# Patient Record
Sex: MALE | Race: BLACK OR AFRICAN AMERICAN | Employment: OTHER | URBAN - METROPOLITAN AREA
[De-identification: names, ages, dates, MRNs, and addresses within clinical notes are randomized per-mention and may not be internally consistent; named-entity substitution may affect disease eponyms.]

---

## 2017-06-13 PROBLEM — R42 VERTIGO: Status: ACTIVE | Noted: 2017-06-13

## 2017-06-13 PROBLEM — H61.21 IMPACTED CERUMEN OF RIGHT EAR: Status: ACTIVE | Noted: 2017-06-13

## 2017-06-13 PROBLEM — H91.90 IMPAIRED HEARING: Status: ACTIVE | Noted: 2017-06-13

## 2017-06-27 PROBLEM — R42 DIZZINESS: Status: ACTIVE | Noted: 2017-06-27

## 2017-09-13 PROBLEM — Z71.6 ENCOUNTER FOR TOBACCO USE CESSATION COUNSELING: Status: ACTIVE | Noted: 2017-09-13

## 2017-12-14 PROBLEM — F17.210 CIGARETTE NICOTINE DEPENDENCE WITHOUT COMPLICATION: Status: ACTIVE | Noted: 2017-12-14

## 2018-11-02 PROBLEM — E11.21 TYPE 2 DIABETES WITH NEPHROPATHY (HCC): Status: ACTIVE | Noted: 2018-11-02

## 2019-02-05 ENCOUNTER — HOSPITAL ENCOUNTER (OUTPATIENT)
Dept: ULTRASOUND IMAGING | Age: 74
Discharge: HOME | End: 2019-02-05
Attending: INTERNAL MEDICINE
Payer: MEDICARE

## 2019-02-05 DIAGNOSIS — I73.9 PERIPHERAL VASCULAR DISEASE, UNSPECIFIED (HCC): ICD-10-CM

## 2019-02-05 DIAGNOSIS — F17.200 SMOKER: ICD-10-CM

## 2019-02-05 PROCEDURE — 93978 VASCULAR STUDY: CPT

## 2019-02-21 PROBLEM — Z72.0 TOBACCO ABUSE: Status: ACTIVE | Noted: 2019-02-21

## 2019-02-26 ENCOUNTER — HOSPITAL ENCOUNTER (OUTPATIENT)
Dept: MRI IMAGING | Age: 74
Discharge: HOME | End: 2019-02-26
Attending: INTERNAL MEDICINE
Payer: MEDICARE

## 2019-02-26 DIAGNOSIS — M79.89 ARM SWELLING: ICD-10-CM

## 2019-02-26 PROCEDURE — 74011250636 HC RX REV CODE- 250/636: Performed by: INTERNAL MEDICINE

## 2019-02-26 PROCEDURE — A9575 INJ GADOTERATE MEGLUMI 0.1ML: HCPCS | Performed by: INTERNAL MEDICINE

## 2019-02-26 PROCEDURE — 73220 MRI UPPR EXTREMITY W/O&W/DYE: CPT

## 2019-02-26 RX ORDER — SODIUM CHLORIDE 0.9 % (FLUSH) 0.9 %
10 SYRINGE (ML) INJECTION
Status: COMPLETED | OUTPATIENT
Start: 2019-02-26 | End: 2019-02-26

## 2019-02-26 RX ORDER — GADOTERATE MEGLUMINE 376.9 MG/ML
12 INJECTION INTRAVENOUS
Status: COMPLETED | OUTPATIENT
Start: 2019-02-26 | End: 2019-02-26

## 2019-02-26 RX ADMIN — GADOTERATE MEGLUMINE 12 ML: 376.9 INJECTION INTRAVENOUS at 11:41

## 2019-02-26 RX ADMIN — Medication 10 ML: at 11:41

## 2019-05-16 ENCOUNTER — HOSPITAL ENCOUNTER (OUTPATIENT)
Dept: GENERAL RADIOLOGY | Age: 74
Discharge: HOME | End: 2019-05-16
Payer: MEDICARE

## 2019-05-16 PROBLEM — I50.9 CHRONIC CONGESTIVE HEART FAILURE (HCC): Status: ACTIVE | Noted: 2019-05-16

## 2019-05-16 PROBLEM — F17.210 CIGARETTE NICOTINE DEPENDENCE WITHOUT COMPLICATION: Status: ACTIVE | Noted: 2019-05-16

## 2019-05-16 PROCEDURE — 71046 X-RAY EXAM CHEST 2 VIEWS: CPT

## 2019-06-05 ENCOUNTER — HOSPITAL ENCOUNTER (OUTPATIENT)
Dept: GENERAL RADIOLOGY | Age: 74
Discharge: HOME | End: 2019-06-05
Attending: NURSE PRACTITIONER
Payer: MEDICARE

## 2019-06-05 PROCEDURE — 72110 X-RAY EXAM L-2 SPINE 4/>VWS: CPT

## 2019-08-19 PROBLEM — D50.8 IRON DEFICIENCY ANEMIA DUE TO DIETARY CAUSES: Status: ACTIVE | Noted: 2019-08-19

## 2019-08-26 ENCOUNTER — PATIENT OUTREACH (OUTPATIENT)
Dept: CASE MANAGEMENT | Age: 74
End: 2019-08-26

## 2020-08-04 ENCOUNTER — HOSPITAL ENCOUNTER (OUTPATIENT)
Dept: GENERAL RADIOLOGY | Age: 75
Discharge: HOME | End: 2020-08-04
Attending: NURSE PRACTITIONER
Payer: MEDICARE

## 2020-08-04 DIAGNOSIS — R06.09 DYSPNEA ON EXERTION: ICD-10-CM

## 2020-08-04 PROCEDURE — 71046 X-RAY EXAM CHEST 2 VIEWS: CPT

## 2020-08-04 NOTE — PROGRESS NOTES
Please let [facility] know his x-ray showed pneumonia. I will send an antibiotic for him to start today. He needs to follow up in the office in 1 to 2 weeks.

## 2020-09-25 ENCOUNTER — HOSPITAL ENCOUNTER (EMERGENCY)
Age: 75
Discharge: HOME | End: 2020-09-25
Attending: EMERGENCY MEDICINE
Payer: MEDICARE

## 2020-09-25 ENCOUNTER — APPOINTMENT (OUTPATIENT)
Dept: GENERAL RADIOLOGY | Age: 75
End: 2020-09-25
Attending: EMERGENCY MEDICINE
Payer: MEDICARE

## 2020-09-25 VITALS
SYSTOLIC BLOOD PRESSURE: 147 MMHG | HEIGHT: 69 IN | WEIGHT: 138 LBS | HEART RATE: 75 BPM | RESPIRATION RATE: 17 BRPM | DIASTOLIC BLOOD PRESSURE: 75 MMHG | TEMPERATURE: 98 F | BODY MASS INDEX: 20.44 KG/M2 | OXYGEN SATURATION: 100 %

## 2020-09-25 DIAGNOSIS — R06.02 SOB (SHORTNESS OF BREATH): Primary | ICD-10-CM

## 2020-09-25 LAB
ALBUMIN SERPL-MCNC: 3.1 G/DL (ref 3.2–4.6)
ALBUMIN/GLOB SERPL: 1 {RATIO} (ref 1.2–3.5)
ALP SERPL-CCNC: 131 U/L (ref 50–136)
ALT SERPL-CCNC: 36 U/L (ref 12–65)
ANION GAP SERPL CALC-SCNC: 2 MMOL/L (ref 7–16)
AST SERPL-CCNC: 32 U/L (ref 15–37)
BASOPHILS # BLD: 0 K/UL (ref 0–0.2)
BASOPHILS NFR BLD: 1 % (ref 0–2)
BILIRUB SERPL-MCNC: 0.6 MG/DL (ref 0.2–1.1)
BNP SERPL-MCNC: ABNORMAL PG/ML
BUN SERPL-MCNC: 19 MG/DL (ref 8–23)
CALCIUM SERPL-MCNC: 8.3 MG/DL (ref 8.3–10.4)
CHLORIDE SERPL-SCNC: 110 MMOL/L (ref 98–107)
CO2 SERPL-SCNC: 30 MMOL/L (ref 21–32)
CREAT SERPL-MCNC: 1.13 MG/DL (ref 0.8–1.5)
DIFFERENTIAL METHOD BLD: ABNORMAL
EOSINOPHIL # BLD: 0.1 K/UL (ref 0–0.8)
EOSINOPHIL NFR BLD: 2 % (ref 0.5–7.8)
ERYTHROCYTE [DISTWIDTH] IN BLOOD BY AUTOMATED COUNT: 18.2 % (ref 11.9–14.6)
GLOBULIN SER CALC-MCNC: 3 G/DL (ref 2.3–3.5)
GLUCOSE SERPL-MCNC: 290 MG/DL (ref 65–100)
HCT VFR BLD AUTO: 31.4 % (ref 41.1–50.3)
HGB BLD-MCNC: 10.2 G/DL (ref 13.6–17.2)
IMM GRANULOCYTES # BLD AUTO: 0 K/UL (ref 0–0.5)
IMM GRANULOCYTES NFR BLD AUTO: 0 % (ref 0–5)
LYMPHOCYTES # BLD: 0.7 K/UL (ref 0.5–4.6)
LYMPHOCYTES NFR BLD: 18 % (ref 13–44)
MCH RBC QN AUTO: 29.6 PG (ref 26.1–32.9)
MCHC RBC AUTO-ENTMCNC: 32.5 G/DL (ref 31.4–35)
MCV RBC AUTO: 91 FL (ref 79.6–97.8)
MONOCYTES # BLD: 0.3 K/UL (ref 0.1–1.3)
MONOCYTES NFR BLD: 8 % (ref 4–12)
NEUTS SEG # BLD: 2.8 K/UL (ref 1.7–8.2)
NEUTS SEG NFR BLD: 71 % (ref 43–78)
NRBC # BLD: 0 K/UL (ref 0–0.2)
PLATELET # BLD AUTO: 115 K/UL (ref 150–450)
PMV BLD AUTO: 10.2 FL (ref 9.4–12.3)
POTASSIUM SERPL-SCNC: 4.1 MMOL/L (ref 3.5–5.1)
PROT SERPL-MCNC: 6.1 G/DL (ref 6.3–8.2)
RBC # BLD AUTO: 3.45 M/UL (ref 4.23–5.6)
SODIUM SERPL-SCNC: 142 MMOL/L (ref 136–145)
TROPONIN-HIGH SENSITIVITY: 4.8 PG/ML (ref 0–14)
WBC # BLD AUTO: 3.9 K/UL (ref 4.3–11.1)

## 2020-09-25 PROCEDURE — 83880 ASSAY OF NATRIURETIC PEPTIDE: CPT

## 2020-09-25 PROCEDURE — 71046 X-RAY EXAM CHEST 2 VIEWS: CPT

## 2020-09-25 PROCEDURE — 85025 COMPLETE CBC W/AUTO DIFF WBC: CPT

## 2020-09-25 PROCEDURE — 93005 ELECTROCARDIOGRAM TRACING: CPT | Performed by: EMERGENCY MEDICINE

## 2020-09-25 PROCEDURE — 80053 COMPREHEN METABOLIC PANEL: CPT

## 2020-09-25 PROCEDURE — 99284 EMERGENCY DEPT VISIT MOD MDM: CPT

## 2020-09-25 PROCEDURE — 84484 ASSAY OF TROPONIN QUANT: CPT

## 2020-09-25 NOTE — ED TRIAGE NOTES
Patient advises that he has been having some off and on shortness of breath for a few days, states he was told to come here to rule out pneumonia. Patient denies any fever. Patient talking with complete sentences. Patient advises that he was started with some fluid pills after he started with some lower extremity swelling. Patient advises swelling has almost returned to normal. Patient with mask on during triage. Patient denies any pain.

## 2020-09-25 NOTE — ED NOTES
Patient advises worse when he was laying down in bed last night. States he had to get up to feel better.

## 2020-09-25 NOTE — ED NOTES
I have reviewed discharge instructions with the patient. The patient verbalized understanding. Patient left ED via Discharge Method: ambulatory to Home with self. Opportunity for questions and clarification provided. Patient given 0 scripts. To continue your aftercare when you leave the hospital, you may receive an automated call from our care team to check in on how you are doing. This is a free service and part of our promise to provide the best care and service to meet your aftercare needs.  If you have questions, or wish to unsubscribe from this service please call [phone]. Thank you for Choosing our [facility].

## 2020-09-25 NOTE — ED PROVIDER NOTES
Patient is a 56-year-old male with history of hypertension, heart disease who is a very poor historian. He states he was referred here by his primary care provider for further evaluation. Patient reportedly has a history of chronic CHF and some chronic kidney disease. He states that yesterday afternoon while unloading his Jacqueline Dinesh and walking he had some shortness of breath it eased off when he rested. Last night he laid down on his side to sleep and states he felt short of breath. He called his primary care physician today they did a virtual visit with the nurse practitioner and she referred him here for evaluation of heart failure or pneumonia. The history is provided by the patient. Shortness of Breath   This is a new problem. The problem occurs intermittently. The current episode started yesterday. The problem has been resolved. Pertinent negatives include no fever, no rhinorrhea, no sore throat, no neck pain, no cough, no sputum production, no hemoptysis, no wheezing, no chest pain, no abdominal pain, no rash and no leg swelling. He has tried nothing for the symptoms. Associated medical issues include COPD, CAD and heart failure.         Past Medical History:   Diagnosis Date    BPH (benign prostatic hyperplasia)     CHF (NYHA class III, ACC/AHA stage C) ([facility])     CKD (chronic kidney disease)     COPD (chronic obstructive pulmonary disease) ([facility])     Coronary atherosclerosis of native coronary artery     Diabetes mellitus with circulatory complication ([facility])     DM neuropathies ([facility])     Dyslipidemia     GERD (gastroesophageal reflux disease)     Hyperlipidemia     Ischemic cardiomyopathy     Mitral regurgitation     PVD (peripheral vascular disease) ([facility])        Past Surgical History:   Procedure Laterality Date    HX CAROTID ENDARTERECTOMY      HX CORONARY ARTERY BYPASS GRAFT  2005         Family History:   Problem Relation Age of Onset    Diabetes Mother     Heart Attack Father    January.Minus Diabetes Brother     Hypertension Brother        Social History     Socioeconomic History    Marital status: SINGLE     Spouse name: Not on file    Number of children: Not on file    Years of education: Not on file    Highest education level: Not on file   Occupational History    Not on file   Social Needs    Financial resource strain: Not on file    Food insecurity     Worry: Not on file     Inability: Not on file    Transportation needs     Medical: Not on file     Non-medical: Not on file   Tobacco Use    Smoking status: Current Every Day Smoker     Packs/day: 1.00     Years: 30.00     Pack years: 30.00    Smokeless tobacco: Never Used    Tobacco comment: almost a pack a day now, been smoking since 24years old   Substance and Sexual Activity    Alcohol use: Yes     Alcohol/week: 3.0 standard drinks     Types: 3 Cans of beer per week     Comment: a week    Drug use: No    Sexual activity: Not on file   Lifestyle    Physical activity     Days per week: Not on file     Minutes per session: Not on file    Stress: Not on file   Relationships    Social connections     Talks on phone: Not on file     Gets together: Not on file     Attends [employer] service: Not on file     Active member of club or organization: Not on file     Attends meetings of clubs or organizations: Not on file     Relationship status: Not on file    Intimate partner violence     Fear of current or ex partner: Not on file     Emotionally abused: Not on file     Physically abused: Not on file     Forced sexual activity: Not on file   Other Topics Concern    Not on file   Social History Narrative    Not on file         ALLERGIES: Omnipaque  [iohexol]; Iodine; and Unable to obtain    Review of Systems   Constitutional: Negative for chills, fatigue and fever. HENT: Negative for congestion, rhinorrhea and sore throat. Eyes: Negative for pain, discharge and visual disturbance. Respiratory: Positive for shortness of breath. Negative for cough, hemoptysis, sputum production and wheezing. Cardiovascular: Negative for chest pain, palpitations and leg swelling. Gastrointestinal: Negative for abdominal pain, diarrhea and nausea. Endocrine: Negative for polydipsia and polyuria. Genitourinary: Negative for dysuria, frequency and urgency. Musculoskeletal: Negative for back pain and neck pain. Skin: Negative for rash. Neurological: Negative for seizures, syncope and weakness. Hematological: Negative. Vitals:    09/25/20 1350 09/25/20 1440   BP: 119/70    Pulse: 80    Resp: 16    Temp: 98 °F (36.7 °C)    SpO2: 98% 100%   Weight: 62.6 kg (138 lb)    Height: 5' 9\" (1.753 m)             Physical Exam  Vitals signs and nursing note reviewed. Constitutional:       Appearance: Normal appearance. He is well-developed. HENT:      Head: Normocephalic and atraumatic. Nose: Nose normal.   Eyes:      Extraocular Movements: Extraocular movements intact. Conjunctiva/sclera: Conjunctivae normal.      Pupils: Pupils are equal, round, and reactive to light. Neck:      Musculoskeletal: Normal range of motion and neck supple. Cardiovascular:      Rate and Rhythm: Normal rate and regular rhythm. Heart sounds: Normal heart sounds. Pulmonary:      Effort: Pulmonary effort is normal.      Breath sounds: Normal breath sounds. No decreased breath sounds, wheezing or rales. Chest:      Chest wall: No tenderness or crepitus. Abdominal:      Palpations: Abdomen is soft. Tenderness: There is no abdominal tenderness. There is no guarding or rebound. Musculoskeletal: Normal range of motion. General: No tenderness. Right lower leg: No edema. Left lower leg: No edema. Lymphadenopathy:      Cervical: No cervical adenopathy. Skin:     General: Skin is warm and dry. Capillary Refill: Capillary refill takes less than 2 seconds. Findings: No rash.    Neurological:      General: No focal deficit present. Mental Status: He is alert and oriented to person, place, and time. GCS: GCS eye subscore is 4. GCS verbal subscore is 5. GCS motor subscore is 6. Cranial Nerves: No cranial nerve deficit. Sensory: No sensory deficit. Motor: Motor function is intact. MDM  Number of Diagnoses or Management Options  Diagnosis management comments: I wore appropriate PPE throughout this patient's ED visit. Valeria Pires MD, 3:08 PM    EKG reviewed by me shows normal sinus rhythm with a rate of 76 there is some nonspecific T wave changes but no acute ischemia    CBC unremarkable  Chemistries show some chronic kidney disease but no change from his baseline  proBNP elevated at 22,000    Patient is not a very good historian but reviewing his med list he is on Lasix. I could not locate any prior cardiac records but his chest x-ray does show prior sternotomy and valve replacement. But no pulmonary edema. On exam his lungs are clear and he is satting 100% on room air he also has no peripheral edema. I note the BNP is elevated but clinically I doubt any acute congestive heart failure. We will check a troponin if that is negative I think he can be discharged to home and follow-up with his doctor. 3:38 PM  CV troponin negative. Patient remained stable 100% sat on room air. Voice dictation software was used during the making of this note. This software is not perfect and grammatical and other typographical errors may be present. This note has been proofread, but may still contain errors.   Valeria Pires MD; 9/25/2020 @3:38 PM   ===================================================================         Amount and/or Complexity of Data Reviewed  Clinical lab tests: ordered and reviewed  Tests in the radiology section of CPT®: ordered and reviewed  Tests in the medicine section of CPT®: ordered and reviewed  Review and summarize past medical records: yes  Independent visualization of images, tracings, or specimens: yes    Risk of Complications, Morbidity, and/or Mortality  Presenting problems: moderate  Diagnostic procedures: low  Management options: low    Patient Progress  Patient progress: stable         Procedures

## 2020-09-25 NOTE — DISCHARGE INSTRUCTIONS
Your work-up today was negative. Continue with all of your current medications and care. Follow-up with your doctor or return to the ER for any other acute concerns.

## 2020-09-26 LAB
ATRIAL RATE: 76 BPM
CALCULATED P AXIS, ECG09: 25 DEGREES
CALCULATED R AXIS, ECG10: -42 DEGREES
CALCULATED T AXIS, ECG11: 29 DEGREES
DIAGNOSIS, 93000: NORMAL
P-R INTERVAL, ECG05: 162 MS
Q-T INTERVAL, ECG07: 406 MS
QRS DURATION, ECG06: 104 MS
QTC CALCULATION (BEZET), ECG08: 456 MS
VENTRICULAR RATE, ECG03: 76 BPM

## 2020-10-01 ENCOUNTER — PATIENT OUTREACH (OUTPATIENT)
Dept: CASE MANAGEMENT | Age: 75
End: 2020-10-01

## 2020-10-01 NOTE — PROGRESS NOTES
DARIUS CM left VM with pt regarding referral to assess for needs. Awaiting response. Will attempt again in 24 hours.

## 2020-10-01 NOTE — PROGRESS NOTES
RNCM call to pt for ED SARAH , pocc CCM. Explained role and agreeable to outreach. Note Cardiology consult next week, 10/6, he is aware. Feeling some better today, though tired. Reports meds are mostly affordable except for his pain med for back pain. Pt states \"I have to work\",  my rent keeps going up and up, currently at $900/ month. Also agreeable to [facility] outreach. Email sent to [address] [place] [address] to f/u next week after Cardiology appmt to review meds further, discuss CHF management.

## 2020-10-02 ENCOUNTER — PATIENT OUTREACH (OUTPATIENT)
Dept: CASE MANAGEMENT | Age: 75
End: 2020-10-02

## 2020-10-02 NOTE — ACP (ADVANCE CARE PLANNING)
DARIUS CM left 2nd VM with pt regarding referral.  Awaiting response. Will attempt one last time in 5 business days.

## 2020-10-05 ENCOUNTER — PATIENT OUTREACH (OUTPATIENT)
Dept: CASE MANAGEMENT | Age: 75
End: 2020-10-05

## 2020-10-05 NOTE — PROGRESS NOTES
RNCM received call from pt. He reports he cannot go to Cardiology appmt tomorrow because he cannot afford the copay. Reviewed how important the consult is, agrees to go. Has not spoken w/ SWCM yet, reports he got a new phone because his wasn't working the end of last week. Email to [facility] to try pt again. Plan to f/u after Cardiology appmt. RNCM call to pt, message left for return call. Pt called RN back. Discussed recent Cardiology appmt, upcoming tests. Concerned about costs, expecting mailing from [facility] to apply for help. Discussed smoking cessation, encouraged to cut down, smoke less. Agreeable. Support, encouragement offered. Reviewed s/s to report to MD, call EMS, go to ED. Verbalizes understanding. Expresses appreciation for RN. Plan to f/u.

## 2020-10-06 ENCOUNTER — PATIENT OUTREACH (OUTPATIENT)
Dept: CASE MANAGEMENT | Age: 75
End: 2020-10-06

## 2020-10-07 ENCOUNTER — PATIENT OUTREACH (OUTPATIENT)
Dept: CASE MANAGEMENT | Age: 75
End: 2020-10-07

## 2020-10-07 NOTE — PROGRESS NOTES
Ambulatory Care Coordination  Social Work Assessment   Referral from which PENG CM: Abbey Mcbride   Previously referred? If so, reason and brief outcome No   Reason for current referral: Financial assistance   Income information (if needed): Low income reported   Sources of Support:    ACP set up? Needs information? No   Medication Cost assistance needed? No   Referral to CLTC/Medicaid needed? No   Referral to Medicare Extra Help/LIS program needed? No   Small home repair needed? No   MOW referral? No   Any other concerns/questions? Pt stated that he can't afford MD appts and needs assistance    [facility] Financial assistance application mailed to pt-stamped envelope provided for pt to complete form and send. DARIUS REILLY will follow up in 15 days. Goals      Patient/Family verbalizes understanding of self-management of chronic disease. Pt will attend all MD appmts as scheduled by 11/5.  Supportive resources in place to maintain patient in the community (ie. Home Health, DME equipment, refer to, medication assistant plan, etc.)      Assist pt with community resources over the next month.

## 2020-10-19 ENCOUNTER — PATIENT OUTREACH (OUTPATIENT)
Dept: CASE MANAGEMENT | Age: 75
End: 2020-10-19

## 2020-10-19 NOTE — PROGRESS NOTES
Ambulatory Care Management  Follow up Outreach Note   Outreach type: Phone call: Yes     Date/Time of Outreach: 10/19/20, 1239     Reason for follow-up:   Continued outreach   Disease specific complaints/issues: Some SOB after climbing stairs     Patient progress towards goals set from last contact:   Stable   Has patient attended any PCP or specialist follow-up appointments since last contact? What was outcome of appointment? When is next follow-up scheduled? 10/6 Cardiology, Dr Torin Machado, tests scheduled  10/21 Cardiology, Echo  10/26 Cardiology, Stress test  11/5 Cardiology, Dr Torin Machado   Review medications. Any medication changes since last outreach? Does patient have any questions or issues related to their medications? Reports med compliance, discussed lasix BID timing, use of albuterol inhaler w/ SOB. Also discussed s/s to report to MD, go to ED, call EMS. Reviewed low salt diet, elevating LEs for swelling   Home health active? If yes  any issue? Progress? NA   Referrals needed?  (SW, Diabetes education, HH, etc. ) NA   Other issues/Miscellaneous? (Transportation, access to meals, ability to perform ADLs, adequate caregiver support, etc.)   Reports will fill out hospital assistance form and put in mail today or tomorrow. Support, encouragement offered.          Next Outreach Scheduled: 1-2 weeks     Next Steps/Goals:   F/U   Ambulatory Care Manager/ LPN Care Coordinator: Jeannette Walsh RN

## 2020-10-21 ENCOUNTER — PATIENT OUTREACH (OUTPATIENT)
Dept: CASE MANAGEMENT | Age: 75
End: 2020-10-21

## 2020-10-21 NOTE — PROGRESS NOTES
RNCM received call from pt c/o SOB, states he has used albuterol inhaler but it hasn't helped. Able to speak in complete sentences, reports LE swelling. Questioning if he should go to the ED. Strongly encouraged to call EMS, not to drive himself. Pt states he thinks he can make it, again strongly encouraged to call EMS. Pt noncommittal.  Note visit to [facility] ED, currently there. Plan to f/u.

## 2020-10-26 ENCOUNTER — PATIENT OUTREACH (OUTPATIENT)
Dept: CASE MANAGEMENT | Age: 75
End: 2020-10-26

## 2020-10-26 NOTE — PROGRESS NOTES
DARIUS REILLY spoke with pt to confirm that he received financial assistance application that was mailed to him. Pt did receive it and now that he is feeling better, he plans to complete paperwork and submit it. No further needs voiced at this time. DARIUS REILLY will remain available over the next two weeks if other needs arise. Goals      Patient/Family verbalizes understanding of self-management of chronic disease. Pt will attend all MD appmts as scheduled by 11/5.  Supportive resources in place to maintain patient in the community (ie. Home Health, DME equipment, refer to, medication assistant plan, etc.)      Assist pt with community resources over the next month.

## 2020-10-26 NOTE — PROGRESS NOTES
RNCM call to pt. He was discharged from [facility] on 10/24 after 3 day stay for CHF. Reviewed meds, some changes noted. Pt has printout from discharge to refer to. Note next Cardiology f/u isn't until 11/23. Call to [facility] Cardiology and appmt made for 11/6 w/ Dr Leandro Kong. Pt appreciative. Discussed smoking cessation and requests patches, etc to help. Message to Antonella Vincent NP re ordering same. Pt concerned discharging MD from [facility] discontinued his Norco for pain. Has Pain management appmt 10/28 and plans to discuss w/ MD at that time. Plan to f/u next week.

## 2020-10-30 ENCOUNTER — PATIENT OUTREACH (OUTPATIENT)
Dept: CASE MANAGEMENT | Age: 75
End: 2020-10-30

## 2020-10-30 NOTE — PROGRESS NOTES
RNCM call to pt, message left, pt returned call. Has not picked up RX for Nicotine patches yet, will call to inquire about price. Cautioned to not smoke while wearing the patch. Understands. Reports SOB markedly better than before hospital stay. Endorses slight ankle swelling, encouraged to call MD, go to ED w/ any worsening. Encouraged daily weights, elevating LEs as able. Plan to f/u next week.

## 2020-11-09 ENCOUNTER — PATIENT OUTREACH (OUTPATIENT)
Dept: CASE MANAGEMENT | Age: 75
End: 2020-11-09

## 2020-11-09 NOTE — PROGRESS NOTES
Outreach opened to close case. No further needs at this time. DARIUS CM removed from care team.  ACM RN still engaged.

## 2020-11-18 ENCOUNTER — PATIENT OUTREACH (OUTPATIENT)
Dept: CASE MANAGEMENT | Age: 75
End: 2020-11-18

## 2020-11-18 NOTE — PROGRESS NOTES
RNCM call to pt. Pt reports \"swelling mostly gone, breathing 90% better\". Discussed missed hospital f/u appmt, has another appmt scheduled for [facility] 11/23 w/ Dr Terrence Laboy. Strongly encouraged to attend, needs med review w/ MD and prob labs drawn. Pt states he received a letter stating his visit may not be covered by Medicare, but not at home to read to this RN. Pt will call back in am to discuss further with RN.  11/19 - call from pt to report he cannot find the letter he referenced yesterday. Again, strongly encouraged to attend Cardiology f/u on Mon 11/23. Take all meds and d/c paperwork from [facility] last month. Pt confirms he will go. Plan to f/u.

## 2020-11-30 ENCOUNTER — PATIENT OUTREACH (OUTPATIENT)
Dept: CASE MANAGEMENT | Age: 75
End: 2020-11-30

## 2020-11-30 NOTE — PROGRESS NOTES
Ambulatory Care Management  Follow up Outreach Note   Outreach type: Phone call: Yes     Date/Time of Outreach: 11/30/20, 1515     Reason for follow-up:   Continued outreach   Disease specific complaints/issues: \"Good days and bad days\"     Patient progress towards goals set from last contact:   Stable   Has patient attended any PCP or specialist follow-up appointments since last contact? What was outcome of appointment? When is next follow-up scheduled? 12/ 10 Labs, PCP  12/17 PCP, Jair Anderson NP   Review medications. Any medication changes since last outreach? Does patient have any questions or issues related to their medications? Reviewed all meds w/ pt, he will call MD today for refills since he has no showed last couple of Cardiology appmts. Strongly encouraged to call and reschedule today, number provided   Home health active? If yes  any issue? Progress? NA   Referrals needed?  (SW, Diabetes education, HH, etc. ) SW updated on need for another Financial assistance lilliam   Other issues/Miscellaneous?  (Transportation, access to meals, ability to perform ADLs, adequate caregiver support, etc.)   Reviewed need for follow up         Next Outreach Scheduled: 1-2 weeks     Next Steps/Goals:   F/U   Ambulatory Care Manager/ LPN Care Coordinator: Hilton Posey RN

## 2020-11-30 NOTE — PROGRESS NOTES
[facility] Financial assistance application sent again via standard mail, per request of KARLO KHOURY. Pt stated that he misplaced the first application sent.

## 2021-07-06 PROBLEM — T18.108A ESOPHAGEAL FOREIGN BODY, INITIAL ENCOUNTER: Status: ACTIVE | Noted: 2018-01-19

## 2021-08-11 PROBLEM — N40.1 BENIGN PROSTATIC HYPERPLASIA WITH NOCTURIA: Status: ACTIVE | Noted: 2021-08-01

## 2021-08-11 PROBLEM — D64.9 ANEMIA: Status: ACTIVE | Noted: 2021-08-01

## 2021-08-11 PROBLEM — Z16.12 UTI DUE TO EXTENDED-SPECTRUM BETA LACTAMASE (ESBL) PRODUCING ESCHERICHIA COLI: Status: ACTIVE | Noted: 2021-08-04

## 2021-08-11 PROBLEM — R35.1 BENIGN PROSTATIC HYPERPLASIA WITH NOCTURIA: Status: ACTIVE | Noted: 2021-08-01

## 2021-08-11 PROBLEM — N17.9 AKI (ACUTE KIDNEY INJURY) (HCC): Status: ACTIVE | Noted: 2021-08-04

## 2021-08-11 PROBLEM — T50.B95A ADVERSE EFFECT OF COVID-19 VACCINE: Status: ACTIVE | Noted: 2021-08-01

## 2021-08-11 PROBLEM — N39.0 UTI DUE TO EXTENDED-SPECTRUM BETA LACTAMASE (ESBL) PRODUCING ESCHERICHIA COLI: Status: ACTIVE | Noted: 2021-08-04

## 2021-08-11 PROBLEM — B96.29 UTI DUE TO EXTENDED-SPECTRUM BETA LACTAMASE (ESBL) PRODUCING ESCHERICHIA COLI: Status: ACTIVE | Noted: 2021-08-04

## 2021-08-11 PROBLEM — I50.20 HFREF (HEART FAILURE WITH REDUCED EJECTION FRACTION) (HCC): Status: ACTIVE | Noted: 2021-08-04

## 2022-03-10 ENCOUNTER — HOSPITAL ENCOUNTER (OUTPATIENT)
Age: 77
Setting detail: OBSERVATION
Discharge: HOME HEALTH | End: 2022-03-12
Attending: EMERGENCY MEDICINE | Admitting: STUDENT IN AN ORGANIZED HEALTH CARE EDUCATION/TRAINING PROGRAM
Payer: MEDICARE

## 2022-03-10 ENCOUNTER — APPOINTMENT (OUTPATIENT)
Dept: CT IMAGING | Age: 77
End: 2022-03-10
Attending: STUDENT IN AN ORGANIZED HEALTH CARE EDUCATION/TRAINING PROGRAM
Payer: MEDICARE

## 2022-03-10 DIAGNOSIS — I10 HYPERTENSION, UNSPECIFIED TYPE: ICD-10-CM

## 2022-03-10 DIAGNOSIS — Z98.890 HISTORY OF MITRAL VALVE REPAIR: ICD-10-CM

## 2022-03-10 DIAGNOSIS — I50.22 CHRONIC HFREF (HEART FAILURE WITH REDUCED EJECTION FRACTION) (HCC): ICD-10-CM

## 2022-03-10 DIAGNOSIS — K92.2 UPPER GI BLEED: ICD-10-CM

## 2022-03-10 DIAGNOSIS — D64.9 ACUTE ON CHRONIC ANEMIA: ICD-10-CM

## 2022-03-10 DIAGNOSIS — I73.9 PAD (PERIPHERAL ARTERY DISEASE) (HCC): ICD-10-CM

## 2022-03-10 DIAGNOSIS — D64.9 SYMPTOMATIC ANEMIA: Primary | ICD-10-CM

## 2022-03-10 DIAGNOSIS — E78.5 HYPERLIPIDEMIA, UNSPECIFIED HYPERLIPIDEMIA TYPE: ICD-10-CM

## 2022-03-10 DIAGNOSIS — R63.4 UNINTENTIONAL WEIGHT LOSS: ICD-10-CM

## 2022-03-10 DIAGNOSIS — I25.5 ISCHEMIC CARDIOMYOPATHY: ICD-10-CM

## 2022-03-10 DIAGNOSIS — Z95.1 HX OF CABG: ICD-10-CM

## 2022-03-10 DIAGNOSIS — Z98.890 HISTORY OF CAROTID ENDARTERECTOMY: ICD-10-CM

## 2022-03-10 LAB
ALBUMIN SERPL-MCNC: 3.1 G/DL (ref 3.2–4.6)
ALBUMIN/GLOB SERPL: 0.8 {RATIO} (ref 1.2–3.5)
ALP SERPL-CCNC: 148 U/L (ref 50–136)
ALT SERPL-CCNC: 35 U/L (ref 12–65)
ANION GAP SERPL CALC-SCNC: 0 MMOL/L (ref 7–16)
AST SERPL-CCNC: 39 U/L (ref 15–37)
BASOPHILS # BLD: 0 K/UL (ref 0–0.2)
BASOPHILS # BLD: 0 K/UL (ref 0–0.2)
BASOPHILS NFR BLD: 0 % (ref 0–2)
BASOPHILS NFR BLD: 1 % (ref 0–2)
BILIRUB SERPL-MCNC: 0.4 MG/DL (ref 0.2–1.1)
BUN SERPL-MCNC: 31 MG/DL (ref 8–23)
CALCIUM SERPL-MCNC: 8.6 MG/DL (ref 8.3–10.4)
CHLORIDE SERPL-SCNC: 109 MMOL/L (ref 98–107)
CO2 SERPL-SCNC: 30 MMOL/L (ref 21–32)
CREAT SERPL-MCNC: 1.24 MG/DL (ref 0.8–1.5)
DIFFERENTIAL METHOD BLD: ABNORMAL
DIFFERENTIAL METHOD BLD: ABNORMAL
EOSINOPHIL # BLD: 0.1 K/UL (ref 0–0.8)
EOSINOPHIL # BLD: 0.2 K/UL (ref 0–0.8)
EOSINOPHIL NFR BLD: 3 % (ref 0.5–7.8)
EOSINOPHIL NFR BLD: 5 % (ref 0.5–7.8)
ERYTHROCYTE [DISTWIDTH] IN BLOOD BY AUTOMATED COUNT: 19.9 % (ref 11.9–14.6)
ERYTHROCYTE [DISTWIDTH] IN BLOOD BY AUTOMATED COUNT: 19.9 % (ref 11.9–14.6)
GLOBULIN SER CALC-MCNC: 4.1 G/DL (ref 2.3–3.5)
GLUCOSE BLD STRIP.AUTO-MCNC: 118 MG/DL (ref 65–100)
GLUCOSE BLD STRIP.AUTO-MCNC: 193 MG/DL (ref 65–100)
GLUCOSE SERPL-MCNC: 146 MG/DL (ref 65–100)
HCT VFR BLD AUTO: 25.2 % (ref 41.1–50.3)
HCT VFR BLD AUTO: 27.8 % (ref 41.1–50.3)
HEMOCCULT STL QL: POSITIVE
HGB BLD-MCNC: 7 G/DL (ref 13.6–17.2)
HGB BLD-MCNC: 7.9 G/DL (ref 13.6–17.2)
IMM GRANULOCYTES # BLD AUTO: 0 K/UL (ref 0–0.5)
IMM GRANULOCYTES # BLD AUTO: 0 K/UL (ref 0–0.5)
IMM GRANULOCYTES NFR BLD AUTO: 0 % (ref 0–5)
IMM GRANULOCYTES NFR BLD AUTO: 1 % (ref 0–5)
IRON SATN MFR SERPL: 3 %
IRON SERPL-MCNC: 10 UG/DL (ref 35–150)
LYMPHOCYTES # BLD: 0.8 K/UL (ref 0.5–4.6)
LYMPHOCYTES # BLD: 0.9 K/UL (ref 0.5–4.6)
LYMPHOCYTES NFR BLD: 20 % (ref 13–44)
LYMPHOCYTES NFR BLD: 22 % (ref 13–44)
MCH RBC QN AUTO: 22.9 PG (ref 26.1–32.9)
MCH RBC QN AUTO: 23.2 PG (ref 26.1–32.9)
MCHC RBC AUTO-ENTMCNC: 27.8 G/DL (ref 31.4–35)
MCHC RBC AUTO-ENTMCNC: 28.4 G/DL (ref 31.4–35)
MCV RBC AUTO: 81.5 FL (ref 79.6–97.8)
MCV RBC AUTO: 82.4 FL (ref 79.6–97.8)
MONOCYTES # BLD: 0.4 K/UL (ref 0.1–1.3)
MONOCYTES # BLD: 0.5 K/UL (ref 0.1–1.3)
MONOCYTES NFR BLD: 10 % (ref 4–12)
MONOCYTES NFR BLD: 12 % (ref 4–12)
NEUTS SEG # BLD: 2.6 K/UL (ref 1.7–8.2)
NEUTS SEG # BLD: 2.7 K/UL (ref 1.7–8.2)
NEUTS SEG NFR BLD: 63 % (ref 43–78)
NEUTS SEG NFR BLD: 65 % (ref 43–78)
NRBC # BLD: 0 K/UL (ref 0–0.2)
NRBC # BLD: 0 K/UL (ref 0–0.2)
PLATELET # BLD AUTO: 104 K/UL (ref 150–450)
PLATELET # BLD AUTO: 130 K/UL (ref 150–450)
PMV BLD AUTO: 10.2 FL (ref 9.4–12.3)
PMV BLD AUTO: 9.2 FL (ref 9.4–12.3)
POTASSIUM SERPL-SCNC: 3.6 MMOL/L (ref 3.5–5.1)
PROT SERPL-MCNC: 7.2 G/DL (ref 6.3–8.2)
RBC # BLD AUTO: 3.06 M/UL (ref 4.23–5.6)
RBC # BLD AUTO: 3.41 M/UL (ref 4.23–5.6)
SERVICE CMNT-IMP: ABNORMAL
SERVICE CMNT-IMP: ABNORMAL
SODIUM SERPL-SCNC: 139 MMOL/L (ref 136–145)
TIBC SERPL-MCNC: 326 UG/DL (ref 250–450)
WBC # BLD AUTO: 4 K/UL (ref 4.3–11.1)
WBC # BLD AUTO: 4.3 K/UL (ref 4.3–11.1)

## 2022-03-10 PROCEDURE — 96376 TX/PRO/DX INJ SAME DRUG ADON: CPT

## 2022-03-10 PROCEDURE — 85025 COMPLETE CBC W/AUTO DIFF WBC: CPT

## 2022-03-10 PROCEDURE — 74011250636 HC RX REV CODE- 250/636: Performed by: STUDENT IN AN ORGANIZED HEALTH CARE EDUCATION/TRAINING PROGRAM

## 2022-03-10 PROCEDURE — G0378 HOSPITAL OBSERVATION PER HR: HCPCS

## 2022-03-10 PROCEDURE — 82962 GLUCOSE BLOOD TEST: CPT

## 2022-03-10 PROCEDURE — 74011250637 HC RX REV CODE- 250/637: Performed by: STUDENT IN AN ORGANIZED HEALTH CARE EDUCATION/TRAINING PROGRAM

## 2022-03-10 PROCEDURE — 74011000250 HC RX REV CODE- 250: Performed by: EMERGENCY MEDICINE

## 2022-03-10 PROCEDURE — 99285 EMERGENCY DEPT VISIT HI MDM: CPT

## 2022-03-10 PROCEDURE — C9113 INJ PANTOPRAZOLE SODIUM, VIA: HCPCS | Performed by: EMERGENCY MEDICINE

## 2022-03-10 PROCEDURE — 86923 COMPATIBILITY TEST ELECTRIC: CPT

## 2022-03-10 PROCEDURE — C9113 INJ PANTOPRAZOLE SODIUM, VIA: HCPCS | Performed by: STUDENT IN AN ORGANIZED HEALTH CARE EDUCATION/TRAINING PROGRAM

## 2022-03-10 PROCEDURE — 74011000250 HC RX REV CODE- 250: Performed by: STUDENT IN AN ORGANIZED HEALTH CARE EDUCATION/TRAINING PROGRAM

## 2022-03-10 PROCEDURE — 94760 N-INVAS EAR/PLS OXIMETRY 1: CPT

## 2022-03-10 PROCEDURE — 86900 BLOOD TYPING SEROLOGIC ABO: CPT

## 2022-03-10 PROCEDURE — 82270 OCCULT BLOOD FECES: CPT

## 2022-03-10 PROCEDURE — 96374 THER/PROPH/DIAG INJ IV PUSH: CPT

## 2022-03-10 PROCEDURE — 74011250636 HC RX REV CODE- 250/636: Performed by: EMERGENCY MEDICINE

## 2022-03-10 PROCEDURE — 80053 COMPREHEN METABOLIC PANEL: CPT

## 2022-03-10 PROCEDURE — 83540 ASSAY OF IRON: CPT

## 2022-03-10 PROCEDURE — 36415 COLL VENOUS BLD VENIPUNCTURE: CPT

## 2022-03-10 PROCEDURE — 70490 CT SOFT TISSUE NECK W/O DYE: CPT

## 2022-03-10 RX ORDER — IBUPROFEN 200 MG
1 TABLET ORAL EVERY 24 HOURS
Status: DISCONTINUED | OUTPATIENT
Start: 2022-03-10 | End: 2022-03-12 | Stop reason: HOSPADM

## 2022-03-10 RX ORDER — INSULIN LISPRO 100 [IU]/ML
INJECTION, SOLUTION INTRAVENOUS; SUBCUTANEOUS
Status: DISCONTINUED | OUTPATIENT
Start: 2022-03-10 | End: 2022-03-12 | Stop reason: HOSPADM

## 2022-03-10 RX ORDER — ONDANSETRON 4 MG/1
4 TABLET, ORALLY DISINTEGRATING ORAL
Status: DISCONTINUED | OUTPATIENT
Start: 2022-03-10 | End: 2022-03-12 | Stop reason: HOSPADM

## 2022-03-10 RX ORDER — IPRATROPIUM BROMIDE AND ALBUTEROL SULFATE 2.5; .5 MG/3ML; MG/3ML
3 SOLUTION RESPIRATORY (INHALATION)
Status: ACTIVE | OUTPATIENT
Start: 2022-03-10 | End: 2022-03-11

## 2022-03-10 RX ORDER — CLOPIDOGREL BISULFATE 75 MG/1
75 TABLET ORAL DAILY
Status: DISCONTINUED | OUTPATIENT
Start: 2022-03-11 | End: 2022-03-11

## 2022-03-10 RX ORDER — LANOLIN ALCOHOL/MO/W.PET/CERES
1 CREAM (GRAM) TOPICAL
Status: DISCONTINUED | OUTPATIENT
Start: 2022-03-11 | End: 2022-03-12 | Stop reason: HOSPADM

## 2022-03-10 RX ORDER — ASPIRIN 325 MG
325 TABLET ORAL DAILY
Status: DISCONTINUED | OUTPATIENT
Start: 2022-03-11 | End: 2022-03-11

## 2022-03-10 RX ORDER — CHOLECALCIFEROL (VITAMIN D3) 125 MCG
10 CAPSULE ORAL
Status: DISCONTINUED | OUTPATIENT
Start: 2022-03-10 | End: 2022-03-12 | Stop reason: HOSPADM

## 2022-03-10 RX ORDER — ACETAMINOPHEN 325 MG/1
650 TABLET ORAL
Status: DISCONTINUED | OUTPATIENT
Start: 2022-03-10 | End: 2022-03-10

## 2022-03-10 RX ORDER — TORSEMIDE 20 MG/1
20 TABLET ORAL DAILY
Status: DISCONTINUED | OUTPATIENT
Start: 2022-03-11 | End: 2022-03-12 | Stop reason: HOSPADM

## 2022-03-10 RX ORDER — FLUTICASONE PROPIONATE 50 MCG
2 SPRAY, SUSPENSION (ML) NASAL DAILY
Status: DISCONTINUED | OUTPATIENT
Start: 2022-03-11 | End: 2022-03-10

## 2022-03-10 RX ORDER — BUDESONIDE AND FORMOTEROL FUMARATE DIHYDRATE 80; 4.5 UG/1; UG/1
2 AEROSOL RESPIRATORY (INHALATION) 2 TIMES DAILY
Status: DISCONTINUED | OUTPATIENT
Start: 2022-03-10 | End: 2022-03-10

## 2022-03-10 RX ORDER — POLYETHYLENE GLYCOL 3350 17 G/17G
17 POWDER, FOR SOLUTION ORAL DAILY PRN
Status: DISCONTINUED | OUTPATIENT
Start: 2022-03-10 | End: 2022-03-12 | Stop reason: HOSPADM

## 2022-03-10 RX ORDER — ACETAMINOPHEN 650 MG/1
650 SUPPOSITORY RECTAL
Status: DISCONTINUED | OUTPATIENT
Start: 2022-03-10 | End: 2022-03-10

## 2022-03-10 RX ORDER — TAMSULOSIN HYDROCHLORIDE 0.4 MG/1
0.4 CAPSULE ORAL DAILY
Status: DISCONTINUED | OUTPATIENT
Start: 2022-03-11 | End: 2022-03-12 | Stop reason: HOSPADM

## 2022-03-10 RX ORDER — DICLOFENAC SODIUM 10 MG/G
2 GEL TOPICAL 4 TIMES DAILY
Status: DISCONTINUED | OUTPATIENT
Start: 2022-03-10 | End: 2022-03-10

## 2022-03-10 RX ORDER — CARVEDILOL 6.25 MG/1
12.5 TABLET ORAL 2 TIMES DAILY
Status: DISCONTINUED | OUTPATIENT
Start: 2022-03-10 | End: 2022-03-12 | Stop reason: HOSPADM

## 2022-03-10 RX ORDER — SODIUM CHLORIDE 0.9 % (FLUSH) 0.9 %
5-40 SYRINGE (ML) INJECTION AS NEEDED
Status: DISCONTINUED | OUTPATIENT
Start: 2022-03-10 | End: 2022-03-12 | Stop reason: HOSPADM

## 2022-03-10 RX ORDER — LISINOPRIL 20 MG/1
40 TABLET ORAL DAILY
Status: DISCONTINUED | OUTPATIENT
Start: 2022-03-11 | End: 2022-03-12 | Stop reason: HOSPADM

## 2022-03-10 RX ORDER — ATORVASTATIN CALCIUM 40 MG/1
40 TABLET, FILM COATED ORAL DAILY
Status: DISCONTINUED | OUTPATIENT
Start: 2022-03-11 | End: 2022-03-12 | Stop reason: HOSPADM

## 2022-03-10 RX ORDER — PANTOPRAZOLE SODIUM 40 MG/1
40 TABLET, DELAYED RELEASE ORAL DAILY
Status: DISCONTINUED | OUTPATIENT
Start: 2022-03-11 | End: 2022-03-11

## 2022-03-10 RX ORDER — SODIUM CHLORIDE 0.9 % (FLUSH) 0.9 %
5-40 SYRINGE (ML) INJECTION EVERY 8 HOURS
Status: DISCONTINUED | OUTPATIENT
Start: 2022-03-10 | End: 2022-03-12 | Stop reason: HOSPADM

## 2022-03-10 RX ORDER — ENOXAPARIN SODIUM 100 MG/ML
40 INJECTION SUBCUTANEOUS DAILY
Status: DISCONTINUED | OUTPATIENT
Start: 2022-03-11 | End: 2022-03-10

## 2022-03-10 RX ORDER — ONDANSETRON 2 MG/ML
4 INJECTION INTRAMUSCULAR; INTRAVENOUS
Status: DISCONTINUED | OUTPATIENT
Start: 2022-03-10 | End: 2022-03-12 | Stop reason: HOSPADM

## 2022-03-10 RX ORDER — GLIPIZIDE 5 MG/1
5 TABLET ORAL DAILY
Status: DISCONTINUED | OUTPATIENT
Start: 2022-03-11 | End: 2022-03-12 | Stop reason: HOSPADM

## 2022-03-10 RX ORDER — GABAPENTIN 100 MG/1
100 CAPSULE ORAL 2 TIMES DAILY
Status: DISCONTINUED | OUTPATIENT
Start: 2022-03-10 | End: 2022-03-12 | Stop reason: HOSPADM

## 2022-03-10 RX ORDER — ALLOPURINOL 300 MG/1
300 TABLET ORAL DAILY
Status: DISCONTINUED | OUTPATIENT
Start: 2022-03-11 | End: 2022-03-12 | Stop reason: HOSPADM

## 2022-03-10 RX ORDER — BUDESONIDE AND FORMOTEROL FUMARATE DIHYDRATE 80; 4.5 UG/1; UG/1
2 AEROSOL RESPIRATORY (INHALATION)
Status: DISCONTINUED | OUTPATIENT
Start: 2022-03-10 | End: 2022-03-12 | Stop reason: HOSPADM

## 2022-03-10 RX ADMIN — SODIUM CHLORIDE, PRESERVATIVE FREE 10 ML: 5 INJECTION INTRAVENOUS at 21:46

## 2022-03-10 RX ADMIN — GABAPENTIN 100 MG: 100 CAPSULE ORAL at 17:19

## 2022-03-10 RX ADMIN — Medication 10 MG: at 21:44

## 2022-03-10 RX ADMIN — CARVEDILOL 12.5 MG: 6.25 TABLET, FILM COATED ORAL at 17:19

## 2022-03-10 RX ADMIN — SODIUM CHLORIDE, PRESERVATIVE FREE 40 MG: 5 INJECTION INTRAVENOUS at 21:46

## 2022-03-10 RX ADMIN — SODIUM CHLORIDE 40 MG: 9 INJECTION, SOLUTION INTRAMUSCULAR; INTRAVENOUS; SUBCUTANEOUS at 15:11

## 2022-03-10 RX ADMIN — SODIUM CHLORIDE 1000 ML: 900 INJECTION, SOLUTION INTRAVENOUS at 15:11

## 2022-03-10 NOTE — ED NOTES
68 yo male here today for low iron and hemoglobin. Had blood work done on 3/4 and hemoglobin 7.6. Patient was called yesterday and this morning and told to come to ER. No pain. Reports green stool, denies blood in stool. Reports some generalized fatigue, but denies dizziness. VSS. NAD. Patient evaluated initially in triage. Rapid Medical Evaluation was conducted and necessary orders have been placed. I have performed a medical screening exam.  Care will now be transferred to the provider in the back of the emergency department.   CONCEPCIÓN Khan 1:14 PM

## 2022-03-10 NOTE — PROGRESS NOTES
TRANSFER - IN REPORT:    Verbal report received from Sharla Miller, [facility] on Cami Gutierrez  being received from ED for routine progression of care      Report consisted of patients Situation, Background, Assessment and   Recommendations(SBAR). Information from the following report(s) SBAR, Kardex, ED Summary, Intake/Output, MAR, Recent Results and Med Rec Status was reviewed with the receiving nurse. Opportunity for questions and clarification was provided. Assessment completed upon patients arrival to unit and care assumed.

## 2022-03-10 NOTE — H&P (VIEW-ONLY)
[facility] Consult Note       Referring Physician:  Karina Armstrong    Consult Date:  3/10/2022    Admit Date:  3/10/2022    Chief Complaint:  anemia    Subjective:     History of Present Illness:  Patient is a 48-year-old male patient of Dr. Odalis Clarke with past medical history of CAD status post CABG on aspirin and Plavix, diabetes, diabetic neuropathy, peripheral vascular disease, carotid endarterectomy, active smoking who was seen at the request of Dr. Alexandra Valentin for anemia. He was referred to the emergency room by his primary care physician for low hemoglobin. He has had weight loss, appetite loss recently. He takes . He had 2 large bouts epistaxis 2 months ago, none since. He denies melena, BRBPR, abd pain, fevers, NSAIDs, extra ASA. He has never had colonoscopy and reports EGD remotely with possible H pylori infection. His hemoglobin seems to run in the range of 10, but since the end of , it has decreased to the high 70s now. Platelets 112. MCV normal. RDW elevated at 19.9, was normal in the past. Tren and elevated at 1.24, BUN 31. ALT normal. AST 39, alkaline phosphatase 148 with normal bilirubin. Albumin 3.1. FOBT + in ER. Due to weight loss and concern for malignancy, CT of the neck, chest, abdomen and pelvis was performed. Small right pleural effusion and patchy infiltrate in the right upper lobe seen. Cardiomegaly present. Liver normal in size and appearance with no gallstones or bile duct dilatation. Pancreas with calcification suggesting of chronic pancreatitis.          PMH:  Past Medical History:   Diagnosis Date    BPH (benign prostatic hyperplasia)     CHF (NYHA class III, ACC/AHA stage C) (HCC)     Chronic kidney disease     CKD (chronic kidney disease)     COPD (chronic obstructive pulmonary disease) (HCC)     Coronary atherosclerosis of native coronary artery     Diabetes mellitus with circulatory complication (HCC)     DM neuropathies ([address]     Dyslipidemia     GERD (gastroesophageal reflux disease)     Hyperlipidemia     Hypertension     Ischemic cardiomyopathy     Mitral regurgitation     [facility]VD [facility]per[facility] vascular [facility]) ([facility])        PSH:  Past Surgical History:   Procedure Laterality Date    HX CAROTID ENDARTERECTOMY      HX CORONARY ARTERY BYPASS GRAFT  2005       Allergies: Allergies   Allergen Reactions    Omnipaque  [Iohexol] Hives    Iodine Rash    Unable To Obtain Unknown (comments)       Home Medications:  Prior to Admission medications    Medication Sig Start Date End Date Taking? Authorizing Provider   allopurinoL (ZYLOPRIM) 300 mg tablet Take 1 Tablet by mouth daily. 3/2/22  Yes Afsaneh Domingo NP   albuterol (Ventolin HFA) 90 mcg/actuation inhaler Take 2 Puffs by inhalation every four (4) hours as needed for Wheezing. 3/2/22  Yes Talia Monsivais NP   food supplemt, lactose-reduced (Ensure High Protein) liqd Take 237 mL by mouth four (4) times daily. 3/2/22  Yes Afsnaeh Domingo NP   clopidogreL (PLAVIX) 75 mg tab Take 1 Tablet by mouth daily. 3/2/22  Yes Afsaneh Domingo NP   glipiZIDE (GLUCOTROL) 5 mg tablet Take 1 Tablet by mouth daily. 3/2/22  Yes Afsaneh Domingo NP   budesonide-formoteroL (SYMBICORT) 80-4.5 mcg/actuation HFAA Take 2 Puffs by inhalation two (2) times a day. 3/2/22  Yes Afsaneh Domingo NP   lisinopriL (PRINIVIL, ZESTRIL) 40 mg tablet Take 1 Tablet by mouth daily. 12/28/21  Yes Ana Montanez,    gabapentin (NEURONTIN) 400 mg capsule Take 400 mg by mouth. 6/27/21  Yes Provider, Historical   tamsulosin (FLOMAX) 0.4 mg capsule Take 0.4 mg by mouth daily. 8/21/21  Yes Provider, Historical   pantoprazole (PROTONIX) 40 mg tablet Take 40 mg by mouth. 5/17/21  Yes Provider, Historical   diclofenac (VOLTAREN) 1 % gel Apply  to affected area four (4) times daily. 3/31/21  Yes Afsaneh Domingo NP   carvediloL (COREG) 25 mg tablet Take 1 Tab by mouth two (2) times a day.  1/5/21  Yes Ana Montanez DO   atorvastatin (LIPITOR) 40 mg tablet Take 1 Tab by mouth daily. 1/5/21  Yes Nelia Hernandez, DO   torsemide (DEMADEX) 20 mg tablet Take 1 Tab by mouth daily. 1/5/21  Yes Nelia Spikes, DO   fluticasone propionate (FLONASE) 50 mcg/actuation nasal spray 2 Sprays by Both Nostrils route daily. 12/30/20  Yes Afsaneh Domingo NP   melatonin 10 mg tab Take 1 tab PO at HS for insomnia. 8/10/20  Yes Eyad Abreu NP   OneTouch Ultra Blue Test Strip strip USE TO TEST BLOOD GLUCOSE AS DIRECTED ONCE EVERY DAY 6/23/20  Yes Eyad Abreu NP   OTHER Dispense single pair of tedhose. Dx: dependent edema lower extremities 4/21/20  Yes Eyad Abreu NP   Dorena Mullet ULTRA2 METER misc USE AS DIRECTED DAILY FOR TESTING BLOOD SUGAR 10/10/19  Yes Eyad Abreu NP   Back Brace misc Use back brace as directed for 4-6 hours daily. DX: DDD 7/2/19  Yes Eyad Abreu NP   ONE [facility] OF [facility] 33 gauge misc  0/1/83  Yes Provider, Historical   lancets (ACCU-CHEK FASTCLIX LANCET DRUM) misc Once daily 2/4/19  Yes Jennifer Alaniz MD   aspirin (ASPIRIN) 325 mg tablet Take 325 mg by mouth daily. Yes Provider, Historical   pantoprazole (PROTONIX) 40 mg tablet Take 1 Tablet by mouth daily.   Patient not taking: Reported on 3/10/2022 3/2/22   Eyad Abreu NP   HYDROcodone-acetaminophen (NORCO) 7.5-325 mg per tablet TK 1 T PO TID PRN FOR 30 DAYS  Patient not taking: Reported on 3/10/2022 7/14/20   Provider, Historical       Hospital Medications:  Current Facility-Administered Medications   Medication Dose Route Frequency    sodium chloride (NS) flush 5-40 mL  5-40 mL IntraVENous Q8H    sodium chloride (NS) flush 5-40 mL  5-40 mL IntraVENous PRN    polyethylene glycol (MIRALAX) packet 17 g  17 g Oral DAILY PRN    ondansetron (ZOFRAN ODT) tablet 4 mg  4 mg Oral Q8H PRN    Or    ondansetron (ZOFRAN) injection 4 mg  4 mg IntraVENous Q6H PRN    albuterol-ipratropium (DUO-NEB) 2.5 MG-0.5 MG/3 ML  3 mL Nebulization NOW    [START ON 3/11/2022] atorvastatin (LIPITOR) tablet 40 mg  40 mg Oral DAILY    budesonide-formoterol (SYMBICORT) 80-4.5 mcg inhaler  2 Puff Inhalation BID    carvediloL (COREG) tablet 12.5 mg  12.5 mg Oral BID    [Held by provider] clopidogreL (PLAVIX) tablet 75 mg  75 mg Oral DAILY    [Held by provider] aspirin tablet 325 mg  325 mg Oral DAILY    [Held by provider] allopurinoL (ZYLOPRIM) tablet 300 mg  300 mg Oral DAILY    gabapentin (NEURONTIN) capsule 100 mg  100 mg Oral BID    [Held by provider] glipiZIDE (GLUCOTROL) tablet 5 mg  5 mg Oral DAILY    [Held by provider] lisinopriL (PRINIVIL, ZESTRIL) tablet 40 mg  40 mg Oral DAILY    [START ON 3/11/2022] pantoprazole (PROTONIX) tablet 40 mg  40 mg Oral DAILY    [START ON 3/11/2022] tamsulosin (FLOMAX) capsule 0.4 mg  0.4 mg Oral DAILY    [Held by provider] torsemide (DEMADEX) tablet 20 mg  20 mg Oral DAILY    nicotine (NICODERM CQ) 14 mg/24 hr patch 1 Patch  1 Patch TransDERmal Q24H    [START ON 3/11/2022] ferrous sulfate tablet 325 mg  1 Tablet Oral DAILY WITH BREAKFAST    insulin lispro (HUMALOG) injection   SubCUTAneous AC&HS    pantoprazole (PROTONIX) 40 mg in 0.9% sodium chloride 10 mL injection  40 mg IntraVENous Q12H    melatonin tablet 10 mg  10 mg Oral QHS       Social History:  Social History     Tobacco Use    Smoking status: Current Every Day Smoker     Packs/day: 0.60     Years: 30.00     Pack years: 18.00    Smokeless tobacco: Never Used    Tobacco comment: almost a pack a day now, been smoking since 24years old   Substance Use Topics    Alcohol use: Yes     Alcohol/week: 1.0 standard drink     Types: 1 Cans of beer per week     Comment: a week     Pt denies any history of IV drug use, blood transfusions, or tattoos.     Family History:  Family History   Problem Relation Age of Onset    Diabetes Mother     Heart Attack Father     Diabetes Brother     Hypertension Brother        Review of Systems:  A detailed 10 system ROS is obtained, with pertinent positives as listed above. All others are negative. Diet:      Objective:     Physical Exam:  Vitals:  Visit Vitals  BP (!) 124/59 (BP 1 Location: Left upper arm, BP Patient Position: At rest)   Pulse 83   Temp 98.3 °F (36.8 °C)   Resp 18   Ht 5' 9\" (1.753 m)   Wt 79.4 kg (175 lb)   SpO2 95%   BMI 25.84 kg/m²     Gen:  Pt is alert, cooperative, no acute distress  Skin:  Extremities and face reveal no rashes. No palmar erythema. No telangiectasias on the chest wall. HEENT: Sclerae anicteric. Extra-occular muscles are intact. No oral ulcers. No abnormal pigmentation of the lips. The neck is supple. Cardiovascular: Regular rate and rhythm. No murmurs, gallops, or rubs. Respiratory:  Comfortable breathing with no accessory muscle use. Clear breath sounds anteriorly with no wheezes, rales, or rhonchi. GI:  Abdomen nondistended, soft, and nontender. Normal active bowel sounds. No enlargement of the liver or spleen. No masses palpable. Rectal:  Deferred  Musculoskeletal:  No pitting edema of the lower legs. Extremities have good range of motion. SCDs  Neurological:  Gross memory appears intact. Patient is alert and oriented. Psychiatric:  Mood appears appropriate with judgement intact. Lymphatic:  No cervical or supraclavicular adenopathy. Laboratory:    Recent Labs     03/10/22  1322   WBC 4.0*   HGB 7.9*   HCT 27.8*   *   MCV 81.5      K 3.6   *   CO2 30   BUN 31*   CREA 1.24   CA 8.6   *   *   ALB 3.1*   TP 7.2          Assessment:       40-year-old male patient of Dr. Fina Pineda with past medical history of CAD status post CABG on aspirin and Plavix, diabetes, diabetic neuropathy, peripheral vascular disease, carotid endarterectomy, active smoking who was seen at the request of Dr. Mayank Stewart for anemia. He was referred to the emergency room by his primary care physician for low hemoglobin. He has weight loss and loss of appetite.      He has acute on chronic normocytic anemia with a new increase in RDW. There has been no overt bleeding. He is currently eating solid food. Plan:       1. Iron panel  2. EGD tomorrow for weight loss and loss of appetite  3. Colonoscopy outpatient if no overt bleeding  4. NPO at [place]  5.  Transfuse as needed    Donte Duran MD  [facility], [place]

## 2022-03-10 NOTE — ED TRIAGE NOTES
Patient reports PCP called and told him to come in for low \"iron\". Patient reports green BM x 3-4 times.  Masked

## 2022-03-10 NOTE — PROGRESS NOTES
Care Management Interventions  PCP Verified by CM: Yes Rony Hooper N.P.)  Transition of Care Consult (CM Consult): Discharge Planning  Support Systems: Friend/Neighbor Javed Romero #[phone])  Confirm Follow Up Transport: Family  The Patient and/or Patient Representative was Provided with a Choice of Provider and Agrees with the Discharge Plan?: Yes  Freedom of Choice List was Provided with Basic Dialogue that Supports the Patient's Individualized Plan of Care/Goals, Treatment Preferences and Shares the Quality Data Associated with the Providers?: Yes  Discharge Location  Patient Expects to be Discharged to[de-identified] Unable to determine at this time  Visited with pt to establish prior to admission baseline and discuss their anticipated goals at time of d/c.   Pt came in b/c he received a call form PCP about low Hgb levels from recent blood test and to please cone to the ED for evaluation. Pt lives at home at the [facility] apartments, inde with ADL's, drives and current with PCP, his Rx are filled at [facility] off [facility]. PT to eval and Cm to follow.     [address] letter done

## 2022-03-10 NOTE — ED NOTES
TRANSFER - OUT REPORT:    [facility] [address]  being transferred to [facility] for routine progression of care       Report consisted of patients Situation, Background, Assessment and   Recommendations(SBAR). Information from the following report(s) SBAR, ED Summary, MAR, Recent Results and Quality Measures was reviewed with the receiving nurse. Lines:   Peripheral IV 03/10/22 Left Forearm (Active)   Site Assessment Clean, dry, & intact 03/10/22 1315   Phlebitis Assessment 0 03/10/22 1315   Infiltration Assessment 0 03/10/22 1315   Dressing Status Clean, dry, & intact 03/10/22 1315   Dressing Type Gauze;Tape;Transparent 03/10/22 1315   Hub Color/Line Status Pink;Flushed 03/10/22 1315   Action Taken Blood drawn 03/10/22 1315        Opportunity for questions and clarification was provided.       Patient transported with:   [employer]

## 2022-03-10 NOTE — PROGRESS NOTES
03/10/22 1738   Skin Integumentary   Skin Integumentary (WDL) X    Pressure  Injury Documentation No Pressure Injury Noted-Pressure Ulcer Prevention Initiated   Skin Color Appropriate for ethnicity   Skin Condition/Temp Cool;Dry;Flaky   Skin Integrity Other (comment)  (white mole on bottom of right heel )   Nails X   Exceptions to WDL Thick   Pt admitted to room 336. Alert and oriented x4. Reports last bowel movement 3/9/2022, describes as hard and green. Abdomen soft, denies pain. 2+ pitting edema in the bilateral lower extremities. Nicotine patch applied to left arm. Ambulated to restroom, voided without difficulty. .  No needs expressed at this time

## 2022-03-10 NOTE — CONSULTS
[facility] Consult Note       Referring Physician:  Jasmyn Carter    Consult Date:  3/10/2022    Admit Date:  3/10/2022    Chief Complaint:  anemia    Subjective:     History of Present Illness:  Patient is a 69-year-old male patient of Dr. Fina Pineda with past medical history of CAD status post CABG on aspirin and Plavix, diabetes, diabetic neuropathy, peripheral vascular disease, carotid endarterectomy, active smoking who was seen at the request of Dr. Mayank Stewart for anemia. He was referred to the emergency room by his primary care physician for low hemoglobin. He has had weight loss, appetite loss recently. He takes . He had 2 large bouts epistaxis 2 months ago, none since. He denies melena, BRBPR, abd pain, fevers, NSAIDs, extra ASA. He has never had colonoscopy and reports EGD remotely with possible H pylori infection. His hemoglobin seems to run in the range of 10, but since the end of 2020, it has decreased to the high 70s now. Platelets 584. MCV normal. RDW elevated at 19.9, was normal in the past. Tren and elevated at 1.24, BUN 31. ALT normal. AST 39, alkaline phosphatase 148 with normal bilirubin. Albumin 3.1. FOBT + in ER. Due to weight loss and concern for malignancy, CT of the neck, chest, abdomen and pelvis was performed. Small right pleural effusion and patchy infiltrate in the right upper lobe seen. Cardiomegaly present. Liver normal in size and appearance with no gallstones or bile duct dilatation. Pancreas with calcification suggesting of chronic pancreatitis.          PMH:  Past Medical History:   Diagnosis Date    BPH (benign prostatic hyperplasia)     CHF (NYHA class III, ACC/AHA stage C) (HCC)     Chronic kidney disease     CKD (chronic kidney disease)     COPD (chronic obstructive pulmonary disease) (HCC)     Coronary atherosclerosis of native coronary artery     Diabetes mellitus with circulatory complication (HCC)     DM neuropathies ([address]     Dyslipidemia     GERD (gastroesophageal reflux disease)     Hyperlipidemia     Hypertension     Ischemic cardiomyopathy     Mitral regurgitation     [facility])        PSH:  Past Surgical History:   Procedure Laterality Date    HX CAROTID ENDARTERECTOMY      HX CORONARY ARTERY BYPASS GRAFT  2005       Allergies: Allergies   Allergen Reactions    Omnipaque  [Iohexol] Hives    Iodine Rash    Unable To Obtain Unknown (comments)       Home Medications:  Prior to Admission medications    Medication Sig Start Date End Date Taking? Authorizing Provider   allopurinoL (ZYLOPRIM) 300 mg tablet Take 1 Tablet by mouth daily. 3/2/22  Yes Afsaneh Domingo NP   albuterol (Ventolin HFA) 90 mcg/actuation inhaler Take 2 Puffs by inhalation every four (4) hours as needed for Wheezing. 3/2/22  Yes Aurora Akers NP   food supplemt, lactose-reduced (Ensure High Protein) liqd Take 237 mL by mouth four (4) times daily. 3/2/22  Yes Afsaneh Domingo NP   clopidogreL (PLAVIX) 75 mg tab Take 1 Tablet by mouth daily. 3/2/22  Yes Afsaneh Domingo NP   glipiZIDE (GLUCOTROL) 5 mg tablet Take 1 Tablet by mouth daily. 3/2/22  Yes Afsaneh Domingo NP   budesonide-formoteroL (SYMBICORT) 80-4.5 mcg/actuation HFAA Take 2 Puffs by inhalation two (2) times a day. 3/2/22  Yes Afsaneh Domingo NP   lisinopriL (PRINIVIL, ZESTRIL) 40 mg tablet Take 1 Tablet by mouth daily. 12/28/21  Yes Julian Erazo DO   gabapentin (NEURONTIN) 400 mg capsule Take 400 mg by mouth. 6/27/21  Yes Provider, Historical   tamsulosin (FLOMAX) 0.4 mg capsule Take 0.4 mg by mouth daily. 8/21/21  Yes Provider, Historical   pantoprazole (PROTONIX) 40 mg tablet Take 40 mg by mouth. 5/17/21  Yes Provider, Historical   diclofenac (VOLTAREN) 1 % gel Apply  to affected area four (4) times daily. 3/31/21  Yes Afsaneh Domingo NP   carvediloL (COREG) 25 mg tablet Take 1 Tab by mouth two (2) times a day.  1/5/21  Yes Julian Erazo DO   atorvastatin (LIPITOR) 40 mg tablet Take 1 Tab by mouth daily. 1/5/21  Yes Deontetorrie Hernandez DO   torsemide (DEMADEX) 20 mg tablet Take 1 Tab by mouth daily. 1/5/21  Yes Deonte Hones, DO   fluticasone propionate (FLONASE) 50 mcg/actuation nasal spray 2 Sprays by Both Nostrils route daily. 12/30/20  Yes Afsaneh Domingo NP   melatonin 10 mg tab Take 1 tab PO at HS for insomnia. 8/10/20  Yes Solomon Stevens NP   OneTouch Ultra Blue Test Strip strip USE TO TEST BLOOD GLUCOSE AS DIRECTED ONCE EVERY DAY 6/23/20  Yes Solomon Stevens NP   OTHER Dispense single pair of tedhose. Dx: dependent edema lower extremities 4/21/20  Yes Solomon Stevens NP   Amanda Homme ULTRA2 METER misc USE AS DIRECTED DAILY FOR TESTING BLOOD SUGAR 10/10/19  Yes Solomon Stevens NP   Back Brace misc Use back brace as directed for 4-6 hours daily. DX: DDD 7/2/19  Yes Solomon Stevens NP   ONE [facility] 33 gauge misc  0/0/36  Yes Provider, Historical   lancets (ACCU-CHEK FASTCLIX LANCET DRUM) misc Once daily 2/4/19  Yes Bertrand Cesar MD   aspirin (ASPIRIN) 325 mg tablet Take 325 mg by mouth daily. Yes Provider, Historical   pantoprazole (PROTONIX) 40 mg tablet Take 1 Tablet by mouth daily.   Patient not taking: Reported on 3/10/2022 3/2/22   Solomon Stevens NP   HYDROcodone-acetaminophen (NORCO) 7.5-325 mg per tablet TK 1 T PO TID PRN FOR 30 DAYS  Patient not taking: Reported on 3/10/2022 7/14/20   Provider, Historical       Hospital Medications:  Current Facility-Administered Medications   Medication Dose Route Frequency    sodium chloride (NS) flush 5-40 mL  5-40 mL IntraVENous Q8H    sodium chloride (NS) flush 5-40 mL  5-40 mL IntraVENous PRN    polyethylene glycol (MIRALAX) packet 17 g  17 g Oral DAILY PRN    ondansetron (ZOFRAN ODT) tablet 4 mg  4 mg Oral Q8H PRN    Or    ondansetron (ZOFRAN) injection 4 mg  4 mg IntraVENous Q6H PRN    albuterol-ipratropium (DUO-NEB) 2.5 MG-0.5 MG/3 ML  3 mL Nebulization NOW    [START ON 3/11/2022] atorvastatin (LIPITOR) tablet 40 mg  40 mg Oral DAILY    budesonide-formoterol (SYMBICORT) 80-4.5 mcg inhaler  2 Puff Inhalation BID    carvediloL (COREG) tablet 12.5 mg  12.5 mg Oral BID    [Held by provider] clopidogreL (PLAVIX) tablet 75 mg  75 mg Oral DAILY    [Held by provider] aspirin tablet 325 mg  325 mg Oral DAILY    [Held by provider] allopurinoL (ZYLOPRIM) tablet 300 mg  300 mg Oral DAILY    gabapentin (NEURONTIN) capsule 100 mg  100 mg Oral BID    [Held by provider] glipiZIDE (GLUCOTROL) tablet 5 mg  5 mg Oral DAILY    [Held by provider] lisinopriL (PRINIVIL, ZESTRIL) tablet 40 mg  40 mg Oral DAILY    [START ON 3/11/2022] pantoprazole (PROTONIX) tablet 40 mg  40 mg Oral DAILY    [START ON 3/11/2022] tamsulosin (FLOMAX) capsule 0.4 mg  0.4 mg Oral DAILY    [Held by provider] torsemide (DEMADEX) tablet 20 mg  20 mg Oral DAILY    nicotine (NICODERM CQ) 14 mg/24 hr patch 1 Patch  1 Patch TransDERmal Q24H    [START ON 3/11/2022] ferrous sulfate tablet 325 mg  1 Tablet Oral DAILY WITH BREAKFAST    insulin lispro (HUMALOG) injection   SubCUTAneous AC&HS    pantoprazole (PROTONIX) 40 mg in 0.9% sodium chloride 10 mL injection  40 mg IntraVENous Q12H    melatonin tablet 10 mg  10 mg Oral QHS       Social History:  Social History     Tobacco Use    Smoking status: Current Every Day Smoker     Packs/day: 0.60     Years: 30.00     Pack years: 18.00    Smokeless tobacco: Never Used    Tobacco comment: almost a pack a day now, been smoking since 24years old   Substance Use Topics    Alcohol use: Yes     Alcohol/week: 1.0 standard drink     Types: 1 Cans of beer per week     Comment: a week     Pt denies any history of IV drug use, blood transfusions, or tattoos.     Family History:  Family History   Problem Relation Age of Onset    Diabetes Mother     Heart Attack Father     Diabetes Brother     Hypertension Brother        Review of Systems:  A detailed 10 system ROS is obtained, with pertinent positives as listed above. All others are negative. Diet:      Objective:     Physical Exam:  Vitals:  Visit Vitals  BP (!) 124/59 (BP 1 Location: Left upper arm, BP Patient Position: At rest)   Pulse 83   Temp 98.3 °F (36.8 °C)   Resp 18   Ht 5' 9\" (1.753 m)   Wt 79.4 kg (175 lb)   SpO2 95%   BMI 25.84 kg/m²     Gen:  Pt is alert, cooperative, no acute distress  Skin:  Extremities and face reveal no rashes. No palmar erythema. No telangiectasias on the chest wall. HEENT: Sclerae anicteric. Extra-occular muscles are intact. No oral ulcers. No abnormal pigmentation of the lips. The neck is supple. Cardiovascular: Regular rate and rhythm. No murmurs, gallops, or rubs. Respiratory:  Comfortable breathing with no accessory muscle use. Clear breath sounds anteriorly with no wheezes, rales, or rhonchi. GI:  Abdomen nondistended, soft, and nontender. Normal active bowel sounds. No enlargement of the liver or spleen. No masses palpable. Rectal:  Deferred  Musculoskeletal:  No pitting edema of the lower legs. Extremities have good range of motion. SCDs  Neurological:  Gross memory appears intact. Patient is alert and oriented. Psychiatric:  Mood appears appropriate with judgement intact. Lymphatic:  No cervical or supraclavicular adenopathy. Laboratory:    Recent Labs     03/10/22  1322   WBC 4.0*   HGB 7.9*   HCT 27.8*   *   MCV 81.5      K 3.6   *   CO2 30   BUN 31*   CREA 1.24   CA 8.6   *   *   ALB 3.1*   TP 7.2          Assessment:       65-year-old male patient of Dr. Sarahy Ocampo with past medical history of CAD status post CABG on aspirin and Plavix, diabetes, diabetic neuropathy, peripheral vascular disease, carotid endarterectomy, active smoking who was seen at the request of Dr. Aubrie Jain for anemia. He was referred to the emergency room by his primary care physician for low hemoglobin. He has weight loss and loss of appetite.      He has acute on chronic normocytic anemia with a new increase in RDW. There has been no overt bleeding. He is currently eating solid food. Plan:       1. Iron panel  2. EGD tomorrow for weight loss and loss of appetite  3. Colonoscopy outpatient if no overt bleeding  4. NPO at [place]  5.  Transfuse as needed    Luis Fernando Ohara MD  [facility], [place]

## 2022-03-10 NOTE — H&P
Hospitalist History and Physical   Admit Date:  3/10/2022  1:29 PM   Name:  Shawnee Acharya   Age:  67 y.o. Sex:  male  :  1945   MRN:  [id number]   Room:  02/02    Presenting Complaint: Abnormal Lab Results    Reason(s) for Admission: GI bleed [K92.2]     History of Present Illness:   Shawnee Acharya is a 67 y.o. male, lives alone, ambulates with walker with medical history GERD, CHF, CKD, COPD, CAD s/p CABG 9 years ago, diabetes mellitus, diabetic neuropathy, hyperlipidemia, hypertension, MR, PVD, Ischemic cardiomyopathy, active smoker, hard of hearing  and past surgical history of carotid endarterectomy of who presented to ED with low hemoglobin. He was referred by PCP due to abnormal lab result with low hemoglobin of 7.6. Patient complains of shortness of breath on exertion, weakness, fatigue, tiredness, weight loss of 25 pounds in 6 months, loss of appetite. He never had colonoscopy before. He is using aspirin Plavix since his CABG surgery. He is following with his cardiologist.  Patient denies dizziness, blood in the stools, melena, nausea, vomiting, chest pain, palpitations, NSAID use. ED course : Vitals stable. Labs remarkable for hemoglobin of 7.9/hematocrit 27.8, potassium 3.6, BUN 31/ Cr 1.24. FOBT positive. He was treated with pantoprazole. Admitted to hospitalist for evaluation of GI bleed. Review of Systems:  10 systems reviewed and negative except as noted in HPI.   Assessment & Plan:   GI bleed (3/10/2022)    Acute blood loss anemia and GI bleed   Possible peptic ulcer disease vs gastritis vs esophagitis vs malignancy  FOBT positive in ED  Started on  Protonix 40 mg IV twice daily  Follow-up with CT chest and abdomen and pelvis as there is concern for malignancy  Monitor H&H q 8hrs  NPO for now  GI consulted for possible EGD and colonoscopy    Acute blood loss anemia  Iron studies were done ON 3/2 and it is iron deficiency anemia  Started on ferrous sulfate p.o. OSMEL on CKD  Most likely due to CHF   Creatinine is 1.24 and baseline is 1.1  Follow-up with urine electrolytes  Lisinopril is on hold  Allopurinol is on hold  Gabapentin dose is decreased from 400 mg twice daily to 100 mg twice daily  Avoid Nephrotoxic drugs    CAD s/p CABG   Holding home medication ASA and Plavix for now     CHF  Resumed home medication Coreg and Lasix  Follow-up with ECHO  Cardiologist is consulted    GERD  Continue with pantoprazole    Diabetes   A1c is 6.7 on 3/2  Home medication Glipizide is on hold.    Insulin sliding scale for now    BPH  Continue home medication Flomax    Active smoker  Counseled regarding smoking cessation  Started on nicotine patch    Dispo/Discharge Planning: Home in 1 to 2 days    Diet: NPO  VTE ppx: SCDs  Code status: Full Code    Hospital Problems as of 3/10/2022 Date Reviewed: 4/22/2020          Codes Class Noted - Resolved POA    GI bleed ICD-10-CM: K92.2  ICD-9-CM: 578.9  3/10/2022 - Present Unknown              Past History:  Past Medical History:   Diagnosis Date    BPH (benign prostatic hyperplasia)     CHF (NYHA class III, ACC/AHA stage C) (HCC)     Chronic kidney disease     CKD (chronic kidney disease)     COPD (chronic obstructive pulmonary disease) (HCC)     Coronary atherosclerosis of native coronary artery     Diabetes mellitus with circulatory complication (HCC)     DM neuropathies (HCC)     Dyslipidemia     GERD (gastroesophageal reflux disease)     Hyperlipidemia     Hypertension     Ischemic cardiomyopathy     Mitral regurgitation     PVD (peripheral vascular disease) (HCC)      Past Surgical History:   Procedure Laterality Date    HX CAROTID ENDARTERECTOMY      HX CORONARY ARTERY BYPASS GRAFT  2005      Allergies   Allergen Reactions    Omnipaque  [Iohexol] Hives    Iodine Rash    Unable To Obtain Unknown (comments)      Social History     Tobacco Use    Smoking status: Current Every Day Smoker     Packs/day: 0.60 Years: 30.00     Pack years: 18.00    Smokeless tobacco: Never Used    Tobacco comment: almost a pack a day now, been smoking since 24years old   Substance Use Topics    Alcohol use: Yes     Alcohol/week: 1.0 standard drink     Types: 1 Cans of beer per week     Comment: a week      Family History   Problem Relation Age of Onset    Diabetes Mother     Heart Attack Father     Diabetes Brother     Hypertension Brother       Family history reviewed and negative except as noted above. Immunization History   Administered Date(s) Administered    COVID-19, J&J, PF, 0.5 mL Dose 07/19/2021    Influenza Vaccine 09/20/2016    Influenza Vaccine (Quad) PF (>6 Mo Flulaval, Fluarix, and >3 Yrs Afluria, Fluzone 62519) 10/01/2018    Influenza Vaccine (Tri) Adjuvanted (>65 Yrs FLUAD TRI 81227) 03/04/2020    Pneumococcal Polysaccharide (PPSV-23) 03/04/2020    Zoster Vaccine, Live 01/21/2014     Prior to Admit Medications:  Current Outpatient Medications   Medication Instructions    albuterol (Ventolin HFA) 90 mcg/actuation inhaler 2 Puffs, Inhalation, EVERY 4 HOURS AS NEEDED    allopurinoL (ZYLOPRIM) 300 mg, Oral, DAILY    aspirin (ASPIRIN) 325 mg, Oral, DAILY    atorvastatin (LIPITOR) 40 mg, Oral, DAILY    Back Brace misc Use back brace as directed for 4-6 hours daily.  DX: DDD    budesonide-formoteroL (SYMBICORT) 80-4.5 mcg/actuation HFAA 2 Puffs, Inhalation, 2 TIMES DAILY    carvediloL (COREG) 25 mg, Oral, 2 TIMES DAILY    clopidogreL (PLAVIX) 75 mg, Oral, DAILY    diclofenac (VOLTAREN) 1 % gel Topical, 4 TIMES DAILY    fluticasone propionate (FLONASE) 50 mcg/actuation nasal spray 2 Sprays, Both Nostrils, DAILY    food supplemt, lactose-reduced (Ensure High Protein) liqd 237 mL, Oral, 4 TIMES DAILY    gabapentin (NEURONTIN) 400 mg, Oral    glipiZIDE (GLUCOTROL) 5 mg, Oral, DAILY    HYDROcodone-acetaminophen (NORCO) 7.5-325 mg per tablet TK 1 T PO TID PRN FOR 30 DAYS    lancets (ACCU-CHEK FASTCLIX LANCET DRUM) misc Once daily    lisinopriL (PRINIVIL, ZESTRIL) 40 mg, Oral, DAILY    melatonin 10 mg tab Take 1 tab PO at HS for insomnia.  ONE TOUCH DELICA 33 gauge misc No dose, route, or frequency recorded.  OneTouch Ultra Blue Test Strip strip USE TO TEST BLOOD GLUCOSE AS DIRECTED ONCE EVERY DAY    ONETOUCH ULTRA2 METER misc USE AS DIRECTED DAILY FOR TESTING BLOOD SUGAR    OTHER Dispense single pair of tedhose. Dx: dependent edema lower extremities    pantoprazole (PROTONIX) 40 mg, Oral    pantoprazole (PROTONIX) 40 mg, Oral, DAILY    tamsulosin (FLOMAX) 0.4 mg, Oral, DAILY    torsemide (DEMADEX) 20 mg, Oral, DAILY       Objective:     Patient Vitals for the past 24 hrs:   Temp Pulse Resp BP SpO2   03/10/22 1312 98.3 °F (36.8 °C) 90 18 (!) 124/59 99 %     Oxygen Therapy  O2 Sat (%): 99 % (03/10/22 1312)  O2 Device: None (Room air) (03/10/22 1312)    Estimated body mass index is 25.84 kg/m² as calculated from the following:    Height as of this encounter: 5' 9\" (1.753 m). Weight as of this encounter: 79.4 kg (175 lb). No intake or output data in the 24 hours ending 03/10/22 1533      Physical Exam:  Blood pressure (!) 124/59, pulse 90, temperature 98.3 °F (36.8 °C), resp. rate 18, height 5' 9\" (1.753 m), weight 79.4 kg (175 lb), SpO2 99 %. General:    Well nourished. No overt distress  Head:  Normocephalic, atraumatic  Eyes:  Sclerae appear normal.  Pupils equally round. ENT:  Nares appear normal, no drainage. Moist oral mucosa  Neck:  No restricted ROM. Trachea midline   CV:   RRR. No m/r/g. No jugular venous distension. Lungs:   CTAB. No wheezing, rhonchi, or rales. Respirations even, unlabored  Abdomen: Bowel sounds present. Soft, nontender, nondistended. Extremities: No cyanosis or clubbing. Bilateral pitting edema +2  Skin:     No rashes and normal coloration. Warm and dry. Neuro:  CN II-XII grossly intact. Sensation intact. A&Ox3  Psych:  Normal mood and affect.       I have reviewed ordered lab tests and independently visualized imaging below:    Last 24hr Labs:  Recent Results (from the past 24 hour(s))   CBC WITH AUTOMATED DIFF    Collection Time: 03/10/22  1:22 PM   Result Value Ref Range    WBC 4.0 (L) 4.3 - 11.1 K/uL    RBC 3.41 (L) 4.23 - 5.6 M/uL    HGB 7.9 (L) 13.6 - 17.2 g/dL    HCT 27.8 (L) 41.1 - 50.3 %    MCV 81.5 79.6 - 97.8 FL    MCH 23.2 (L) 26.1 - 32.9 PG    MCHC 28.4 (L) 31.4 - 35.0 g/dL    RDW 19.9 (H) 11.9 - 14.6 %    PLATELET 427 (L) 832 - 450 K/uL    MPV 10.2 9.4 - 12.3 FL    ABSOLUTE NRBC 0.00 0.0 - 0.2 K/uL    DF AUTOMATED      NEUTROPHILS 65 43 - 78 %    LYMPHOCYTES 20 13 - 44 %    MONOCYTES 10 4.0 - 12.0 %    EOSINOPHILS 5 0.5 - 7.8 %    BASOPHILS 1 0.0 - 2.0 %    IMMATURE GRANULOCYTES 0 0.0 - 5.0 %    ABS. NEUTROPHILS 2.6 1.7 - 8.2 K/UL    ABS. LYMPHOCYTES 0.8 0.5 - 4.6 K/UL    ABS. MONOCYTES 0.4 0.1 - 1.3 K/UL    ABS. EOSINOPHILS 0.2 0.0 - 0.8 K/UL    ABS. BASOPHILS 0.0 0.0 - 0.2 K/UL    ABS. IMM. GRANS. 0.0 0.0 - 0.5 K/UL   METABOLIC PANEL, COMPREHENSIVE    Collection Time: 03/10/22  1:22 PM   Result Value Ref Range    Sodium 139 136 - 145 mmol/L    Potassium 3.6 3.5 - 5.1 mmol/L    Chloride 109 (H) 98 - 107 mmol/L    CO2 30 21 - 32 mmol/L    Anion gap 0 (L) 7 - 16 mmol/L    Glucose 146 (H) 65 - 100 mg/dL    BUN 31 (H) 8 - 23 MG/DL    Creatinine 1.24 0.8 - 1.5 MG/DL    GFR est AA >60 >60 ml/min/1.73m2    GFR est non-AA >60 >60 ml/min/1.73m2    Calcium 8.6 8.3 - 10.4 MG/DL    Bilirubin, total 0.4 0.2 - 1.1 MG/DL    ALT (SGPT) 35 12 - 65 U/L    AST (SGOT) 39 (H) 15 - 37 U/L    Alk.  phosphatase 148 (H) 50 - 136 U/L    Protein, total 7.2 6.3 - 8.2 g/dL    Albumin 3.1 (L) 3.2 - 4.6 g/dL    Globulin 4.1 (H) 2.3 - 3.5 g/dL    A-G Ratio 0.8 (L) 1.2 - 3.5     TYPE & SCREEN    Collection Time: 03/10/22  1:22 PM   Result Value Ref Range    Crossmatch Expiration 03/13/2022,2359     ABO/Rh(D) O POSITIVE     Antibody screen NEG    POC FECAL OCCULT BLOOD    Collection Time: 03/10/22  2:36 PM   Result Value Ref Range    Occult blood, stool (POC) Positive (A) NEG         All Micro Results     None          Other Studies:  No results found. Medications Administered     pantoprazole (PROTONIX) 40 mg in 0.9% sodium chloride 10 mL injection     Admin Date  03/10/2022 Action  Given Dose  40 mg Route  IntraVENous Administered By  Shan Burgos RN          sodium chloride 0.9 % bolus infusion 1,000 mL     Admin Date  03/10/2022 Action  New Bag Dose  1,000 mL Route  IntraVENous Administered By  Shan Burgos RN                Signed:  Mira Altman MD    Part of this note may have been written by using a voice dictation software. The note has been proof read but may still contain some grammatical/other typographical errors.

## 2022-03-10 NOTE — ED PROVIDER NOTES
[facility] Rukhsana Lee is a 67 y.o. male seen on 3/10/2022 in the [facility] EMERGENCY DEPT in room ER02/02. Chief Complaint   Patient presents with    Abnormal Lab Results     HPI: 58-year-old -American male with history of GERD, CAD and ischemic cardiomyopathy presented to the emergency department from his family doctor with complaints of low hemoglobin. Patient's hemoglobin was 7.6 in the office yesterday and has been trending down over the past 6 months. Patient is on Plavix. He denies any known blood in his stool or any other blood loss. He states his stools have been brown and green. He denies any abdominal pain. Patient does state that he is more fatigued than normal and cannot do things that he normally would like to do because of this fatigue. Patient states that he has had to cut back at work and only work half days because of this fatigue. He denies any chest pain, shortness of breath, abdominal pain, changes in bowel or bladder habits or any other concerns. Historian: Patient    REVIEW OF SYSTEMS     Review of Systems   Constitutional: Positive for fatigue. HENT: Negative. Respiratory: Negative. Cardiovascular: Negative. Gastrointestinal: Negative. Genitourinary: Negative. Musculoskeletal: Negative. Skin: Negative. Neurological: Negative. Psychiatric/Behavioral: Negative. All other systems reviewed and are negative.       PAST MEDICAL HISTORY     Past Medical History:   Diagnosis Date    BPH (benign prostatic hyperplasia)     CHF (NYHA class III, ACC/AHA stage C) (HCC)     Chronic kidney disease     CKD (chronic kidney disease)     COPD (chronic obstructive pulmonary disease) (HCC)     Coronary atherosclerosis of native coronary artery     Diabetes mellitus with circulatory complication (HCC)     DM neuropathies (HCC)     Dyslipidemia     GERD (gastroesophageal reflux disease)     Hyperlipidemia     Hypertension     Ischemic cardiomyopathy     Mitral regurgitation     PVD (peripheral vascular disease) ([facility])      Past Surgical History:   Procedure Laterality Date    HX CAROTID ENDARTERECTOMY      HX CORONARY ARTERY BYPASS GRAFT  2005     Social History     Socioeconomic History    Marital status: SINGLE   Tobacco Use    Smoking status: Current Every Day Smoker     Packs/day: 0.60     Years: 30.00     Pack years: 18.00    Smokeless tobacco: Never Used    Tobacco comment: almost a pack a day now, been smoking since 24years old   Vaping Use    Vaping Use: Never used   Substance and Sexual Activity    Alcohol use: Yes     Alcohol/week: 1.0 standard drink     Types: 1 Cans of beer per week     Comment: a week    Drug use: No     Prior to Admission Medications   Prescriptions Last Dose Informant Patient Reported? Taking? Back Brace misc   No No   Sig: Use back brace as directed for 4-6 hours daily. DX: DDD   HYDROcodone-acetaminophen (NORCO) 7.5-325 mg per tablet   Yes No   Sig: TK 1 T PO TID PRN FOR 30 DAYS   ONE TOUCH DELICA 33 gauge misc   Yes No   ONETOUCH ULTRA2 METER misc   No No   Sig: USE AS DIRECTED DAILY FOR TESTING BLOOD SUGAR   OTHER   No No   Sig: Dispense single pair of tedhose. Dx: dependent edema lower extremities   OneTouch Ultra Blue Test Strip strip   No No   Sig: USE TO TEST BLOOD GLUCOSE AS DIRECTED ONCE EVERY DAY   albuterol (Ventolin HFA) 90 mcg/actuation inhaler   No No   Sig: Take 2 Puffs by inhalation every four (4) hours as needed for Wheezing. allopurinoL (ZYLOPRIM) 300 mg tablet   No No   Sig: Take 1 Tablet by mouth daily. aspirin (ASPIRIN) 325 mg tablet   Yes No   Sig: Take 325 mg by mouth daily. atorvastatin (LIPITOR) 40 mg tablet   No No   Sig: Take 1 Tab by mouth daily. budesonide-formoteroL (SYMBICORT) 80-4.5 mcg/actuation HFAA   No No   Sig: Take 2 Puffs by inhalation two (2) times a day.    carvediloL (COREG) 25 mg tablet   No No   Sig: Take 1 Tab by mouth two (2) times a day. clopidogreL (PLAVIX) 75 mg tab   No No   Sig: Take 1 Tablet by mouth daily. diclofenac (VOLTAREN) 1 % gel   No No   Sig: Apply  to affected area four (4) times daily. fluticasone propionate (FLONASE) 50 mcg/actuation nasal spray   No No   Si Sprays by Both Nostrils route daily. food supplemt, lactose-reduced (Ensure High Protein) liqd   No No   Sig: Take 237 mL by mouth four (4) times daily. gabapentin (NEURONTIN) 400 mg capsule   Yes No   Sig: Take 400 mg by mouth. Patient not taking: Reported on 3/2/2022   glipiZIDE (GLUCOTROL) 5 mg tablet   No No   Sig: Take 1 Tablet by mouth daily. lancets (ACCU-CHEK FASTCLIX LANCET DRUM) misc   No No   Sig: Once daily   lisinopriL (PRINIVIL, ZESTRIL) 40 mg tablet   No No   Sig: Take 1 Tablet by mouth daily. melatonin 10 mg tab   No No   Sig: Take 1 tab PO at HS for insomnia. pantoprazole (PROTONIX) 40 mg tablet   Yes No   Sig: Take 40 mg by mouth.   pantoprazole (PROTONIX) 40 mg tablet   No No   Sig: Take 1 Tablet by mouth daily. tamsulosin (FLOMAX) 0.4 mg capsule   Yes No   Sig: Take 0.4 mg by mouth daily. torsemide (DEMADEX) 20 mg tablet   No No   Sig: Take 1 Tab by mouth daily. Facility-Administered Medications: None     Allergies   Allergen Reactions    Omnipaque  [Iohexol] Hives    Iodine Rash    Unable To Obtain Unknown (comments)        PHYSICAL EXAM       Vitals:    03/10/22 1312   BP: (!) 124/59   Pulse: 90   Resp: 18   Temp: 98.3 °F (36.8 °C)   SpO2: 99%    Vital signs were reviewed. Physical Exam  Vitals and nursing note reviewed. Constitutional:       General: He is not in acute distress. Appearance: Normal appearance. He is not ill-appearing or toxic-appearing. HENT:      Head: Normocephalic and atraumatic. Mouth/Throat:      Mouth: Mucous membranes are moist.   Eyes:      Extraocular Movements: Extraocular movements intact. Pupils: Pupils are equal, round, and reactive to light. Comments: Pale conjunctiva   Cardiovascular:      Rate and Rhythm: Normal rate and regular rhythm. Pulses: Normal pulses. Heart sounds: Normal heart sounds. Pulmonary:      Effort: Pulmonary effort is normal.      Breath sounds: Normal breath sounds. Abdominal:      Palpations: Abdomen is soft. Tenderness: There is no abdominal tenderness. There is no guarding or rebound. Genitourinary:     Prostate: Normal.      Rectum: Normal. Guaiac result positive. Musculoskeletal:         General: Normal range of motion. Cervical back: Normal range of motion and neck supple. Skin:     General: Skin is warm and dry. Neurological:      General: No focal deficit present. Mental Status: He is alert and oriented to person, place, and time. Psychiatric:         Mood and Affect: Mood normal.         Behavior: Behavior normal.         Thought Content: Thought content normal.         Judgment: Judgment normal.          MEDICAL DECISION MAKING     ED Course:    Orders Placed This Encounter    CBC WITH AUTOMATED DIFF    METABOLIC PANEL, COMPREHENSIVE    TYPE & SCREEN    SALINE LOCK IV ONE TIME STAT    sodium chloride 0.9 % bolus infusion 1,000 mL    pantoprazole (PROTONIX) 40 mg in 0.9% sodium chloride 10 mL injection     Recent Results (from the past 8 hour(s))   CBC WITH AUTOMATED DIFF    Collection Time: 03/10/22  1:22 PM   Result Value Ref Range    WBC 4.0 (L) 4.3 - 11.1 K/uL    RBC 3.41 (L) 4.23 - 5.6 M/uL    HGB 7.9 (L) 13.6 - 17.2 g/dL    HCT 27.8 (L) 41.1 - 50.3 %    MCV 81.5 79.6 - 97.8 FL    MCH 23.2 (L) 26.1 - 32.9 PG    MCHC 28.4 (L) 31.4 - 35.0 g/dL    RDW 19.9 (H) 11.9 - 14.6 %    PLATELET 096 (L) 383 - 450 K/uL    MPV 10.2 9.4 - 12.3 FL    ABSOLUTE NRBC 0.00 0.0 - 0.2 K/uL    DF AUTOMATED      NEUTROPHILS 65 43 - 78 %    LYMPHOCYTES 20 13 - 44 %    MONOCYTES 10 4.0 - 12.0 %    EOSINOPHILS 5 0.5 - 7.8 %    BASOPHILS 1 0.0 - 2.0 %    IMMATURE GRANULOCYTES 0 0.0 - 5.0 %    ABS. NEUTROPHILS 2.6 1.7 - 8.2 K/UL    ABS. LYMPHOCYTES 0.8 0.5 - 4.6 K/UL    ABS. MONOCYTES 0.4 0.1 - 1.3 K/UL    ABS. EOSINOPHILS 0.2 0.0 - 0.8 K/UL    ABS. BASOPHILS 0.0 0.0 - 0.2 K/UL    ABS. IMM. GRANS. 0.0 0.0 - 0.5 K/UL   METABOLIC PANEL, COMPREHENSIVE    Collection Time: 03/10/22  1:22 PM   Result Value Ref Range    Sodium 139 136 - 145 mmol/L    Potassium 3.6 3.5 - 5.1 mmol/L    Chloride 109 (H) 98 - 107 mmol/L    CO2 30 21 - 32 mmol/L    Anion gap 0 (L) 7 - 16 mmol/L    Glucose 146 (H) 65 - 100 mg/dL    BUN 31 (H) 8 - 23 MG/DL    Creatinine 1.24 0.8 - 1.5 MG/DL    GFR est AA >60 >60 ml/min/1.73m2    GFR est non-AA >60 >60 ml/min/1.73m2    Calcium 8.6 8.3 - 10.4 MG/DL    Bilirubin, total 0.4 0.2 - 1.1 MG/DL    ALT (SGPT) 35 12 - 65 U/L    AST (SGOT) 39 (H) 15 - 37 U/L    Alk. phosphatase 148 (H) 50 - 136 U/L    Protein, total 7.2 6.3 - 8.2 g/dL    Albumin 3.1 (L) 3.2 - 4.6 g/dL    Globulin 4.1 (H) 2.3 - 3.5 g/dL    A-G Ratio 0.8 (L) 1.2 - 3.5     TYPE & SCREEN    Collection Time: 03/10/22  1:22 PM   Result Value Ref Range    Crossmatch Expiration 03/13/2022,2359     ABO/Rh(D) O POSITIVE     Antibody screen NEG      No results found. MDM  Number of Diagnoses or Management Options  Diagnosis management comments: 48-year-old male with history of CAD and ischemic cardiomyopathy on Plavix presented to the emergency department with complaints of low hemoglobin and associated fatigue. Patient's hemoglobin has been trending downward. His Hemoccult is positive. Patient given IV Protonix and IV fluids in the emergency department. He will be admitted to the hospitalist for further treatment and evaluation with trending of hemoglobins.        Amount and/or Complexity of Data Reviewed  Clinical lab tests: reviewed  Decide to obtain previous medical records or to obtain history from someone other than the patient: yes  Review and summarize past medical records: yes  Discuss the patient with other providers: yes  Independent visualization of images, tracings, or specimens: yes    Patient Progress  Patient progress: stable        Disposition:  Admitted  Diagnosis:     ICD-10-CM ICD-9-CM   1. Symptomatic anemia  D64.9 285.9   2. Upper GI bleed  K92.2 578.9     ____________________________________________________________________  A portion of this note was generated using voice recognition dictation software. While the note has been reviewed for accuracy, please note certain words and phrases may not be transcribed as intended and some grammatical and/or typographical errors may be present.

## 2022-03-11 ENCOUNTER — ANESTHESIA EVENT (OUTPATIENT)
Dept: ENDOSCOPY | Age: 77
End: 2022-03-11
Payer: MEDICARE

## 2022-03-11 ENCOUNTER — ANESTHESIA (OUTPATIENT)
Dept: ENDOSCOPY | Age: 77
End: 2022-03-11
Payer: MEDICARE

## 2022-03-11 ENCOUNTER — APPOINTMENT (OUTPATIENT)
Dept: NON INVASIVE DIAGNOSTICS | Age: 77
End: 2022-03-11
Attending: STUDENT IN AN ORGANIZED HEALTH CARE EDUCATION/TRAINING PROGRAM
Payer: MEDICARE

## 2022-03-11 LAB
ALBUMIN SERPL-MCNC: 2.3 G/DL (ref 3.2–4.6)
ALBUMIN/GLOB SERPL: 0.7 {RATIO} (ref 1.2–3.5)
ALP SERPL-CCNC: 114 U/L (ref 50–136)
ALT SERPL-CCNC: 24 U/L (ref 12–65)
ANION GAP SERPL CALC-SCNC: 3 MMOL/L (ref 7–16)
APPEARANCE UR: CLEAR
AST SERPL-CCNC: 21 U/L (ref 15–37)
BASOPHILS # BLD: 0 K/UL (ref 0–0.2)
BASOPHILS NFR BLD: 1 % (ref 0–2)
BILIRUB SERPL-MCNC: 0.3 MG/DL (ref 0.2–1.1)
BILIRUB UR QL: NEGATIVE
BNP SERPL-MCNC: ABNORMAL PG/ML
BUN SERPL-MCNC: 32 MG/DL (ref 8–23)
CALCIUM SERPL-MCNC: 8.1 MG/DL (ref 8.3–10.4)
CHLORIDE SERPL-SCNC: 112 MMOL/L (ref 98–107)
CHLORIDE UR-SCNC: 125 MMOL/L
CO2 SERPL-SCNC: 27 MMOL/L (ref 21–32)
COLOR UR: YELLOW
CREAT SERPL-MCNC: 1.15 MG/DL (ref 0.8–1.5)
CREAT UR-MCNC: 16 MG/DL
DIFFERENTIAL METHOD BLD: ABNORMAL
ECHO AO ASC DIAM: 3.3 CM
ECHO AO ASCENDING AORTA INDEX: 1.69 CM/M2
ECHO AO SINUS VALSALVA DIAM: 4 CM
ECHO AO SINUS VALSALVA INDEX: 2.05 CM/M2
ECHO AV AREA PEAK VELOCITY: 3.5 CM2
ECHO AV AREA VTI: 3 CM2
ECHO AV AREA/BSA PEAK VELOCITY: 1.8 CM2/M2
ECHO AV AREA/BSA VTI: 1.5 CM2/M2
ECHO AV MEAN GRADIENT: 2 MMHG
ECHO AV MEAN VELOCITY: 0.6 M/S
ECHO AV PEAK GRADIENT: 3 MMHG
ECHO AV PEAK VELOCITY: 0.9 M/S
ECHO AV VELOCITY RATIO: 0.89
ECHO AV VTI: 16.6 CM
ECHO IVC EXP: 2 CM
ECHO LA AREA 2C: 20.6 CM2
ECHO LA AREA 4C: 19.7 CM2
ECHO LA DIAMETER INDEX: 2.1 CM/M2
ECHO LA DIAMETER: 4.1 CM
ECHO LA MAJOR AXIS: 4.9 CM
ECHO LA MINOR AXIS: 5.8 CM
ECHO LV E' LATERAL VELOCITY: 10 CM/S
ECHO LV E' SEPTAL VELOCITY: 6 CM/S
ECHO LV EDV A2C: 225 ML
ECHO LV EDV A4C: 189 ML
ECHO LV EDV INDEX A4C: 97 ML/M2
ECHO LV EDV NDEX A2C: 115 ML/M2
ECHO LV EJECTION FRACTION A2C: 29 %
ECHO LV ESV A2C: 160 ML
ECHO LV ESV A4C: 131 ML
ECHO LV ESV INDEX A2C: 82 ML/M2
ECHO LV ESV INDEX A4C: 67 ML/M2
ECHO LV FRACTIONAL SHORTENING: 14 % (ref 28–44)
ECHO LV INTERNAL DIMENSION DIASTOLE INDEX: 2.97 CM/M2
ECHO LV INTERNAL DIMENSION DIASTOLIC: 5.8 CM (ref 4.2–5.9)
ECHO LV INTERNAL DIMENSION SYSTOLIC INDEX: 2.56 CM/M2
ECHO LV INTERNAL DIMENSION SYSTOLIC: 5 CM
ECHO LV IVSD: 1.1 CM (ref 0.6–1)
ECHO LV MASS 2D: 248.5 G (ref 88–224)
ECHO LV MASS INDEX 2D: 127.4 G/M2 (ref 49–115)
ECHO LV POSTERIOR WALL DIASTOLIC: 1 CM (ref 0.6–1)
ECHO LV RELATIVE WALL THICKNESS RATIO: 0.34
ECHO LVOT AREA: 3.8 CM2
ECHO LVOT AV VTI INDEX: 0.78
ECHO LVOT DIAM: 2.2 CM
ECHO LVOT MEAN GRADIENT: 1 MMHG
ECHO LVOT PEAK GRADIENT: 3 MMHG
ECHO LVOT PEAK VELOCITY: 0.8 M/S
ECHO LVOT STROKE VOLUME INDEX: 25.3 ML/M2
ECHO LVOT SV: 49.4 ML
ECHO LVOT VTI: 13 CM
ECHO MV A VELOCITY: 0.9 M/S
ECHO MV E DECELERATION TIME (DT): 165 MS
ECHO MV E VELOCITY: 1.76 M/S
ECHO MV E/A RATIO: 1.96
ECHO MV E/E' LATERAL: 17.6
ECHO MV E/E' RATIO (AVERAGED): 23.47
ECHO MV E/E' SEPTAL: 29.33
ECHO MV REGURGITANT PEAK GRADIENT: 88 MMHG
ECHO MV REGURGITANT PEAK VELOCITY: 4.7 M/S
ECHO MV REGURGITANT VTIA: 149 CM
ECHO PV ACCELERATION TIME (AT): 87 MS
ECHO PV MAX VELOCITY: 0.7 M/S
ECHO PV PEAK GRADIENT: 2 MMHG
ECHO RV BASAL DIMENSION: 5.4 CM
ECHO RV INTERNAL DIMENSION: 3.5 CM
ECHO RV LONGITUDINAL DIMENSION: 5.8 CM
ECHO RV MID DIMENSION: 3.5 CM
ECHO TV REGURGITANT MAX VELOCITY: 3.47 M/S
ECHO TV REGURGITANT PEAK GRADIENT: 48 MMHG
EOSINOPHIL # BLD: 0.2 K/UL (ref 0–0.8)
EOSINOPHIL NFR BLD: 4 % (ref 0.5–7.8)
ERYTHROCYTE [DISTWIDTH] IN BLOOD BY AUTOMATED COUNT: 19.6 % (ref 11.9–14.6)
ERYTHROCYTE [DISTWIDTH] IN BLOOD BY AUTOMATED COUNT: 19.8 % (ref 11.9–14.6)
GLOBULIN SER CALC-MCNC: 3.5 G/DL (ref 2.3–3.5)
GLUCOSE BLD STRIP.AUTO-MCNC: 132 MG/DL (ref 65–100)
GLUCOSE BLD STRIP.AUTO-MCNC: 138 MG/DL (ref 65–100)
GLUCOSE BLD STRIP.AUTO-MCNC: 153 MG/DL (ref 65–100)
GLUCOSE BLD STRIP.AUTO-MCNC: 272 MG/DL (ref 65–100)
GLUCOSE SERPL-MCNC: 119 MG/DL (ref 65–100)
GLUCOSE UR STRIP.AUTO-MCNC: NEGATIVE MG/DL
HCT VFR BLD AUTO: 23.4 % (ref 41.1–50.3)
HCT VFR BLD AUTO: 28.2 % (ref 41.1–50.3)
HCT VFR BLD AUTO: 30.1 % (ref 41.1–50.3)
HGB BLD-MCNC: 6.8 G/DL (ref 13.6–17.2)
HGB BLD-MCNC: 8.1 G/DL (ref 13.6–17.2)
HGB BLD-MCNC: 8.8 G/DL (ref 13.6–17.2)
HGB UR QL STRIP: NEGATIVE
HISTORY CHECKED?,CKHIST: NORMAL
IMM GRANULOCYTES # BLD AUTO: 0 K/UL (ref 0–0.5)
IMM GRANULOCYTES NFR BLD AUTO: 0 % (ref 0–5)
KETONES UR QL STRIP.AUTO: NEGATIVE MG/DL
LEUKOCYTE ESTERASE UR QL STRIP.AUTO: NEGATIVE
LYMPHOCYTES # BLD: 0.8 K/UL (ref 0.5–4.6)
LYMPHOCYTES NFR BLD: 20 % (ref 13–44)
MAGNESIUM SERPL-MCNC: 2.2 MG/DL (ref 1.8–2.4)
MCH RBC QN AUTO: 23.5 PG (ref 26.1–32.9)
MCH RBC QN AUTO: 24 PG (ref 26.1–32.9)
MCHC RBC AUTO-ENTMCNC: 29.1 G/DL (ref 31.4–35)
MCHC RBC AUTO-ENTMCNC: 29.2 G/DL (ref 31.4–35)
MCV RBC AUTO: 81 FL (ref 79.6–97.8)
MCV RBC AUTO: 82.2 FL (ref 79.6–97.8)
MONOCYTES # BLD: 0.4 K/UL (ref 0.1–1.3)
MONOCYTES NFR BLD: 10 % (ref 4–12)
NEUTS SEG # BLD: 2.7 K/UL (ref 1.7–8.2)
NEUTS SEG NFR BLD: 65 % (ref 43–78)
NITRITE UR QL STRIP.AUTO: NEGATIVE
NRBC # BLD: 0 K/UL (ref 0–0.2)
NRBC # BLD: 0 K/UL (ref 0–0.2)
OSMOLALITY UR: 334 MOSM/KG H2O (ref 50–1400)
PH UR STRIP: 6 [PH] (ref 5–9)
PHOSPHATE SERPL-MCNC: 3.2 MG/DL (ref 2.3–3.7)
PLATELET # BLD AUTO: 111 K/UL (ref 150–450)
PLATELET # BLD AUTO: 112 K/UL (ref 150–450)
PMV BLD AUTO: 10 FL (ref 9.4–12.3)
PMV BLD AUTO: 9.9 FL (ref 9.4–12.3)
POTASSIUM SERPL-SCNC: 3.8 MMOL/L (ref 3.5–5.1)
PROT SERPL-MCNC: 5.8 G/DL (ref 6.3–8.2)
PROT UR STRIP-MCNC: NEGATIVE MG/DL
PROT UR-MCNC: 6 MG/DL
PROT/CREAT UR-RTO: 0.4
RBC # BLD AUTO: 2.89 M/UL (ref 4.23–5.6)
RBC # BLD AUTO: 3.66 M/UL (ref 4.23–5.6)
SERVICE CMNT-IMP: ABNORMAL
SODIUM SERPL-SCNC: 142 MMOL/L (ref 136–145)
SODIUM UR-SCNC: 116 MMOL/L
SP GR UR REFRACTOMETRY: 1.01 (ref 1–1.02)
TROPONIN-HIGH SENSITIVITY: 11.6 PG/ML (ref 0–14)
UROBILINOGEN UR QL STRIP.AUTO: 0.2 EU/DL (ref 0.2–1)
WBC # BLD AUTO: 4.1 K/UL (ref 4.3–11.1)
WBC # BLD AUTO: 5.7 K/UL (ref 4.3–11.1)

## 2022-03-11 PROCEDURE — 85018 HEMOGLOBIN: CPT

## 2022-03-11 PROCEDURE — 2709999900 HC NON-CHARGEABLE SUPPLY

## 2022-03-11 PROCEDURE — 97165 OT EVAL LOW COMPLEX 30 MIN: CPT

## 2022-03-11 PROCEDURE — 84484 ASSAY OF TROPONIN QUANT: CPT

## 2022-03-11 PROCEDURE — 74011000250 HC RX REV CODE- 250: Performed by: REGISTERED NURSE

## 2022-03-11 PROCEDURE — 74011000250 HC RX REV CODE- 250: Performed by: STUDENT IN AN ORGANIZED HEALTH CARE EDUCATION/TRAINING PROGRAM

## 2022-03-11 PROCEDURE — 36430 TRANSFUSION BLD/BLD COMPNT: CPT

## 2022-03-11 PROCEDURE — 82436 ASSAY OF URINE CHLORIDE: CPT

## 2022-03-11 PROCEDURE — 80053 COMPREHEN METABOLIC PANEL: CPT

## 2022-03-11 PROCEDURE — 83880 ASSAY OF NATRIURETIC PEPTIDE: CPT

## 2022-03-11 PROCEDURE — 74011636637 HC RX REV CODE- 636/637: Performed by: STUDENT IN AN ORGANIZED HEALTH CARE EDUCATION/TRAINING PROGRAM

## 2022-03-11 PROCEDURE — C9113 INJ PANTOPRAZOLE SODIUM, VIA: HCPCS | Performed by: STUDENT IN AN ORGANIZED HEALTH CARE EDUCATION/TRAINING PROGRAM

## 2022-03-11 PROCEDURE — 76040000025: Performed by: INTERNAL MEDICINE

## 2022-03-11 PROCEDURE — 97161 PT EVAL LOW COMPLEX 20 MIN: CPT

## 2022-03-11 PROCEDURE — C8929 TTE W OR WO FOL WCON,DOPPLER: HCPCS

## 2022-03-11 PROCEDURE — 85027 COMPLETE CBC AUTOMATED: CPT

## 2022-03-11 PROCEDURE — 84100 ASSAY OF PHOSPHORUS: CPT

## 2022-03-11 PROCEDURE — 96376 TX/PRO/DX INJ SAME DRUG ADON: CPT

## 2022-03-11 PROCEDURE — 97535 SELF CARE MNGMENT TRAINING: CPT

## 2022-03-11 PROCEDURE — 81003 URINALYSIS AUTO W/O SCOPE: CPT

## 2022-03-11 PROCEDURE — 94664 DEMO&/EVAL PT USE INHALER: CPT

## 2022-03-11 PROCEDURE — 94640 AIRWAY INHALATION TREATMENT: CPT

## 2022-03-11 PROCEDURE — 82962 GLUCOSE BLOOD TEST: CPT

## 2022-03-11 PROCEDURE — 99213 OFFICE O/P EST LOW 20 MIN: CPT | Performed by: INTERNAL MEDICINE

## 2022-03-11 PROCEDURE — 76060000031 HC ANESTHESIA FIRST 0.5 HR: Performed by: INTERNAL MEDICINE

## 2022-03-11 PROCEDURE — P9016 RBC LEUKOCYTES REDUCED: HCPCS

## 2022-03-11 PROCEDURE — 84300 ASSAY OF URINE SODIUM: CPT

## 2022-03-11 PROCEDURE — 77010033678 HC OXYGEN DAILY

## 2022-03-11 PROCEDURE — 97530 THERAPEUTIC ACTIVITIES: CPT

## 2022-03-11 PROCEDURE — 84156 ASSAY OF PROTEIN URINE: CPT

## 2022-03-11 PROCEDURE — G0378 HOSPITAL OBSERVATION PER HR: HCPCS

## 2022-03-11 PROCEDURE — 85025 COMPLETE CBC W/AUTO DIFF WBC: CPT

## 2022-03-11 PROCEDURE — 2709999900 HC NON-CHARGEABLE SUPPLY: Performed by: INTERNAL MEDICINE

## 2022-03-11 PROCEDURE — 74011250637 HC RX REV CODE- 250/637: Performed by: STUDENT IN AN ORGANIZED HEALTH CARE EDUCATION/TRAINING PROGRAM

## 2022-03-11 PROCEDURE — 36415 COLL VENOUS BLD VENIPUNCTURE: CPT

## 2022-03-11 PROCEDURE — 74011250636 HC RX REV CODE- 250/636: Performed by: REGISTERED NURSE

## 2022-03-11 PROCEDURE — 74011250636 HC RX REV CODE- 250/636: Performed by: STUDENT IN AN ORGANIZED HEALTH CARE EDUCATION/TRAINING PROGRAM

## 2022-03-11 PROCEDURE — 83935 ASSAY OF URINE OSMOLALITY: CPT

## 2022-03-11 PROCEDURE — 94760 N-INVAS EAR/PLS OXIMETRY 1: CPT

## 2022-03-11 PROCEDURE — 83735 ASSAY OF MAGNESIUM: CPT

## 2022-03-11 PROCEDURE — 96375 TX/PRO/DX INJ NEW DRUG ADDON: CPT

## 2022-03-11 RX ORDER — POTASSIUM CHLORIDE 14.9 MG/ML
20 INJECTION INTRAVENOUS ONCE
Status: DISPENSED | OUTPATIENT
Start: 2022-03-11 | End: 2022-03-11

## 2022-03-11 RX ORDER — PROPOFOL 10 MG/ML
INJECTION, EMULSION INTRAVENOUS AS NEEDED
Status: DISCONTINUED | OUTPATIENT
Start: 2022-03-11 | End: 2022-03-11 | Stop reason: HOSPADM

## 2022-03-11 RX ORDER — FUROSEMIDE 10 MG/ML
20 INJECTION INTRAMUSCULAR; INTRAVENOUS DAILY
Status: DISCONTINUED | OUTPATIENT
Start: 2022-03-12 | End: 2022-03-11

## 2022-03-11 RX ORDER — SODIUM CHLORIDE, SODIUM LACTATE, POTASSIUM CHLORIDE, CALCIUM CHLORIDE 600; 310; 30; 20 MG/100ML; MG/100ML; MG/100ML; MG/100ML
INJECTION, SOLUTION INTRAVENOUS
Status: DISCONTINUED | OUTPATIENT
Start: 2022-03-11 | End: 2022-03-11 | Stop reason: HOSPADM

## 2022-03-11 RX ORDER — FUROSEMIDE 10 MG/ML
40 INJECTION INTRAMUSCULAR; INTRAVENOUS ONCE
Status: COMPLETED | OUTPATIENT
Start: 2022-03-11 | End: 2022-03-11

## 2022-03-11 RX ORDER — SODIUM CHLORIDE 9 MG/ML
250 INJECTION, SOLUTION INTRAVENOUS AS NEEDED
Status: DISCONTINUED | OUTPATIENT
Start: 2022-03-11 | End: 2022-03-12 | Stop reason: HOSPADM

## 2022-03-11 RX ORDER — POTASSIUM CHLORIDE 20 MEQ/1
20 TABLET, EXTENDED RELEASE ORAL
Status: DISCONTINUED | OUTPATIENT
Start: 2022-03-11 | End: 2022-03-11

## 2022-03-11 RX ADMIN — BUDESONIDE AND FORMOTEROL FUMARATE DIHYDRATE 2 PUFF: 80; 4.5 AEROSOL RESPIRATORY (INHALATION) at 08:23

## 2022-03-11 RX ADMIN — PROPOFOL 50 MG: 10 INJECTION, EMULSION INTRAVENOUS at 15:10

## 2022-03-11 RX ADMIN — FUROSEMIDE 40 MG: 10 INJECTION, SOLUTION INTRAMUSCULAR; INTRAVENOUS at 11:06

## 2022-03-11 RX ADMIN — Medication 10 MG: at 21:14

## 2022-03-11 RX ADMIN — SODIUM CHLORIDE, PRESERVATIVE FREE 10 ML: 5 INJECTION INTRAVENOUS at 05:07

## 2022-03-11 RX ADMIN — BUDESONIDE AND FORMOTEROL FUMARATE DIHYDRATE 2 PUFF: 80; 4.5 AEROSOL RESPIRATORY (INHALATION) at 19:54

## 2022-03-11 RX ADMIN — PROPOFOL 10 MG: 10 INJECTION, EMULSION INTRAVENOUS at 15:13

## 2022-03-11 RX ADMIN — SODIUM CHLORIDE, PRESERVATIVE FREE 40 MG: 5 INJECTION INTRAVENOUS at 09:47

## 2022-03-11 RX ADMIN — PHENYLEPHRINE HYDROCHLORIDE 100 MCG: 10 INJECTION INTRAVENOUS at 15:14

## 2022-03-11 RX ADMIN — GABAPENTIN 100 MG: 100 CAPSULE ORAL at 17:31

## 2022-03-11 RX ADMIN — SODIUM CHLORIDE, SODIUM LACTATE, POTASSIUM CHLORIDE, AND CALCIUM CHLORIDE: 600; 310; 30; 20 INJECTION, SOLUTION INTRAVENOUS at 15:00

## 2022-03-11 RX ADMIN — SODIUM CHLORIDE, PRESERVATIVE FREE 10 ML: 5 INJECTION INTRAVENOUS at 14:00

## 2022-03-11 RX ADMIN — PERFLUTREN 1 ML: 6.52 INJECTION, SUSPENSION INTRAVENOUS at 14:17

## 2022-03-11 RX ADMIN — PHENYLEPHRINE HYDROCHLORIDE 100 MCG: 10 INJECTION INTRAVENOUS at 15:17

## 2022-03-11 RX ADMIN — Medication 6 UNITS: at 22:00

## 2022-03-11 RX ADMIN — CARVEDILOL 12.5 MG: 6.25 TABLET, FILM COATED ORAL at 17:31

## 2022-03-11 RX ADMIN — PHENYLEPHRINE HYDROCHLORIDE 150 MCG: 10 INJECTION INTRAVENOUS at 15:12

## 2022-03-11 RX ADMIN — SODIUM CHLORIDE, PRESERVATIVE FREE 40 MG: 5 INJECTION INTRAVENOUS at 21:14

## 2022-03-11 NOTE — CONSULTS
Comprehensive Nutrition Assessment    Type and Reason for Visit: Initial,Consult  Unintentional weight loss (Hospitalist)    Nutrition Recommendations/Plan:   Meals and Snacks:  Await start of po diet post EGD. Suggest regular vs CCHO with 5 CHO/meal  Nutrition Supplement Therapy:   Medical food supplement therapy:  Initiate Ensure Enlive twice per day (this provides 350 kcal and 20 grams protein per bottle)     Malnutrition Assessment:  Malnutrition Status: At risk for malnutrition (specify) (Chronic low po intake, variable weights, NFPE deferred)    Nutrition Assessment:   Nutrition History: 3/11: Appetite has been \"not good\" for > 1 year with associated weight loss. He says he's never been very big with # and then went down to 140# and last weighed 132# at MD office last week. Says his weight has been picking up recently. He says this occurred over several years. Usually eats 1 meal, sometimes 2 meals per day. Breakfast ( doesn;t always eat breakfast) is 1.5 packets of grit, 2 hot dogs and slice of wheat bread, Lunch or dinner is >50% of ham sandwich or chili cheeseburger and fries from [employer] or stew beef and vegetable whch will last for at least 2 meals. He bougth a six pack of Ensure high protein recently and has consumed 3 bottles thus far but notes that it hasn't helped yet. Nutrition Background: H/O: GERD, CA, ischemic cardiomyopathy, CKD, COPD, CAD s/p CABG 9 years ago, DM, diabetic neuropathy, HLD, HTN, MR, PVD, active smoker, [place], and past surgical history of carotid endarterectomy. Sent by PCP d/t low hemoglobin. Admitted with ABLA and GI bleed, OSMEL  Nutrition Interval:  Pt sitting in bed with street clothes on, waiting on planned EGD today. He says he is a diabetic and not eating is not right. Pt OBV status with potential discharge planned for tomorrow depending on EGD results. RD ended visit when pt had to urinate.   Lab Results   Component Value Date/Time    Glucose 119 (H) 03/11/2022 04:12 AM    Glucose (POC) 153 (H) 03/11/2022 11:13 AM    Glucose (POC) 132 (H) 03/11/2022 06:37 AM    Glucose (POC) 193 (H) 03/10/2022 08:51 PM    Glucose (POC) 118 (H) 03/10/2022 05:19 PM    Glucose  06/20/2018 10:33 AM     HgA1C 6.7 (3/2)    Nutrition Related Findings:   NFPE deferred-pt fully clothed      Current Nutrition Therapies:  DIET NPO    Current Intake:   Average Meal Intake: NPO        Anthropometric Measures:  Height: 5' 9\" (175.3 cm)  Current Body Wt: 79.4 kg (175 lb) (3/10), Weight source: Stated  BMI: 25.8, Underweight (BMI less than 22) age over 71 ([facility] was c/w underweight status)  Admission Body Weight: 175 lb (stated)  Ideal Body Weight (lbs) (Calculated): 160 lbs (73 kg),    Usual Body Wt:  (See below),   WT / BMI WEIGHT   3/2/2022 135 lb   8/12/2021 133 lb 6.4 oz   7/16/2021 123 lb   3/31/2021 126 lb   1/5/2021 121 lb   12/30/2020 126 lb   10/6/2020 138 lb   9/25/2020 138 lb   9/9/2020 135 lb 3.2 oz   3/4/2020 137 lb 9.6 oz   11/22/2019 133 lb   11/15/2019 135 lb 12.8 oz   8/19/2019 131 lb 6.4 oz   8/12/2019 134 lb 3.2 oz   7/2/2019 130 lb 6.4 oz   6/5/2019 128 lb   5/16/2019 128 lb 12.8 oz   5/6/2019 134 lb 3.2 oz   2/25/2019 131 lb   2/21/2019 131 lb   2/4/2019 127 lb   12/17/2018 126 lb   11/2/2018 127 lb   10/1/2018 125 lb   7/9/2018 128 lb   6/20/2018 127 lb 8 oz   5/24/2018 129 lb 2 oz   12/14/2017 146 lb   10/31/2017 146 lb   10/16/2017 146 lb   9/13/2017 148 lb   6/27/2017 149 lb   6/13/2017 148 lb   11/9/2016 149 lb   Percent weight change:  Greatest portion of weigh loss occurred >5 years ago. Pt has had weight gai rather than loss over the past 1 year.            Edema: LLE: 2+; Pitting (3/11/2022  8:30 AM)  RLE: 2+; Pitting (3/11/2022  8:30 AM)     Estimated Daily Nutrient Needs:  Energy (kcal/day): 9494-7288 (Kcal/kg (25-30), Weight Used: Usual (61.4))  Protein (g/day): 77-92  ( (20% of kcal))  Fluid (ml/day):   (1 ml/kcal)    Nutrition Diagnosis: · Inadequate oral intake related to altered GI function as evidenced by NPO or clear liquid status due to medical condition    Nutrition Interventions:   Food and/or Nutrient Delivery: Continue NPO,Start oral nutrition supplement  Nutrition Education/Counseling: Education initiated. Instructed pt on need for higher kcal ONS then Ensure high protein ( only 160 kcal per bottle). Explained that intake would need to be consistently 1 to 2 bottles for 1 month or greater to see desired effect or weight gain. Suggested pt use Ensure complete or Ensure plus at least once a day as a meal replacement. Coordination of Nutrition Care: Continue to monitor while inpatient,Interdisciplinary rounds    Goals:       Active Goal: Diet progression ot at least full liquids within 3 days    Nutrition Monitoring and Evaluation:      Food/Nutrient Intake Outcomes: Food and nutrient intake,Diet advancement/tolerance,Supplement intake  Physical Signs/Symptoms Outcomes: Weight (Daily weight ordered)    Discharge Planning:    Continue oral nutrition supplement    Linda Span, [address] [place] [address], [facility] [place] [address], [address] [place] [address]

## 2022-03-11 NOTE — PROCEDURES
Esophagogastroduodenoscopy    DATE of PROCEDURE: 3/11/2022    MEDICATIONS ADMINISTERED: MAC    INSTRUMENT: GIF    PROCEDURE:  After obtaining informed consent, the patient was placed in the left lateral position and sedated. The endoscope was advanced under direct vision without difficulty. The esophagus, stomach (including retroflexed views) and duodenum were evaluated. The patient was taken to the recovery area in stable condition. FINDINGS:    OROPHARYNX: Cords and pyriform recesses normal.    ESOPHAGUS: The esophagus is normal. The proximal, mid and distal portions are normal. The Z-Line is unremarkable. STOMACH: The fundus on antegrade and retroflex views is normal. The body, antrum and pylorus are normal.      DUODENUM: The bulb and second portions are normal.     Estimated blood loss: 0-minimal     PLAN:  1. Follow up in AM  2.  No source of anemia or weight loss sen on this exam      Vladislav Middleton MD  [facility], [place]

## 2022-03-11 NOTE — PERIOP NOTES
TRANSFER - IN REPORT:    Verbal report received from [address] [place] [address] on [employer]  being received from 3rd floor for routine progression of care      Report consisted of patients Situation, Background, Assessment and   Recommendations(SBAR). Information from the following report(s) Kardex was reviewed with the receiving nurse. Opportunity for questions and clarification was provided. Assessment completed upon patients arrival to unit and care assumed.

## 2022-03-11 NOTE — DISCHARGE INSTR - DIET
Good nutrition is important when healing from an illness, injury, or surgery. Follow any nutrition recommendations given to you during your hospital stay. If you were given an oral nutrition supplement while in the hospital, continue to take this supplement at home. You can take it with meals, in-between meals, and/or before bedtime. These supplements can be purchased at most local grocery stores, pharmacies, and chain super-stores. If you have any questions about your diet or nutrition, call the hospital and ask for the dietitian. Discharge Nutrition Plan: Continue Oral Nutrition Supplement (ONS) at discharge. Recommend Ensure Complete or Ensure Plus or a comparable/similar product Once daily for 30 days unless otherwise directed by your Primary Care Physician. You can drink this as a meal since you do not eat more than 2 meals in a day. Drink this in addition to 2 meals each day.     Lencho Levy, [address], [address] [place] [address], [address] [place] [address]

## 2022-03-11 NOTE — PROGRESS NOTES
TRANSFER - OUT REPORT:    Verbal report given to Irene(name) on David Benjamin  being transferred to [facility](unit) for routine progression of care       Report consisted of patients Situation, Background, Assessment and   Recommendations(SBAR). Information from the following report(s) SBAR was reviewed with the receiving nurse. Lines:   Peripheral IV 03/10/22 Left Forearm (Active)   Site Assessment Clean, dry, & intact 03/11/22 0940   Phlebitis Assessment 0 03/11/22 0940   Infiltration Assessment 0 03/11/22 0940   Dressing Status Clean, dry, & intact 03/11/22 0940   Dressing Type Gauze;Tape;Transparent 03/11/22 0940   Hub Color/Line Status Pink;Flushed;Capped 03/11/22 0940   Action Taken Blood drawn 03/10/22 1315   Alcohol Cap Used Yes 03/10/22 2107        Opportunity for questions and clarification was provided.       Patient transported with:   Pinocular

## 2022-03-11 NOTE — ANESTHESIA POSTPROCEDURE EVALUATION
Procedure(s):  ESOPHAGOGASTRODUODENOSCOPY (EGD).     total IV anesthesia    Anesthesia Post Evaluation      Multimodal analgesia: multimodal analgesia used between 6 hours prior to anesthesia start to PACU discharge  Patient location during evaluation: bedside  Patient participation: complete - patient participated  Level of consciousness: awake and alert  Pain management: adequate  Airway patency: patent  Anesthetic complications: no  Cardiovascular status: acceptable  Respiratory status: acceptable  Hydration status: acceptable  Post anesthesia nausea and vomiting:  controlled  Final Post Anesthesia Temperature Assessment:  Normothermia (36.0-37.5 degrees C)      INITIAL Post-op Vital signs:   Vitals Value Taken Time   /69 03/11/22 1544   Temp 36.7 °C (98 °F) 03/11/22 1524   Pulse 91 03/11/22 1544   Resp 12 03/11/22 1544   SpO2 98 % 03/11/22 1544

## 2022-03-11 NOTE — H&P
CARDIOLOGY H&P                 Chief Complaint: Antiplatelet therapy management    Subjective:     Patient is a 67 y.o. male with a PMH of CAD status post CABG, status post mitral valve repair, hypertension, hyperlipidemia, ICM, chronic HFrEF, status post CEA, and PAD for whom we were consulted for antiplatelet therapy management in the setting of anemia. It is documented that the patient presented with unintentional weight loss, anemia, and shortness of breath. The patient has been on DAPT, including full dose aspirin. His fecal occult was positive, transferrin saturation was 3%, and GI was consulted. His hemoglobin is 6.8 today. The patient had an EGD today was \"no source of anemia or weight loss\" demonstrable on the study. He did have a CT scan that was suggestive of chronic pancreatitis. He had an echocardiogram today that was consistent with an EF of 25 to 95%, grade 2 diastolic dysfunction with increased LAP, moderate RV dysfunction, mild AI, moderate MR, a mean gradient of 5 mmHg at a heart rate of 89 bpm, and moderate TR. the patient denies having an MI or revascularization in the last 12 months. He also denies peripheral revascularization within the last 12 months though he does report a history of lower extremity stenting. The patient takes p.o. torsemide at home and has chronic dyspnea. He denies angina, and his troponin level was normal today.     Past Medical History:   Diagnosis Date    BPH (benign prostatic hyperplasia)     CHF (NYHA class III, ACC/AHA stage C) [facility]     Chronic kidney disease     CKD (chronic kidney disease)     COPD (chronic obstructive pulmonary disease) ([facility])     Coronary atherosclerosis of native coronary artery     Diabetes mellitus with circulatory complication ([facility])     DM neuropathies ([facility])     Dyslipidemia     GERD (gastroesophageal reflux disease)     Hyperlipidemia     Hypertension     Ischemic cardiomyopathy     Mitral regurgitation     PVD (peripheral vascular disease) ([facility] [address]       Past Surgical History:   Procedure Laterality Date    HX CAROTID ENDARTERECTOMY      HX CORONARY ARTERY BYPASS GRAFT  2005      Family History   Problem Relation Age of Onset    Diabetes Mother     Heart Attack Father     Diabetes Brother     Hypertension Brother       Social History     Tobacco Use    Smoking status: Current Every Day Smoker     Packs/day: 0.60     Years: 30.00     Pack years: 18.00    Smokeless tobacco: Never Used    Tobacco comment: almost a pack a day now, been smoking since 24years old   Substance Use Topics    Alcohol use: Yes     Alcohol/week: 1.0 standard drink     Types: 1 Cans of beer per week     Comment: a week      Allergies   Allergen Reactions    Omnipaque  [Iohexol] Hives    Iodine Rash    Unable To Obtain Unknown (comments)         Constitutional:   Negative unexplained weight loss. Eyes:   Negative for unexplained blindness. ENT:   Negative for unexplained hearing loss. Respiratory:   Negative for unexplained hemoptysis. Cardiovascular:   Negative except as noted in HPI. Gastrointestinal:   Negative for unexplained vomiting. Genitourinary:   Negative for unexplained hematuria. Integumentary:   Negative for unexplained rash. Hematologic/Lymphatic:   Negative for unexplained excessive bleeding. Musculoskeletal:  Negative for unexplained joint pain. Neurological:   Negative for stroke. Behavioral/Psych:   Negative for suicidal ideations. Endocrine:   Negative for uncontrolled diabetic symptoms including polyuria, polydipsia.        Objective:       Visit Vitals  /69   Pulse 91   Temp 98 °F (36.7 °C)   Resp 12   Ht 5' 9\" (1.753 m)   Wt 175 lb (79.4 kg)   SpO2 98%   BMI 25.84 kg/m²       03/11 0701 - 03/11 1900  In: 734.7 [I.V.:350]  Out: 1000 [Urine:1000]  03/09 1901 - 03/11 0700  In: 310   Out: 800 [Urine:800]    General/Constitutional:   Alert and oriented x 3, no acute distress  HEENT:   normocephalic, atraumatic, moist mucous membranes  Neck:   No JVD or carotid bruits bilaterally  Cardiovascular:   regular rate and rhythm, no rub/gallop appreciated  Pulmonary:   clear to auscultation bilaterally, no respiratory distress  Abdomen:   soft, non-tender, non-distended  Ext:   Trace LE edema bilaterally  Skin:  warm and dry, no obvious rashes seen  Neuro:   no obvious sensory or motor deficits  Psychiatric:   normal mood and affect     Data Review:   Recent Labs     03/11/22  0412 03/10/22  2128 03/10/22  1322 03/10/22  1322     --   --  139   K 3.8  --   --  3.6   MG 2.2  --   --   --    BUN 32*  --   --  31*   CREA 1.15  --   --  1.24   *  --   --  146*   WBC 4.1* 4.3   < > 4.0*   HGB 6.8* 7.0*   < > 7.9*   HCT 23.4* 25.2*   < > 27.8*   * 104*   < > 130*    < > = values in this interval not displayed. Assessment/Plan:     1. Chronic HFrEF  2. Ischemic cardiomyopathy  3. Hx of CABG  4. History of mitral valve repair  5. History of carotid endarterectomy  6. Acute on chronic anemia  7. PAD  8. Hypertension  9. Hyperlipidemia  10. Unintentional weight loss    The patient takes DAPT at home, including full dose aspirin. There is neither an indication for DAPT nor an indication for full potency aspirin at this time. His EGD today was negative for PUD. When acceptable to GI, recommend resuming SAPT with 81 mg daily of aspirin. The patient has chronic HFrEF and is on p.o. torsemide at home. An echocardiogram was completed today and was consistent with an EF of 25 to 00%, grade 2 diastolic dysfunction with increased LAP, moderate RV dysfunction, mild AI, moderate MR, a mean gradient of 5 mmHg at a heart rate of 89 bpm, and moderate TR. His estimated RAP is no more than 8 mmHg with a normal IVC size on echocardiography. Recommend resuming p.o. torsemide, Lipitor, Coreg and lisinopril.     Will defer to primary team/GI regarding unintentional weight loss and acute on chronic anemia. We will sign off. Thanks for allowing us to care for this patient. Please call with questions. He has an appointment with Dr. Glendy Pitt on March 30.     Chris Mike MD

## 2022-03-11 NOTE — INTERVAL H&P NOTE
Update History & Physical    The Patient's History and Physical of March 10,   2022 was reviewed with the patient and I examined the patient. There was no change. The surgical site was confirmed by the patient and me. Plan:  The risk, benefits, expected outcome, and alternative to the recommended procedure have been discussed with the patient. Patient understands and wants to proceed with the procedure.     Electronically signed by Santos Mckay MD on 3/11/2022 at 2:35 PM

## 2022-03-11 NOTE — PROGRESS NOTES
Saw pt in interdisciplinarily rounds with the following disciplines: Physician, Case Management, Nursing, Dietary,Therapy and Pharmacy. The plan of care was discussed along with goals for discharge date/ location. Per MD, to have EGD and if results are negative pt may d/c home Saturday. Per therapy pt may benefit form HH, list given.

## 2022-03-11 NOTE — PROGRESS NOTES
Problem: Mobility Impaired (Adult and Pediatric)  Goal: *Acute Goals and Plan of Care (Insert Text)  Outcome: Progressing Towards Goal  Note:     LTG:  (1.)Mr. Gretta Hobson will move from supine to sit and sit to supine  in bed with MODIFIED INDEPENDENCE within 7 treatment day(s). (2.)Mr. Gretta Hobson will transfer from bed to chair and chair to bed with MODIFIED INDEPENDENCE using the least restrictive device within 7 treatment day(s). (3.)Mr. Dixon will ambulate with SUPERVISION for 200 feet with the least restrictive device within 7 treatment day(s). (4.)Mr. Gretta Hobson will go up and down a full flight of stairs with one rail and cane with SBA within 7 days. ________________________________________________________________________________________________        PHYSICAL THERAPY: Initial Assessment and AM 3/11/2022  OBSERVATION: PT Visit Days : 1  Payor: Margaret Bartholomew OF [place] / [place] [employer] [place] / Product Type: Managed Care Medicare /       NAME/AGE/GENDER: Puma Thayer is a 67 y.o. male   PRIMARY DIAGNOSIS: GI bleed [K92.2] <principal problem not specified> <principal problem not specified>  Procedure(s) (LRB):  ESOPHAGOGASTRODUODENOSCOPY (EGD) (N/A)     ICD-10: Treatment Diagnosis:    · Generalized Muscle Weakness (M62.81)  · Difficulty in walking, Not elsewhere classified (R26.2)   Precaution/Allergies:  Omnipaque  [iohexol], Iodine, and Unable to obtain      ASSESSMENT:     Mr. Gretta Hobson presents with limited functional independence and endurance with this admission and is not at his baseline. He will benefit from PT while here and I recommend HHPT at discharge. He lives in an apartment and will need to be able to negotiate 15 steps. Supine on contact this morning and agreeable to therapy. He needed SBA for bed mobility and CGA for transfers and gait using his cane.  He stood and ambulated 15 ft to the chair, sat to recover, then ambulated 15 ft to the sink to work with the OT, then sat to recover and ambulated 15 ft back to the recliner where he was positioned for comfort. His activity is most limited by his chronic back pain, for which is normally takes hydrocodone twice a day. Notified RN so that she could get clearance from the MD for his hydrocodone to resume. This should help him with his mobility. This section established at most recent assessment   PROBLEM LIST (Impairments causing functional limitations):  1. Decreased Strength  2. Decreased Transfer Abilities  3. Decreased Ambulation Ability/Technique  4. Increased Pain  5. Decreased Activity Tolerance   INTERVENTIONS PLANNED: (Benefits and precautions of physical therapy have been discussed with the patient.)  1. Bed Mobility  2. Gait Training  3. Therapeutic Exercise/Strengthening  4. Transfer Training     TREATMENT PLAN: Frequency/Duration: daily for duration of hospital stay  Rehabilitation Potential For Stated Goals: Good     REHAB RECOMMENDATIONS (at time of discharge pending progress):    Placement: It is my opinion, based on this patient's performance to date, that Mr. Daysi Elizondo may benefit from [address] after discharge due to the functional deficits listed above that are likely to improve with skilled rehabilitation   Equipment:    None at this time              HISTORY:   History of Present Injury/Illness (Reason for Referral):  Naila Huffman is a 67 y.o. male, lives alone, ambulates with walker with medical history GERD, CHF, CKD, COPD, CAD s/p CABG 9 years ago, diabetes mellitus, diabetic neuropathy, hyperlipidemia, hypertension, MR, PVD, Ischemic cardiomyopathy, active smoker, hard of hearing  and past surgical history of carotid endarterectomy of who presented to ED with low hemoglobin. He was referred by PCP due to abnormal lab result with low hemoglobin of 7.6.   Patient complains of shortness of breath on exertion, weakness, fatigue, tiredness, weight loss of 25 pounds in 6 months, loss of appetite. He never had colonoscopy before. He is using aspirin Plavix since his CABG surgery. He is following with his cardiologist.  Patient denies dizziness, blood in the stools, melena, nausea, vomiting, chest pain, palpitations, NSAID use. ED course : Vitals stable. Labs remarkable for hemoglobin of 7.9/hematocrit 27.8, potassium 3.6, BUN 31/ Cr 1.24. FOBT positive. He was treated with pantoprazole. Admitted to hospitalist for evaluation of GI bleed. Past Medical History/Comorbidities:   Mr. Ariana Serrato  has a past medical history of BPH (benign prostatic hyperplasia), CHF (NYHA class III, ACC/AHA stage C) ([facility]), Chronic kidney disease, CKD (chronic kidney disease), COPD (chronic obstructive pulmonary disease) ([facility] Coronary atherosclerosis of native coronary artery, Diabetes mellitus with circulatory complication ([facility] [address] DM neuropathies ([facility] [address] Dyslipidemia, GERD (gastroesophageal reflux disease), Hyperlipidemia, Hypertension, Ischemic cardiomyopathy, Mitral regurgitation, and PVD (peripheral vascular disease) ([facility] [address] Mr. Ariana Serrato  has a past surgical history that includes hx carotid endarterectomy and hx coronary artery bypass graft (2005). Social History/Living Environment:   Home Environment: Apartment  # Steps to Enter: 15  One/Two Story Residence: One story  Living Alone: Yes  Support Systems: Friend/Neighbor  Patient Expects to be Discharged to[de-identified] Home  Current DME Used/Available at Home: Walker, rolling,Cane, straight  Prior Level of Function/Work/Activity:  Lives alone and still works as a  for [employer]; he uses a cane for ambulation outside of the home but nothing when ambulating in his home.  He lives in an apartment and must be able to negotiate 15 steps     Number of Personal Factors/Comorbidities that affect the Plan of Care: 0: LOW COMPLEXITY   EXAMINATION:   Most Recent Physical Functioning:   Gross Assessment:  AROM: Generally decreased, functional  Strength: Generally decreased, functional               Posture:     Balance:  Sitting: Intact  Standing: Pull to stand; With support Bed Mobility:  Rolling: Independent  Supine to Sit: Stand-by assistance  Sit to Supine: Stand-by assistance  Scooting: Stand-by assistance  Wheelchair Mobility:     Transfers:  Sit to Stand: Contact guard assistance  Stand to Sit: Stand-by assistance  Bed to Chair: Contact guard assistance  Gait:     Base of Support: Narrowed  Speed/Ingris: Shuffled  Gait Abnormalities: Decreased step clearance (forward flexed posture)  Distance (ft): 15 Feet (ft) (twice)  Assistive Device: Cane, straight  Ambulation - Level of Assistance: Contact guard assistance  Interventions: Verbal cues; Safety awareness training         Body Structures Involved:  1. Muscles Body Functions Affected:  1. Movement Related Activities and Participation Affected:  1. Mobility   Number of elements that affect the Plan of Care: 3: MODERATE COMPLEXITY   CLINICAL PRESENTATION:   Presentation: Stable and uncomplicated: LOW COMPLEXITY   CLINICAL DECISION MAKIN[address] [place] [id number] AM-PAC 6 Clicks   Basic Mobility Inpatient Short Form  How much difficulty does the patient currently have. .. Unable A Lot A Little None   1. Turning over in bed (including adjusting bedclothes, sheets and blankets)? [] 1   [] 2   [x] 3   [] 4   2. Sitting down on and standing up from a chair with arms ( e.g., wheelchair, bedside commode, etc.)   [] 1   [] 2   [x] 3   [] 4   3. Moving from lying on back to sitting on the side of the bed? [] 1   [] 2   [x] 3   [] 4   How much help from another person does the patient currently need. .. Total A Lot A Little None   4. Moving to and from a bed to a chair (including a wheelchair)? [] 1   [] 2   [x] 3   [] 4   5. Need to walk in hospital room? [] 1   [] 2   [x] 3   [] 4   6. Climbing 3-5 steps with a railing?    [] 1   [] 2   [x] 3   [] 4   © , Trustees of [address] [place] [id number], under license to [employer]. All rights reserved      Score:  Initial: 18 Most Recent: X (Date: -- )    Interpretation of Tool:  Represents activities that are increasingly more difficult (i.e. Bed mobility, Transfers, Gait). Medical Necessity:     · Patient is expected to demonstrate progress in   · strength, balance, and functional technique  ·  to   · increase independence with bed mobililty, transfers and gait  · .  Reason for Services/Other Comments:  · Patient continues to require skilled intervention due to   · Limited functional independence  · . Use of outcome tool(s) and clinical judgement create a POC that gives a: Clear prediction of patient's progress: LOW COMPLEXITY            TREATMENT:   (In addition to Assessment/Re-Assessment sessions the following treatments were rendered)   Pre-treatment Symptoms/Complaints:  back pain  Pain: Initial:      Post Session:  6     Therapeutic Activity: (    20 minutes): Therapeutic activities including Bed transfers, Chair transfers, Ambulation on level ground, and standing balance to improve mobility, strength, and balance. Required minimal Verbal cues; Safety awareness training to promote dynamic balance in standing. ASSESSMENT TODAY    Braces/Orthotics/Lines/Etc:   · O2 Device: Nasal cannula  Treatment/Session Assessment:    · Response to Treatment:  participated well and very pleasant but cannot tolerate standing or walking very far at all due to his back pain  · Interdisciplinary Collaboration:   o Physical Therapist  o Occupational Therapist  o Registered Nurse  · After treatment position/precautions:   o Up in chair  o Bed/Chair-wheels locked  o Bed in low position  o Call light within reach  o RN notified   · Compliance with Program/Exercises: Compliant all of the time  · Recommendations/Intent for next treatment session: \"Next visit will focus on advancements to more challenging activities and reduction in assistance provided\".   Total Treatment Duration:  PT Patient Time In/Time Out  Time In: 1040  Time Out: [address] [facility],

## 2022-03-11 NOTE — ANESTHESIA PREPROCEDURE EVALUATION
Relevant Problems   CARDIOVASCULAR   (+) CHF (NYHA class III, ACC/AHA stage C) (HCC)   (+) Coronary atherosclerosis of native coronary artery   (+) Essential hypertension   (+) Mitral regurgitation      GASTROINTESTINAL   (+) GERD (gastroesophageal reflux disease)      RENAL FAILURE   (+) OSMEL (acute kidney injury) (HCC)   (+) CKD (chronic kidney disease)      ENDOCRINE   (+) Diabetes mellitus with circulatory complication (HCC)   (+) Type 2 diabetes with nephropathy (HCC)      HEMATOLOGY   (+) Anemia   (+) Iron deficiency anemia due to dietary causes       Anesthetic History   No history of anesthetic complications            Review of Systems / Medical History  Patient summary reviewed and pertinent labs reviewed    Pulmonary    COPD: moderate      Smoker         Neuro/Psych   Within defined limits           Cardiovascular    Hypertension          CAD, PAD, cardiac stents, CABG and hyperlipidemia    Exercise tolerance: <4 METS  Comments: TTE 8/2021:  Severely reduced LV systolic function, estimated LVEF of 44-92%.    LV diastolic function indeterminate.    Pulmonary hypertension, estimated RVSP of 60-65 mm Hg.       GI/Hepatic/Renal     GERD: well controlled    Renal disease: CRI       Endo/Other    Diabetes: type 2    Anemia (s/p 1 unit prbc)     Other Findings   Comments: Denies any hematemesis/nausea           Physical Exam    Airway  Mallampati: II  TM Distance: 4 - 6 cm  Neck ROM: normal range of motion   Mouth opening: Normal     Cardiovascular               Dental    Dentition: Poor dentition     Pulmonary    Rhonchi:bilateral             Abdominal         Other Findings            Anesthetic Plan    ASA: 4, emergent  Anesthesia type: total IV anesthesia          Induction: Intravenous  Anesthetic plan and risks discussed with: Patient

## 2022-03-11 NOTE — PROGRESS NOTES
Hospitalist Progress Note   Admit Date:  3/10/2022  1:29 PM   Name:  Negro Valdez   Age:  67 y.o. Sex:  male  :  1945   MRN:  [id number]   Room:  Critical access hospital/    Presenting Complaint: Abnormal Lab Results    Reason(s) for Admission: GI bleed [facility] Course & Interval History:   Negro Valdez is a 67 y.o. male, lives alone, ambulates with walker with medical history GERD, CHF, CKD, COPD, CAD s/p CABG 9 years ago, diabetes mellitus, diabetic neuropathy, hyperlipidemia, hypertension, MR, PVD, Ischemic cardiomyopathy, active smoker, hard of hearing  and past surgical history of carotid endarterectomy of who presented to ED with low hemoglobin. He was referred by PCP due to abnormal lab result with low hemoglobin of 7.6. Patient complains of shortness of breath on exertion, weakness, fatigue, tiredness, weight loss of 25 pounds in 6 months, loss of appetite. He never had colonoscopy before. He is using aspirin Plavix since his CABG surgery. He is following with his cardiologist.  Patient denies dizziness, blood in the stools, melena, nausea, vomiting, chest pain, palpitations, NSAID use. ED course : Vitals stable. Labs remarkable for hemoglobin of 7.9/hematocrit 27.8, potassium 3.6, BUN 31/ Cr 1.24. FOBT positive. He was treated with pantoprazole. Admitted to hospitalist for evaluation of GI bleed    Subjective/24hr Events (22): Patient seen and examined at the bedside. Patient hemoglobin dropped overnight and 1 unit of PRBC was given. Patient complains of shortness of breath and also bilateral leg swellings. He is on oxygen saturating well. Patient denies chest pain, palpitations, nausea, vomiting, diarrhea, fever, chills    ROS:  10 systems reviewed and negative except as noted above.      Assessment & Plan:   Acute hypoxic respiratory failure  Most likely due to CHF  Patient is saturating well on 3 L of oxygen  S/p 1 dose of Lasix 40 mg IV after blood transfusion  CT chest negative for pleural effusions  Follow-up with BnP      Acute blood loss anemia and GI bleed   Possible peptic ulcer disease vs gastritis vs esophagitis vs malignancy  FOBT positive in ED  Continue   Protonix 40 mg IV twice daily  CT chest and abdomen and pelvis demonstrated  small right pleural effusion and minimal peripheral infiltrate in the right  upper lung and chronic pancreatitis. EGD normal   Monitor H&H q 8hrs  GI following, appreciate their recommendations     Acute blood loss anemia  Iron studies suggestive of SURINDER   Continue  ferrous sulfate p.o.     OSMEL on CKD  Most likely due to CHF   Creatinine is 1.15 and baseline is 1.1  Lisinopril is resumed  Allopurinol is on hold  Gabapentin dose is decreased from 400 mg twice daily to 100 mg twice daily  Avoid Nephrotoxic drugs     CAD s/p CABG   Holding home medication ASA and Plavix for now      CHF  Resumed home medication Coreg and Lasix  Follow-up with ECHO  Cardiologist is consulted     GERD  Continue with pantoprazole     Diabetes   A1c is 6.7 on 3/2  Home medication Glipizide is on hold.    Insulin sliding scale for now     BPH  Continue home medication Flomax     Active smoker  Counseled regarding smoking cessation  Continue  nicotine patch     Dispo/Discharge Planning: Home tomorrow if Hb is stable     Diet: Clear liquid diet  VTE ppx: SCDs  Code status: Full Code     Hospital Problems as of 3/11/2022 Date Reviewed: 4/22/2020          Codes Class Noted - Resolved POA    GI bleed ICD-10-CM: K92.2  ICD-9-CM: 578.9  3/10/2022 - Present Unknown              Objective:     Patient Vitals for the past 24 hrs:   Temp Pulse Resp BP SpO2   03/11/22 1524 98 °F (36.7 °C) 89 12 (!) 108/54 99 %   03/11/22 1109 98 °F (36.7 °C) 87 16 127/64 100 %   03/11/22 0940     92 %   03/11/22 0935 98.2 °F (36.8 °C) 87 16 129/71 (!) 86 %   03/11/22 0823     92 %   03/11/22 0651 98.3 °F (36.8 °C) 83 18 119/68    03/11/22 0551 97.8 °F (36.6 °C) 86 16 125/72    03/11/22 0526 98.3 °F (36.8 °C) 86 16 116/69    03/11/22 0304 98.2 °F (36.8 °C) 80 16 113/72 93 %   03/10/22 2300 98.5 °F (36.9 °C) 81 18 109/64 95 %   03/10/22 2119     95 %   03/10/22 1725 98.7 °F (37.1 °C) 97 18 (!) 143/76 95 %   03/10/22 1637  83   95 %     Oxygen Therapy  O2 Sat (%): 99 % (03/11/22 1524)  Pulse via Oximetry: 76 beats per minute (03/11/22 0823)  O2 Device: Oxygen mask (03/11/22 1524)  O2 Flow Rate (L/min): 2.5 l/min (03/11/22 0940)    Estimated body mass index is 25.84 kg/m² as calculated from the following:    Height as of this encounter: 5' 9\" (1.753 m). Weight as of this encounter: 79.4 kg (175 lb). Intake/Output Summary (Last 24 hours) at 3/11/2022 1531  Last data filed at 3/11/2022 1511  Gross per 24 hour   Intake 1044.7 ml   Output 1800 ml   Net -755.3 ml         Physical Exam:   Blood pressure (!) 108/54, pulse 89, temperature 98 °F (36.7 °C), resp. rate 12, height 5' 9\" (1.753 m), weight 79.4 kg (175 lb), SpO2 99 %. General:    Well nourished. No overt distress  Head:  Normocephalic, atraumatic, patient is on 2 L of nasal cannula  Eyes:  Sclerae appear normal.  Pupils equally round. ENT:  Nares appear normal, no drainage. Moist oral mucosa  Neck:  No restricted ROM. Trachea midline   CV:   RRR. No m/r/g. No jugular venous distension. Lungs:   CTAB. No wheezing, rhonchi, or rales. Respirations even, unlabored, bilateral crackles are present at the                                bases of the lungs  Abdomen: Bowel sounds present. Soft, nontender, nondistended. Extremities: No cyanosis or clubbing. Bilateral 2+ pitting edema is present  Skin:     No rashes and normal coloration. Warm and dry. Neuro:  CN II-XII grossly intact. Sensation intact. A&Ox3  Psych:  Normal mood and affect.       I have reviewed ordered lab tests and independently visualized imaging below:    Recent Labs:  Recent Results (from the past 48 hour(s))   CBC WITH AUTOMATED DIFF    Collection Time: 03/10/22  1:22 PM   Result Value Ref Range    WBC 4.0 (L) 4.3 - 11.1 K/uL    RBC 3.41 (L) 4.23 - 5.6 M/uL    HGB 7.9 (L) 13.6 - 17.2 g/dL    HCT 27.8 (L) 41.1 - 50.3 %    MCV 81.5 79.6 - 97.8 FL    MCH 23.2 (L) 26.1 - 32.9 PG    MCHC 28.4 (L) 31.4 - 35.0 g/dL    RDW 19.9 (H) 11.9 - 14.6 %    PLATELET 173 (L) 057 - 450 K/uL    MPV 10.2 9.4 - 12.3 FL    ABSOLUTE NRBC 0.00 0.0 - 0.2 K/uL    DF AUTOMATED      NEUTROPHILS 65 43 - 78 %    LYMPHOCYTES 20 13 - 44 %    MONOCYTES 10 4.0 - 12.0 %    EOSINOPHILS 5 0.5 - 7.8 %    BASOPHILS 1 0.0 - 2.0 %    IMMATURE GRANULOCYTES 0 0.0 - 5.0 %    ABS. NEUTROPHILS 2.6 1.7 - 8.2 K/UL    ABS. LYMPHOCYTES 0.8 0.5 - 4.6 K/UL    ABS. MONOCYTES 0.4 0.1 - 1.3 K/UL    ABS. EOSINOPHILS 0.2 0.0 - 0.8 K/UL    ABS. BASOPHILS 0.0 0.0 - 0.2 K/UL    ABS. IMM. GRANS. 0.0 0.0 - 0.5 K/UL   METABOLIC PANEL, COMPREHENSIVE    Collection Time: 03/10/22  1:22 PM   Result Value Ref Range    Sodium 139 136 - 145 mmol/L    Potassium 3.6 3.5 - 5.1 mmol/L    Chloride 109 (H) 98 - 107 mmol/L    CO2 30 21 - 32 mmol/L    Anion gap 0 (L) 7 - 16 mmol/L    Glucose 146 (H) 65 - 100 mg/dL    BUN 31 (H) 8 - 23 MG/DL    Creatinine 1.24 0.8 - 1.5 MG/DL    GFR est AA >60 >60 ml/min/1.73m2    GFR est non-AA >60 >60 ml/min/1.73m2    Calcium 8.6 8.3 - 10.4 MG/DL    Bilirubin, total 0.4 0.2 - 1.1 MG/DL    ALT (SGPT) 35 12 - 65 U/L    AST (SGOT) 39 (H) 15 - 37 U/L    Alk.  phosphatase 148 (H) 50 - 136 U/L    Protein, total 7.2 6.3 - 8.2 g/dL    Albumin 3.1 (L) 3.2 - 4.6 g/dL    Globulin 4.1 (H) 2.3 - 3.5 g/dL    A-G Ratio 0.8 (L) 1.2 - 3.5     TYPE & SCREEN    Collection Time: 03/10/22  1:22 PM   Result Value Ref Range    Crossmatch Expiration 03/13/2022,2359     ABO/Rh(D) O POSITIVE     Antibody screen NEG     Unit number [id number]     Blood component type [facility]     Unit division 00     Status of unit ISSUED     Crossmatch result Compatible    POC FECAL OCCULT BLOOD    Collection Time: 03/10/22 2:36 PM   Result Value Ref Range    Occult blood, stool (POC) Positive (A) NEG     GLUCOSE, POC    Collection Time: 03/10/22  5:19 PM   Result Value Ref Range    Glucose (POC) 118 (H) 65 - 100 mg/dL    Performed by [employer]    GLUCOSE, POC    Collection Time: 03/10/22  8:51 PM   Result Value Ref Range    Glucose (POC) 193 (H) 65 - 100 mg/dL    Performed by Jeanie    TRANSFERRIN SATURATION    Collection Time: 03/10/22  9:28 PM   Result Value Ref Range    Iron 10 (L) 35 - 150 ug/dL    TIBC 326 250 - 450 ug/dL    Transferrin Saturation 3 %   CBC WITH AUTOMATED DIFF    Collection Time: 03/10/22  9:28 PM   Result Value Ref Range    WBC 4.3 4.3 - 11.1 K/uL    RBC 3.06 (L) 4.23 - 5.6 M/uL    HGB 7.0 (L) 13.6 - 17.2 g/dL    HCT 25.2 (L) 41.1 - 50.3 %    MCV 82.4 79.6 - 97.8 FL    MCH 22.9 (L) 26.1 - 32.9 PG    MCHC 27.8 (L) 31.4 - 35.0 g/dL    RDW 19.9 (H) 11.9 - 14.6 %    PLATELET 254 (L) 942 - 450 K/uL    MPV 9.2 (L) 9.4 - 12.3 FL    ABSOLUTE NRBC 0.00 0.0 - 0.2 K/uL    DF AUTOMATED      NEUTROPHILS 63 43 - 78 %    LYMPHOCYTES 22 13 - 44 %    MONOCYTES 12 4.0 - 12.0 %    EOSINOPHILS 3 0.5 - 7.8 %    BASOPHILS 0 0.0 - 2.0 %    IMMATURE GRANULOCYTES 1 0.0 - 5.0 %    ABS. NEUTROPHILS 2.7 1.7 - 8.2 K/UL    ABS. LYMPHOCYTES 0.9 0.5 - 4.6 K/UL    ABS. MONOCYTES 0.5 0.1 - 1.3 K/UL    ABS. EOSINOPHILS 0.1 0.0 - 0.8 K/UL    ABS. BASOPHILS 0.0 0.0 - 0.2 K/UL    ABS. IMM.  GRANS. 0.0 0.0 - 0.5 K/UL   METABOLIC PANEL, COMPREHENSIVE    Collection Time: 03/11/22  4:12 AM   Result Value Ref Range    Sodium 142 136 - 145 mmol/L    Potassium 3.8 3.5 - 5.1 mmol/L    Chloride 112 (H) 98 - 107 mmol/L    CO2 27 21 - 32 mmol/L    Anion gap 3 (L) 7 - 16 mmol/L    Glucose 119 (H) 65 - 100 mg/dL    BUN 32 (H) 8 - 23 MG/DL    Creatinine 1.15 0.8 - 1.5 MG/DL    GFR est AA >60 >60 ml/min/1.73m2    GFR est non-AA >60 >60 ml/min/1.73m2    Calcium 8.1 (L) 8.3 - 10.4 MG/DL    Bilirubin, total 0.3 0.2 - 1.1 MG/DL    ALT (SGPT) 24 12 - 65 U/L    AST (SGOT) 21 15 - 37 U/L    Alk. phosphatase 114 50 - 136 U/L    Protein, total 5.8 (L) 6.3 - 8.2 g/dL    Albumin 2.3 (L) 3.2 - 4.6 g/dL    Globulin 3.5 2.3 - 3.5 g/dL    A-G Ratio 0.7 (L) 1.2 - 3.5     MAGNESIUM    Collection Time: 03/11/22  4:12 AM   Result Value Ref Range    Magnesium 2.2 1.8 - 2.4 mg/dL   PHOSPHORUS    Collection Time: 03/11/22  4:12 AM   Result Value Ref Range    Phosphorus 3.2 2.3 - 3.7 MG/DL   CBC WITH AUTOMATED DIFF    Collection Time: 03/11/22  4:12 AM   Result Value Ref Range    WBC 4.1 (L) 4.3 - 11.1 K/uL    RBC 2.89 (L) 4.23 - 5.6 M/uL    HGB 6.8 (LL) 13.6 - 17.2 g/dL    HCT 23.4 (L) 41.1 - 50.3 %    MCV 81.0 79.6 - 97.8 FL    MCH 23.5 (L) 26.1 - 32.9 PG    MCHC 29.1 (L) 31.4 - 35.0 g/dL    RDW 19.8 (H) 11.9 - 14.6 %    PLATELET 176 (L) 758 - 450 K/uL    MPV 9.9 9.4 - 12.3 FL    ABSOLUTE NRBC 0.00 0.0 - 0.2 K/uL    DF AUTOMATED      NEUTROPHILS 65 43 - 78 %    LYMPHOCYTES 20 13 - 44 %    MONOCYTES 10 4.0 - 12.0 %    EOSINOPHILS 4 0.5 - 7.8 %    BASOPHILS 1 0.0 - 2.0 %    IMMATURE GRANULOCYTES 0 0.0 - 5.0 %    ABS. NEUTROPHILS 2.7 1.7 - 8.2 K/UL    ABS. LYMPHOCYTES 0.8 0.5 - 4.6 K/UL    ABS. MONOCYTES 0.4 0.1 - 1.3 K/UL    ABS. EOSINOPHILS 0.2 0.0 - 0.8 K/UL    ABS. BASOPHILS 0.0 0.0 - 0.2 K/UL    ABS. IMM. GRANS. 0.0 0.0 - 0.5 K/UL   RBC, ALLOCATE    Collection Time: 03/11/22  4:45 AM   Result Value Ref Range    HISTORY CHECKED?  Historical check performed    GLUCOSE, POC    Collection Time: 03/11/22  6:37 AM   Result Value Ref Range    Glucose (POC) 132 (H) 65 - 100 mg/dL    Performed by Jeanie    GLUCOSE, POC    Collection Time: 03/11/22 11:13 AM   Result Value Ref Range    Glucose (POC) 153 (H) 65 - 100 mg/dL    Performed by Trudy    ECHO ADULT COMPLETE    Collection Time: 03/11/22  2:17 PM   Result Value Ref Range    LV EDV A2C 225 mL    LV EDV A4C 189 mL    LV ESV A2C 160 mL    LV ESV A4C 131 mL    IVSd 1.1 (A) 0.6 - 1.0 cm    LVIDd 5.8 4.2 - 5.9 cm LVIDs 5.0 cm    LVOT Diameter 2.2 cm    LVOT Mean Gradient 1 mmHg    LVOT VTI 13.0 cm    LVOT Peak Velocity 0.8 m/s    LVOT Peak Gradient 3 mmHg    LVPWd 1.0 0.6 - 1.0 cm    LV E' Lateral Velocity 10 cm/s    LV E' Septal Velocity 6 cm/s    LV Ejection Fraction A2C 29 %    LVOT Area 3.8 cm2    LVOT SV 49.4 ml    LA Minor Axis 5.8 cm    LA Major Axis 4.9 cm    LA Area 2C 20.6 cm2    LA Area 4C 19.7 cm2    LA Diameter 4.1 cm    AV Mean Gradient 2 mmHg    AV VTI 16.6 cm    AV Mean Velocity 0.6 m/s    AV Peak Velocity 0.9 m/s    AV Peak Gradient 3 mmHg    AV Area by VTI 3.0 cm2    AV Area by Peak Velocity 3.5 cm2    Ascending Aorta 3.3 cm    IVC Expiration 2.0 cm    MR .0 cm    MR Peak Velocity 4.7 m/s    MR Peak Gradient 88 mmHg    MV E Wave Deceleration Time 165.0 ms    MV A Velocity 0.90 m/s    MV E Velocity 1.76 m/s    PV AT 87.0 ms    PV Max Velocity 0.7 m/s    PV Peak Gradient 2 mmHg    RVIDd 3.5 cm    RV Basal Dimension 5.4 cm    RV Longitudinal Dimension 5.8 cm    RV Mid Dimension 3.5 cm    TR Max Velocity 3.47 m/s    TR Peak Gradient 48 mmHg    Fractional Shortening 2D 14 28 - 44 %    LV ESV Index A4C 67 mL/m2    LV EDV Index A4C 97 mL/m2    LV ESV Index A2C 82 mL/m2    LV EDV Index A2C 115 mL/m2    LVIDd Index 2.97 cm/m2    LVIDs Index 2.56 cm/m2    LV RWT Ratio 0.34     LV Mass 2D 248.5 (A) 88 - 224 g    LV Mass 2D Index 127.4 (A) 49 - 115 g/m2    MV E/A 1.96     E/E' Ratio (Averaged) 23.47     E/E' Lateral 17.60     E/E' Septal 29.33     LVOT Stroke Volume Index 25.3 mL/m2    LA Size Index 2.10 cm/m2    Ascending Aorta Index 1.69 cm/m2    AV Velocity Ratio 0.89     LVOT:AV VTI Index 0.78     KENDELL/BSA VTI 1.5 cm2/m2    KENDELL/BSA Peak Velocity 1.8 cm2/m2    Aortic Sinus Valsalva 4.0 cm    Aortic Sinus Valsalva Index 2.05 cm/m2       All Micro Results     None          Other Studies:  CT NECK CHEST ABD PELV WO CONT    Result Date: 3/10/2022  CT of the Neck, Chest, Abdomen, and Pelvis INDICATION: Anemia and shortness of breath, weight loss Multiple axial images were obtained through the neck, chest, abdomen, and pelvis. The study was performed without IV or oral contrast.  Radiation dose reduction techniques were used for this study. All CT scans performed at this facility use one or all of the following: Automated exposure control, adjustment of the mA and/or kVp according to patient's size, iterative reconstruction. COMPARISON: None FINDINGS: - CERVICAL NODES: No significant adenopathy. - NECK SOFT TISSUES: Normal. - SALIVARY GLANDS: No masses. - THYROID: No significant mass. - SINUSES: Clear. - CERVICAL SPINE: Extensive multilevel degenerative change. - LUNGS: There is a small right pleural effusion. Minimal peripheral patchy infiltrate in the right upper lobe. No definite pulmonary mass. - MEDIASTINUM/AXILLA: No significant adenopathy. - HEART/VESSELS: Post CABG. Extensive vascular calcification. Moderate cardiomegaly. - CHEST WALL: Sternotomy changes. No acute fractures or bone lesions. - LIVER: Normal in size and appearance. - GALLBLADDER/BILE DUCTS: No gallstones or bile duct dilatation. - PANCREAS: Extensive pancreatic calcification. - SPLEEN: Normal. - ADRENALS:  Normal. - KIDNEYS/URETERS: Extensive vascular calcification in the kidneys. No hydronephrosis or significant mass. - BLADDER: Distended. - REPRODUCTIVE ORGANS: No pelvic masses. - BOWEL: Normal caliber. No inflammatory changes. - LYMPH NODES: No significant retroperitoneal, mesenteric, or pelvic adenopathy. - BONES: Moderate degenerative change in the lumbar spine. No bone lesions or acute fractures. No fracture or significant bone lesion. - VASCULATURE: Marked vascular calcification. - OTHER: No ascites. 1.  Small right pleural effusion and minimal peripheral infiltrate in the right upper lung. 2.  Pancreas calcifications suggesting chronic pancreatitis. 3.  Extensive vascular disease.  If there are any questions about this report, I can be reached on [employer] or at [phone]    ECHO ADULT COMPLETE    Result Date: 3/11/2022    Left Ventricle: Left ventricle size is normal. Mildly increased wall thickness. Severe global hypokinesis present. Reduced left ventricular systolic function with a visually estimated EF of 25 - 30%. Grade II diastolic dysfunction with increased LAP.   Right Ventricle: Moderately reduced systolic function.   Aortic Valve: Mild transvalvular regurgitation.   Mitral Valve: Valve repaired by annular ring. Moderate transvalvular regurgitation. Mean gradient 5 mmHg at a heart rate of 89 bpm.   Tricuspid Valve: Moderate transvalvular regurgitation.        Current Meds:  Current Facility-Administered Medications   Medication Dose Route Frequency    0.9% sodium chloride infusion 250 mL  250 mL IntraVENous PRN    potassium chloride 20 mEq in 100 ml IVPB  20 mEq IntraVENous ONCE    sodium chloride (NS) flush 5-40 mL  5-40 mL IntraVENous Q8H    sodium chloride (NS) flush 5-40 mL  5-40 mL IntraVENous PRN    polyethylene glycol (MIRALAX) packet 17 g  17 g Oral DAILY PRN    ondansetron (ZOFRAN ODT) tablet 4 mg  4 mg Oral Q8H PRN    Or    ondansetron (ZOFRAN) injection 4 mg  4 mg IntraVENous Q6H PRN    atorvastatin (LIPITOR) tablet 40 mg  40 mg Oral DAILY    carvediloL (COREG) tablet 12.5 mg  12.5 mg Oral BID    [Held by provider] clopidogreL (PLAVIX) tablet 75 mg  75 mg Oral DAILY    [Held by provider] aspirin tablet 325 mg  325 mg Oral DAILY    [Held by provider] allopurinoL (ZYLOPRIM) tablet 300 mg  300 mg Oral DAILY    gabapentin (NEURONTIN) capsule 100 mg  100 mg Oral BID    [Held by provider] glipiZIDE (GLUCOTROL) tablet 5 mg  5 mg Oral DAILY    [Held by provider] lisinopriL (PRINIVIL, ZESTRIL) tablet 40 mg  40 mg Oral DAILY    tamsulosin (FLOMAX) capsule 0.4 mg  0.4 mg Oral DAILY    [Held by provider] torsemide (DEMADEX) tablet 20 mg  20 mg Oral DAILY    nicotine (NICODERM CQ) 14 mg/24 hr patch 1 Patch  1 Patch TransDERmal Q24H    ferrous sulfate tablet 325 mg  1 Tablet Oral DAILY WITH BREAKFAST    insulin lispro (HUMALOG) injection   SubCUTAneous AC&HS    pantoprazole (PROTONIX) 40 mg in 0.9% sodium chloride 10 mL injection  40 mg IntraVENous Q12H    melatonin tablet 10 mg  10 mg Oral QHS    budesonide-formoterol (SYMBICORT) 80-4.5 mcg inhaler  2 Puff Inhalation BID RT       Signed:  Reynaldo Arguelles MD    Part of this note may have been written by using a voice dictation software. The note has been proof read but may still contain some grammatical/other typographical errors.

## 2022-03-12 ENCOUNTER — APPOINTMENT (OUTPATIENT)
Dept: GENERAL RADIOLOGY | Age: 77
End: 2022-03-12
Attending: STUDENT IN AN ORGANIZED HEALTH CARE EDUCATION/TRAINING PROGRAM
Payer: MEDICARE

## 2022-03-12 VITALS
BODY MASS INDEX: 25.92 KG/M2 | OXYGEN SATURATION: 95 % | TEMPERATURE: 98.2 F | HEART RATE: 74 BPM | HEIGHT: 69 IN | SYSTOLIC BLOOD PRESSURE: 116 MMHG | DIASTOLIC BLOOD PRESSURE: 58 MMHG | RESPIRATION RATE: 16 BRPM | WEIGHT: 175.01 LBS

## 2022-03-12 LAB
ABO + RH BLD: NORMAL
ALBUMIN SERPL-MCNC: 2.2 G/DL (ref 3.2–4.6)
ALBUMIN/GLOB SERPL: 0.6 {RATIO} (ref 1.2–3.5)
ALP SERPL-CCNC: 110 U/L (ref 50–136)
ALT SERPL-CCNC: 21 U/L (ref 12–65)
ANION GAP SERPL CALC-SCNC: 3 MMOL/L (ref 7–16)
AST SERPL-CCNC: 17 U/L (ref 15–37)
BASOPHILS # BLD: 0 K/UL (ref 0–0.2)
BASOPHILS NFR BLD: 1 % (ref 0–2)
BILIRUB SERPL-MCNC: 0.5 MG/DL (ref 0.2–1.1)
BLD PROD TYP BPU: NORMAL
BLOOD GROUP ANTIBODIES SERPL: NORMAL
BPU ID: NORMAL
BUN SERPL-MCNC: 33 MG/DL (ref 8–23)
CALCIUM SERPL-MCNC: 8 MG/DL (ref 8.3–10.4)
CHLORIDE SERPL-SCNC: 113 MMOL/L (ref 98–107)
CO2 SERPL-SCNC: 27 MMOL/L (ref 21–32)
CREAT SERPL-MCNC: 1.33 MG/DL (ref 0.8–1.5)
CROSSMATCH RESULT,%XM: NORMAL
DIFFERENTIAL METHOD BLD: ABNORMAL
EOSINOPHIL # BLD: 0.2 K/UL (ref 0–0.8)
EOSINOPHIL NFR BLD: 4 % (ref 0.5–7.8)
ERYTHROCYTE [DISTWIDTH] IN BLOOD BY AUTOMATED COUNT: 19.5 % (ref 11.9–14.6)
GLOBULIN SER CALC-MCNC: 3.5 G/DL (ref 2.3–3.5)
GLUCOSE BLD STRIP.AUTO-MCNC: 138 MG/DL (ref 65–100)
GLUCOSE BLD STRIP.AUTO-MCNC: 161 MG/DL (ref 65–100)
GLUCOSE BLD STRIP.AUTO-MCNC: 191 MG/DL (ref 65–100)
GLUCOSE SERPL-MCNC: 74 MG/DL (ref 65–100)
HCT VFR BLD AUTO: 27.8 % (ref 41.1–50.3)
HGB BLD-MCNC: 8 G/DL (ref 13.6–17.2)
IMM GRANULOCYTES # BLD AUTO: 0 K/UL (ref 0–0.5)
IMM GRANULOCYTES NFR BLD AUTO: 0 % (ref 0–5)
LYMPHOCYTES # BLD: 0.8 K/UL (ref 0.5–4.6)
LYMPHOCYTES NFR BLD: 18 % (ref 13–44)
MAGNESIUM SERPL-MCNC: 2.1 MG/DL (ref 1.8–2.4)
MCH RBC QN AUTO: 23.7 PG (ref 26.1–32.9)
MCHC RBC AUTO-ENTMCNC: 28.8 G/DL (ref 31.4–35)
MCV RBC AUTO: 82.5 FL (ref 79.6–97.8)
MONOCYTES # BLD: 0.5 K/UL (ref 0.1–1.3)
MONOCYTES NFR BLD: 11 % (ref 4–12)
NEUTS SEG # BLD: 3.1 K/UL (ref 1.7–8.2)
NEUTS SEG NFR BLD: 66 % (ref 43–78)
NRBC # BLD: 0 K/UL (ref 0–0.2)
PHOSPHATE SERPL-MCNC: 3.3 MG/DL (ref 2.3–3.7)
PLATELET # BLD AUTO: 108 K/UL (ref 150–450)
PMV BLD AUTO: 9.7 FL (ref 9.4–12.3)
POTASSIUM SERPL-SCNC: 3.7 MMOL/L (ref 3.5–5.1)
PROT SERPL-MCNC: 5.7 G/DL (ref 6.3–8.2)
RBC # BLD AUTO: 3.37 M/UL (ref 4.23–5.6)
SERVICE CMNT-IMP: ABNORMAL
SODIUM SERPL-SCNC: 143 MMOL/L (ref 136–145)
SPECIMEN EXP DATE BLD: NORMAL
STATUS OF UNIT,%ST: NORMAL
UNIT DIVISION, %UDIV: 0
WBC # BLD AUTO: 4.6 K/UL (ref 4.3–11.1)

## 2022-03-12 PROCEDURE — C9113 INJ PANTOPRAZOLE SODIUM, VIA: HCPCS | Performed by: STUDENT IN AN ORGANIZED HEALTH CARE EDUCATION/TRAINING PROGRAM

## 2022-03-12 PROCEDURE — 74011250636 HC RX REV CODE- 250/636: Performed by: STUDENT IN AN ORGANIZED HEALTH CARE EDUCATION/TRAINING PROGRAM

## 2022-03-12 PROCEDURE — 74011000250 HC RX REV CODE- 250: Performed by: STUDENT IN AN ORGANIZED HEALTH CARE EDUCATION/TRAINING PROGRAM

## 2022-03-12 PROCEDURE — 74011250637 HC RX REV CODE- 250/637: Performed by: STUDENT IN AN ORGANIZED HEALTH CARE EDUCATION/TRAINING PROGRAM

## 2022-03-12 PROCEDURE — 36415 COLL VENOUS BLD VENIPUNCTURE: CPT

## 2022-03-12 PROCEDURE — 84100 ASSAY OF PHOSPHORUS: CPT

## 2022-03-12 PROCEDURE — 94640 AIRWAY INHALATION TREATMENT: CPT

## 2022-03-12 PROCEDURE — 97530 THERAPEUTIC ACTIVITIES: CPT

## 2022-03-12 PROCEDURE — G0378 HOSPITAL OBSERVATION PER HR: HCPCS

## 2022-03-12 PROCEDURE — 85025 COMPLETE CBC W/AUTO DIFF WBC: CPT

## 2022-03-12 PROCEDURE — 80053 COMPREHEN METABOLIC PANEL: CPT

## 2022-03-12 PROCEDURE — 97535 SELF CARE MNGMENT TRAINING: CPT

## 2022-03-12 PROCEDURE — 71045 X-RAY EXAM CHEST 1 VIEW: CPT

## 2022-03-12 PROCEDURE — 83735 ASSAY OF MAGNESIUM: CPT

## 2022-03-12 PROCEDURE — 96376 TX/PRO/DX INJ SAME DRUG ADON: CPT

## 2022-03-12 PROCEDURE — 82962 GLUCOSE BLOOD TEST: CPT

## 2022-03-12 PROCEDURE — 94760 N-INVAS EAR/PLS OXIMETRY 1: CPT

## 2022-03-12 RX ORDER — LANOLIN ALCOHOL/MO/W.PET/CERES
325 CREAM (GRAM) TOPICAL
Qty: 30 TABLET | Refills: 0 | Status: SHIPPED | OUTPATIENT
Start: 2022-03-13 | End: 2022-04-04 | Stop reason: SDUPTHER

## 2022-03-12 RX ORDER — GUAIFENESIN 100 MG/5ML
81 LIQUID (ML) ORAL DAILY
Status: DISCONTINUED | OUTPATIENT
Start: 2022-03-12 | End: 2022-03-12 | Stop reason: HOSPADM

## 2022-03-12 RX ORDER — POTASSIUM CHLORIDE 20 MEQ/1
40 TABLET, EXTENDED RELEASE ORAL
Status: COMPLETED | OUTPATIENT
Start: 2022-03-12 | End: 2022-03-12

## 2022-03-12 RX ORDER — GUAIFENESIN 100 MG/5ML
81 LIQUID (ML) ORAL DAILY
Qty: 30 TABLET | Refills: 0 | Status: SHIPPED | OUTPATIENT
Start: 2022-03-12 | End: 2022-04-04 | Stop reason: SDUPTHER

## 2022-03-12 RX ADMIN — SODIUM CHLORIDE, PRESERVATIVE FREE 10 ML: 5 INJECTION INTRAVENOUS at 06:00

## 2022-03-12 RX ADMIN — CARVEDILOL 12.5 MG: 6.25 TABLET, FILM COATED ORAL at 09:22

## 2022-03-12 RX ADMIN — TORSEMIDE 20 MG: 20 TABLET ORAL at 09:22

## 2022-03-12 RX ADMIN — LISINOPRIL 40 MG: 20 TABLET ORAL at 09:22

## 2022-03-12 RX ADMIN — TAMSULOSIN HYDROCHLORIDE 0.4 MG: 0.4 CAPSULE ORAL at 09:22

## 2022-03-12 RX ADMIN — ASPIRIN 81 MG: 81 TABLET, CHEWABLE ORAL at 12:30

## 2022-03-12 RX ADMIN — FERROUS SULFATE TAB 325 MG (65 MG ELEMENTAL FE) 325 MG: 325 (65 FE) TAB at 09:22

## 2022-03-12 RX ADMIN — ATORVASTATIN CALCIUM 40 MG: 40 TABLET, FILM COATED ORAL at 09:22

## 2022-03-12 RX ADMIN — GABAPENTIN 100 MG: 100 CAPSULE ORAL at 09:22

## 2022-03-12 RX ADMIN — POTASSIUM CHLORIDE 40 MEQ: 20 TABLET, EXTENDED RELEASE ORAL at 12:30

## 2022-03-12 RX ADMIN — BUDESONIDE AND FORMOTEROL FUMARATE DIHYDRATE 2 PUFF: 80; 4.5 AEROSOL RESPIRATORY (INHALATION) at 08:00

## 2022-03-12 RX ADMIN — SODIUM CHLORIDE, PRESERVATIVE FREE 40 MG: 5 INJECTION INTRAVENOUS at 09:21

## 2022-03-12 NOTE — PROGRESS NOTES
Problem: Self Care Deficits Care Plan (Adult)  Goal: *Acute Goals and Plan of Care (Insert Text)  Outcome: Progressing Towards Goal  Note: 1. Patient will perform grooming with supervision. 2. Patient will perform Upper body dressing with supervision  3. Patient will perform lower body dressing with supervision  4. Patient will perform upper and lower body bathing with supervision. 5. Patient will perform toilet transfers with supervision. 6. Patient will perform shower transfer with supervision. 7. Patient will participate in 30 + minutes of ADL/ therapeutic exercise/therapeutic activity with min rest breaks to increase activity tolerance for self care. 8. Patient will perform ADL functional mobility in room with supervision. Goals to be achieved in 7 days. OCCUPATIONAL THERAPY: Daily Note and AM 3/12/2022  OBSERVATION: OT Visit Days: 2  Payor: [facility] [place] MEDICARE / Plan: [place] [facility] / Product Type: Managed Care Medicare /      NAME/AGE/GENDER: Maria Eugenia Park is a 67 y.o. male   PRIMARY DIAGNOSIS:  GI bleed [K92.2] <principal problem not specified> <principal problem not specified>  Procedure(s) (LRB):  ESOPHAGOGASTRODUODENOSCOPY (EGD) (N/A)  1 Day Post-Op  ICD-10: Treatment Diagnosis:    · Generalized Muscle Weakness (M62.81)   Precautions/Allergies:     Omnipaque  [iohexol], Iodine, and Unable to obtain      ASSESSMENT:     Mr. Joe Moraes presents with above diagnosis and was seen in room with PT. Pt is noted to have generalized weakness, decreased activity tolerance and decreased balance. Pt lives alone and is not quite at his baseline of function and would benefit from OT while in the hospital to maximize safety and independence with self care. Pt up in room with oxygen in place and using cane. Pt to sink for grooming to including oral hygiene. Pt left up in recliner with all needs.      3/12/22 Pt was supine in bed upon arrival. Pt completed bed mobility with supervision. Pt completed functional mobility with supervision. Pt stood at sink and completed grooming with set up. Continue POC. This section established at most recent assessment   PROBLEM LIST (Impairments causing functional limitations):  1. Decreased Strength  2. Decreased ADL/Functional Activities  3. Decreased Balance  4. Decreased Activity Tolerance   INTERVENTIONS PLANNED: (Benefits and precautions of occupational therapy have been discussed with the patient.)  1. Activities of daily living training  2. Adaptive equipment training  3. Balance training  4. Therapeutic activity  5. Therapeutic exercise     TREATMENT PLAN: Frequency/Duration: Follow patient 3 times per week to address above goals. Rehabilitation Potential For Stated Goals: Good     REHAB RECOMMENDATIONS (at time of discharge pending progress):    Placement: It is my opinion, based on this patient's performance to date, that [facility] Leah[facility]  Equipment:    None at this time              OCCUPATIONAL PROFILE AND HISTORY:   History of Present Injury/Illness (Reason for Referral):  See H&P  Past Medical History/Comorbidities:   Mr. Joan Leahy  has a past medical history of BPH (benign prostatic hyperplasia), CHF (NYHA class III, ACC/AHA stage C) ([address] Chronic kidney disease, CKD (chronic kidney disease), COPD (chronic obstructive pulmonary disease) ([address] Coronary atherosclerosis of native coronary artery, Diabetes mellitus with circulatory complication ([address] DM neuropathies ([address] Dyslipidemia, GERD (gastroesophageal reflux disease), Hyperlipidemia, Hypertension, Ischemic cardiomyopathy, Mitral regurgitation, and PVD (peripheral vascular disease) ([address] Mr. Joan Leahy  has a past surgical history that includes hx carotid endarterectomy and hx coronary artery bypass graft (2005).   Social History/Living Environment:   Home Environment: Apartment  # Steps to Enter: 15  One/Two Story Residence: One story  Living Alone: Yes  Support Systems: Friend/Neighbor  Patient Expects to be Discharged to[de-identified] Home  Current DME Used/Available at Home: Walker, rolling,Cane, straight  Prior Level of Function/Work/Activity:  Independent with all activities of daily living to include driving. Number of Personal Factors/Comorbidities that affect the Plan of Care: Brief history (0):  LOW COMPLEXITY   ASSESSMENT OF OCCUPATIONAL PERFORMANCE[de-identified]   Activities of Daily Living:   Basic ADLs (From Assessment) Complex ADLs (From Assessment)   Feeding: Independent  Oral Facial Hygiene/Grooming: Stand-by assistance  Bathing: Minimum assistance  Upper Body Dressing: Stand-by assistance  Lower Body Dressing: Minimum assistance  Toileting: Minimum assistance     Grooming/Bathing/Dressing Activities of Daily Living   Grooming  Washing Face: Set-up  Brushing Teeth: Set-up Cognitive Retraining  Safety/Judgement: Fall prevention                       Bed/Mat Mobility  Supine to Sit: Supervision  Sit to Stand: Supervision  Bed to Chair: Supervision     Most Recent Physical Functioning:   Gross Assessment:                  Posture:     Balance:  Sitting: Intact  Standing: Intact Bed Mobility:  Supine to Sit: Supervision  Wheelchair Mobility:     Transfers:  Sit to Stand: Supervision  Bed to Chair: Supervision            Patient Vitals for the past 6 hrs:   BP SpO2 O2 Flow Rate (L/min) Pulse   03/12/22 0736 118/60 96 % 0 l/min 64       Mental Status  Neurologic State: Alert  Orientation Level: Oriented X4  Cognition: Appropriate decision making,Appropriate for age attention/concentration  Perception: Appears intact  Perseveration: No perseveration noted  Safety/Judgement: Fall prevention                          Physical Skills Involved:  1. Balance  2. Strength  3. Activity Tolerance Cognitive Skills Affected (resulting in the inability to perform in a timely and safe manner): 1. none Psychosocial Skills Affected:  1.  Environmental Adaptation   Number of elements that affect the Plan of Care: 3-5:  MODERATE COMPLEXITY   CLINICAL DECISION MAKIN[address] [place] [address] AM-PAC 6 Clicks   Daily Activity Inpatient Short Form  How much help from another person does the patient currently need. .. Total A Lot A Little None   1. Putting on and taking off regular lower body clothing? [] 1   [] 2   [x] 3   [] 4   2. Bathing (including washing, rinsing, drying)? [] 1   [] 2   [x] 3   [] 4   3. Toileting, which includes using toilet, bedpan or urinal?   [] 1   [] 2   [x] 3   [] 4   4. Putting on and taking off regular upper body clothing? [] 1   [] 2   [x] 3   [] 4   5. Taking care of personal grooming such as brushing teeth? [] 1   [] 2   [] 3   [x] 4   6. Eating meals? [] 1   [] 2   [] 3   [x] 4   © , Trustees of [address] [place] [address], under license to [employer]. All rights reserved      Score:  Initial: 20 Most Recent: X (Date: -- )    Interpretation of Tool:  Represents activities that are increasingly more difficult (i.e. Bed mobility, Transfers, Gait). Use of outcome tool(s) and clinical judgement create a POC that gives a: LOW COMPLEXITY         TREATMENT:   (In addition to Assessment/Re-Assessment sessions the following treatments were rendered)     Pre-treatment Symptoms/Complaints:  pt with complaint of back pain, RN notified that pt takes pain everyday at home for back pain. Pain: Initial:   Pain Intensity 1: 0  Post Session:  3     Self Care: (23): Procedure(s) (per grid) utilized to improve and/or restore self-care/home management as related to grooming. Required supervision to facilitate activities of daily living skills.     Braces/Orthotics/Lines/Etc:   · O2 Device: Nasal cannula  Treatment/Session Assessment:    · Response to Treatment:  pt up in room tolerated well  · Interdisciplinary Collaboration:   o Physical Therapist  o Certified Occupational Therapy Assistant  o Registered Nurse  · After treatment position/precautions:   o Up in chair  o Bed/Chair-wheels locked  o Call light within reach  o RN notified   · Compliance with Program/Exercises: Compliant all of the time, Will assess as treatment progresses. · Recommendations/Intent for next treatment session: \"Next visit will focus on advancements to more challenging activities and reduction in assistance provided\".   Total Treatment Duration:  OT Patient Time In/Time Out  Time In: 1130  Time Out: Arjun Gómez

## 2022-03-12 NOTE — PROGRESS NOTES
Problem: Mobility Impaired (Adult and Pediatric)  Goal: *Acute Goals and Plan of Care (Insert Text)  Outcome: Progressing Towards Goal  Note:     LTG:  (1.)Mr. Yasmani Mckeon will move from supine to sit and sit to supine  in bed with MODIFIED INDEPENDENCE within 7 treatment day(s). (2.)Mr. Yasmani Mckeon will transfer from bed to chair and chair to bed with MODIFIED INDEPENDENCE using the least restrictive device within 7 treatment day(s). (3.)Mr. Dixon will ambulate with SUPERVISION for 200 feet with the least restrictive device within 7 treatment day(s). Met 3/12  (4.)Mr. Yasmani Mckeon will go up and down a full flight of stairs with one rail and cane with SBA within 7 days. Met 3/12  ________________________________________________________________________________________________        PHYSICAL THERAPY: Daily Note and PM 3/12/2022  OBSERVATION: PT Visit Days : 2  Payor: [facility] / [place] [employer] [place] / Product Type: Managed Care Medicare /       NAME/AGE/GENDER: Cody Candelaria is a 67 y.o. male   PRIMARY DIAGNOSIS: GI bleed [K92.2] <principal problem not specified> <principal problem not specified>  Procedure(s) (LRB):  ESOPHAGOGASTRODUODENOSCOPY (EGD) (N/A)  1 Day Post-Op  ICD-10: Treatment Diagnosis:    · Generalized Muscle Weakness (M62.81)  · Difficulty in walking, Not elsewhere classified (R26.2)   Precaution/Allergies:  Omnipaque  [iohexol], Iodine, and Unable to obtain      ASSESSMENT:     Mr. Yasmani Mckeon showed stable gait on level ground using a cane but will need SBA on stairs & uneven ground. This pt will need follow up HHPT to hopefully improve pt's level of independence on stairs & uneven surfaces. PT advised pt to have his neighbor escort him up his stairs to get into his apartment. Also informed MSW that [facility] nursing needs to check on pt due to his struggling low endurance & COPD, in addition to [address]   PT also informed RN to have someone bring pt's car to the main entrance, on [address], due to pt too weak & unsteady on uneven ground to walk safely the distance to where his car is parked. PT recommended pt to obtain elastic shoe laces to decrease his time bent over trying to tie shoes & getting SOB. This section established at most recent assessment   PROBLEM LIST (Impairments causing functional limitations):  1. Decreased Strength  2. Decreased Transfer Abilities  3. Decreased Ambulation Ability/Technique  4. Increased Pain  5. Decreased Activity Tolerance   INTERVENTIONS PLANNED: (Benefits and precautions of physical therapy have been discussed with the patient.)  1. Bed Mobility  2. Gait Training  3. Therapeutic Exercise/Strengthening  4. Transfer Training     TREATMENT PLAN: Frequency/Duration: daily for duration of hospital stay  Rehabilitation Potential For Stated Goals: Good     REHAB RECOMMENDATIONS (at time of discharge pending progress):    Placement: It is my opinion, based on this patient's performance to date, that Mr. Gretta Hobson may benefit from [address] after discharge due to the functional deficits listed above that are likely to improve with skilled rehabilitation   Equipment:    None at this time              HISTORY:   History of Present Injury/Illness (Reason for Referral):  Puma Thayer is a 67 y.o. male, lives alone, ambulates with walker with medical history GERD, CHF, CKD, COPD, CAD s/p CABG 9 years ago, diabetes mellitus, diabetic neuropathy, hyperlipidemia, hypertension, MR, PVD, Ischemic cardiomyopathy, active smoker, hard of hearing  and past surgical history of carotid endarterectomy of who presented to ED with low hemoglobin. He was referred by PCP due to abnormal lab result with low hemoglobin of 7.6. Patient complains of shortness of breath on exertion, weakness, fatigue, tiredness, weight loss of 25 pounds in 6 months, loss of appetite. He never had colonoscopy before. He is using aspirin Plavix since his CABG surgery.   He is following with his cardiologist.  Patient denies dizziness, blood in the stools, melena, nausea, vomiting, chest pain, palpitations, NSAID use. ED course : Vitals stable. Labs remarkable for hemoglobin of 7.9/hematocrit 27.8, potassium 3.6, BUN 31/ Cr 1.24. FOBT positive. He was treated with pantoprazole. Admitted to hospitalist for evaluation of GI bleed. Past Medical History/Comorbidities:   Mr. Vaishnavi Canseco  has a past medical history of BPH (benign prostatic hyperplasia), CHF (NYHA class III, ACC/AHA stage C) ([facility]), Chronic kidney disease, CKD (chronic kidney disease), COPD (chronic obstructive pulmonary disease) ([facility] Coronary atherosclerosis of native coronary artery, Diabetes mellitus with circulatory complication ([facility] DM neuropathies ([facility] Dyslipidemia, GERD (gastroesophageal reflux disease), Hyperlipidemia, Hypertension, Ischemic cardiomyopathy, Mitral regurgitation, and PVD (peripheral vascular disease) ([facility] Mr. Vaishnavi Canseco  has a past surgical history that includes hx carotid endarterectomy and hx coronary artery bypass graft (2005). Social History/Living Environment:   Home Environment: Apartment  # Steps to Enter: 15  One/Two Story Residence: One story  Living Alone: Yes  Support Systems: Friend/Neighbor  Patient Expects to be Discharged to[de-identified] Home with home health  Current DME Used/Available at Home: Walker, rolling,Cane, straight  Prior Level of Function/Work/Activity:  Lives alone and still works as a  for [employer]; he uses a cane for ambulation outside of the home but nothing when ambulating in his home. He lives in an apartment and must be able to negotiate 15 steps     Number of Personal Factors/Comorbidities that affect the Plan of Care: 0: LOW COMPLEXITY   EXAMINATION:   Most Recent Physical Functioning:   Gross Assessment: 3 to 3-/5 throughout                     Balance:  Sitting: Intact; Without support  Standing: Intact; With support (cane) Bed Mobility:  Supine to Sit:  (NT)  Sit to Supine:  (NT)  Scooting: Supervision       Transfers:  Sit to Stand: Supervision  Stand to Sit: Supervision  Bed to Chair: Supervision  Duration: 42 Minutes (extra time to work thorugh activity noted)  Gait:     Speed/Ingris: Delayed  Gait Abnormalities: Decreased step clearance  Distance (ft): 200 Feet (ft)  Assistive Device: Cane, straight  Ambulation - Level of Assistance: Supervision  Number of Stairs Trained: 12 (x 2)  Stairs - Level of Assistance: Stand-by assistance  Rail Use: Left  (cane on right)  Interventions: Safety awareness training;Verbal cues         Body Structures Involved:  1. Muscles Body Functions Affected:  1. Movement Related Activities and Participation Affected:  1. Mobility   Number of elements that affect the Plan of Care: 3: MODERATE COMPLEXITY   CLINICAL PRESENTATION:   Presentation: Stable and uncomplicated: LOW COMPLEXITY   CLINICAL DECISION MAKIN[address] [place] [address] AM-PAC 6 Clicks   Basic Mobility Inpatient Short Form  How much difficulty does the patient currently have. .. Unable A Lot A Little None   1. Turning over in bed (including adjusting bedclothes, sheets and blankets)? [] 1   [] 2   [x] 3   [] 4   2. Sitting down on and standing up from a chair with arms ( e.g., wheelchair, bedside commode, etc.)   [] 1   [] 2   [x] 3   [] 4   3. Moving from lying on back to sitting on the side of the bed? [] 1   [] 2   [x] 3   [] 4   How much help from another person does the patient currently need. .. Total A Lot A Little None   4. Moving to and from a bed to a chair (including a wheelchair)? [] 1   [] 2   [x] 3   [] 4   5. Need to walk in hospital room? [] 1   [] 2   [x] 3   [] 4   6. Climbing 3-5 steps with a railing? [] 1   [] 2   [x] 3   [] 4   © , Trustees of [address] [place], [place] [id number], under license to [employer].  All rights reserved      Score:  Initial: 18 Most Recent: X (Date: -- )    Interpretation of Tool:  Represents activities that are increasingly more difficult (i.e. Bed mobility, Transfers, Gait). Medical Necessity:     · Patient is expected to demonstrate progress in   · strength, balance, and functional technique  ·  to   · increase independence with bed mobililty, transfers and gait  · .  Reason for Services/Other Comments:  · Patient continues to require skilled intervention due to   · Limited functional independence  · . Use of outcome tool(s) and clinical judgement create a POC that gives a: Clear prediction of patient's progress: LOW COMPLEXITY            TREATMENT:   (In addition to Assessment/Re-Assessment sessions the following treatments were rendered)   Pre-treatment Symptoms/Complaints:  \" pt agreeable to therapy this pm & HH services \"  Pain: Initial: numeric scale  Pain Intensity 1: 0  Post Session: 0/10             Braces/Orthotics/Lines/Etc:   · O2 Device: Nasal cannula  Treatment/Session Assessment:    · Response to Treatment:  Pt needed frequent breaks with all activity to recover, easily SOB with minimal exertion  · Interdisciplinary Collaboration:   o Registered Nurse  o   · After treatment position/precautions:   o Up in chair  o Bed/Chair-wheels locked  o Call light within reach  o RN notified   · Compliance with Program/Exercises: Compliant all of the time  · Recommendations/Intent for next treatment session: \"Next visit will focus on advancements to more challenging activities and reduction in assistance provided\".   Total Treatment Duration:  PT Patient Time In/Time Out  Time In: 1310  Time Out: 33 Jamele Satinder Solares, PT

## 2022-03-12 NOTE — PROGRESS NOTES
.  [facility] Progress Note             Date: 3/12/2022    GI Problem: ABLA    History of Present Illness:  Patient is a 67 y.o. male with PMH CAD and CABG who is seen in consultation for ABLA. Hgb was 6.8 and now up to 8.0. No overt bleeding. EGD was negative. No recent colonoscopy.       Hospital Medications:  Current Facility-Administered Medications   Medication Dose Route Frequency    potassium chloride (K-DUR, KLOR-CON M20) SR tablet 40 mEq  40 mEq Oral NOW    aspirin chewable tablet 81 mg  81 mg Oral DAILY    0.9% sodium chloride infusion 250 mL  250 mL IntraVENous PRN    sodium chloride (NS) flush 5-40 mL  5-40 mL IntraVENous Q8H    sodium chloride (NS) flush 5-40 mL  5-40 mL IntraVENous PRN    polyethylene glycol (MIRALAX) packet 17 g  17 g Oral DAILY PRN    ondansetron (ZOFRAN ODT) tablet 4 mg  4 mg Oral Q8H PRN    Or    ondansetron (ZOFRAN) injection 4 mg  4 mg IntraVENous Q6H PRN    atorvastatin (LIPITOR) tablet 40 mg  40 mg Oral DAILY    carvediloL (COREG) tablet 12.5 mg  12.5 mg Oral BID    [Held by provider] allopurinoL (ZYLOPRIM) tablet 300 mg  300 mg Oral DAILY    gabapentin (NEURONTIN) capsule 100 mg  100 mg Oral BID    [Held by provider] glipiZIDE (GLUCOTROL) tablet 5 mg  5 mg Oral DAILY    lisinopriL (PRINIVIL, ZESTRIL) tablet 40 mg  40 mg Oral DAILY    tamsulosin (FLOMAX) capsule 0.4 mg  0.4 mg Oral DAILY    torsemide (DEMADEX) tablet 20 mg  20 mg Oral DAILY    nicotine (NICODERM CQ) 14 mg/24 hr patch 1 Patch  1 Patch TransDERmal Q24H    ferrous sulfate tablet 325 mg  1 Tablet Oral DAILY WITH BREAKFAST    insulin lispro (HUMALOG) injection   SubCUTAneous AC&HS    melatonin tablet 10 mg  10 mg Oral QHS    budesonide-formoterol (SYMBICORT) 80-4.5 mcg inhaler  2 Puff Inhalation BID RT       Objective:     Physical Exam:  Vitals:  Visit Vitals  /60   Pulse 64   Temp 97.7 °F (36.5 °C)   Resp 18   Ht 5' 9\" (1.753 m)   Wt 79.4 kg (175 lb 0.2 oz)   SpO2 96% BMI 25.84 kg/m²       General: No acute distress. Skin:  Extremities and face reveal no rashes. No godwin erythema. No telangiectasias on the chest wall. HEENT: Sclerae anicteric. No oral ulcers. No abnormal pigmentation of the lips. The neck is supple. Cardiovascular: Regular rate and rhythm. No murmurs, gallops, or rubs. Respiratory:  Comfortable breathing  With no accessory muscle use. Clear breath sounds with no wheezes, rales, or rhonchi. GI:  Abdomen nondistended, soft, and nontender. Normal active bowel sounds. No enlargement of the liver or spleen. No masses palpable. Musculoskeletal:  No pitting edema of the lower legs. Extremities have good range of motion. Neurological:  Gross memory appears intact. Patient is alert and oriented. Psychiatric:  Mood appears appropriate with judgement intact. Lymphatic:  No cervical or supraclavicular adenopathy. Laboratory:    Recent Labs     03/12/22 0420   WBC 4.6   RBC 3.37*   HGB 8.0*   HCT 27.8*   *      Recent Labs     03/12/22 0420   GLU 74      K 3.7   *   CO2 27   BUN 33*   CREA 1.33   CA 8.0*     No results for input(s): PTP, INR, APTT, INREXT in the last 72 hours. Recent Labs     03/12/22 0420      ALB 2.2*   TP 5.7*       Assessment:       A 67 y.o. male with ABLA but normal EGD.       Plan:       Dc PPI  Will arrange colonoscopy as outpt  Will sign off for now      Signed By: Cece Enriquez MD     March 12, 2022

## 2022-03-12 NOTE — PROGRESS NOTES
I have reviewed discharge instructions with the patient. The patient verbalized understanding. Patient to follow up with GI.  Prescriptions sent to pharmacy by MD.

## 2022-03-12 NOTE — DISCHARGE SUMMARY
Hospitalist Discharge Summary   Admit Date:  3/10/2022  1:29 PM   DC Note date: 3/12/2022  Name:  Carlos Benitez   Age:  67 y.o. Sex:  male  :  1945   MRN:  [id number]   Room:  [facility]  PCP:  Kanchan Hansen NP    Presenting Complaint: Abnormal Lab Results    Initial Admission Diagnosis: GI bleed [K92.2]     Problem List for this Hospitalization:  Hospital Problems as of 3/12/2022 Date Reviewed: 2020          Codes Class Noted - Resolved POA    GI bleed ICD-10-CM: K92.2  ICD-9-CM: 578.9  3/10/2022 - Present Unknown            Did Patient have Sepsis (YES OR NO): no     Hospital Course:  Surinder nieves 67 y. o. male, lives alone, ambulates with walker with medical history GERD, CHF, CKD, COPD, CAD s/p CABG 9 years ago, diabetes mellitus, diabetic neuropathy, hyperlipidemia, hypertension, MR, PVD, Ischemic cardiomyopathy, active smoker, hard of hearing  and past surgical history of carotid endarterectomy of who presented to ED with low hemoglobin.  He was referred by PCP due to abnormal lab result with low hemoglobin of 7.6.  ED course : Vitals stable.  Labs remarkable for hemoglobin of 7.9/hematocrit 27.8, potassium 3.6, BUN 31/ Cr 1.24.  FOBT positive.  CT chest and abdomen and pelvis demonstrated  small right pleural effusion and minimal peripheral infiltrate in the right upper lung and chronic pancreatitis. He was treated with pantoprazole. Monitor his hemoglobin dropped to 6.8 and received 1 unit of blood. His hemoglobin remained stable around 8. Gastroenterologist was consulted and an EGD was performed which was normal.  Pantoprazole was discontinued. He had an echocardiogram that was consistent with an EF of 25 to 07%, grade 2 diastolic dysfunction with increased LAP, moderate RV dysfunction, mild AI, moderate MR, a mean gradient of 5 mmHg at a heart rate of 89 bpm, and moderate TR. Cardiologist was consulted and Plavix was started.   He was taking aspirin 325 mg which was discontinued and recommended a baby aspirin. He was advised to continue his home medications  p.o. torsemide, Lipitor, Coreg and lisinopril for his chronic heart failure. There was no bleeding during the hospitalization and patient is hemodynamically stable for discharge    Disposition: Home or Self Care  Diet: ADULT DIET Regular; Low Fat/Low Chol/High Fiber/2 gm Na; 1500 ml  Code Status: Full Code    Follow Up Orders: Follow-up Appointments   Procedures    FOLLOW UP VISIT Appointment in: 3 - 5 Days Follow up with PCP in 3-5 days Follow up with Cardiologist in 7-10 days Follow up with Gastroenterologist in 7-10 days     Follow up with PCP in 3-5 days  Follow up with Cardiologist in 7-10 days  Follow up with Gastroenterologist in 7-10 days     Standing Status:   Standing     Number of Occurrences:   1     Order Specific Question:   Appointment in     Answer:   3 - 5 Days       Follow-up Information     Follow up With Specialties Details Why Rock Taiwo NP Nurse Practitioner   [address]  [address] [address],[address] [address]  [phone]            Follow up labs/diagnostics (ultimately defer to outpatient provider): Follow-up with gastroenterologist in 7 to 10 days and needs colonoscopy as outpatient  Follow-up with cardiologist in 7 to 10 days  Follow-up with PCP in 3 to 5 days    Time spent in patient discharge and coordination 30 minutes. Plan was discussed with patient. All questions answered. Patient was stable at time of discharge. Instructions given to call a physician or return if any concerns. Discharge Info:   Current Discharge Medication List      START taking these medications    Details   aspirin 81 mg chewable tablet Take 1 Tablet by mouth daily for 30 days. Qty: 30 Tablet, Refills: 0  Start date: 3/12/2022, End date: 4/11/2022      ferrous sulfate 325 mg (65 mg iron) tablet Take 1 Tablet by mouth daily (with breakfast) for 30 days.   Qty: 30 Tablet, Refills: 0  Start date: 3/13/2022, End date: 4/12/2022         CONTINUE these medications which have NOT CHANGED    Details   allopurinoL (ZYLOPRIM) 300 mg tablet Take 1 Tablet by mouth daily. Qty: 90 Tablet, Refills: 1    Associated Diagnoses: Gouty arthritis      albuterol (Ventolin HFA) 90 mcg/actuation inhaler Take 2 Puffs by inhalation every four (4) hours as needed for Wheezing. Qty: 1 Each, Refills: 2    Associated Diagnoses: Cough      food supplemt, lactose-reduced (Ensure High Protein) liqd Take 237 mL by mouth four (4) times daily. Qty: 64 Each, Refills: 5    Associated Diagnoses: Poor appetite      glipiZIDE (GLUCOTROL) 5 mg tablet Take 1 Tablet by mouth daily. Qty: 90 Tablet, Refills: 0    Associated Diagnoses: Type 2 diabetes mellitus with other circulatory complication, without long-term current use of insulin (HCC)      budesonide-formoteroL (SYMBICORT) 80-4.5 mcg/actuation HFAA Take 2 Puffs by inhalation two (2) times a day. Qty: 10.2 g, Refills: 2    Associated Diagnoses: Cough; Mucopurulent chronic bronchitis (HCC)      lisinopriL (PRINIVIL, ZESTRIL) 40 mg tablet Take 1 Tablet by mouth daily. Qty: 90 Tablet, Refills: 1    Associated Diagnoses: CHF (NYHA class III, ACC/AHA stage C) ([facility] Ischemic cardiomyopathy      gabapentin (NEURONTIN) 400 mg capsule Take 400 mg by mouth. tamsulosin (FLOMAX) 0.4 mg capsule Take 0.4 mg by mouth daily. pantoprazole (PROTONIX) 40 mg tablet Take 40 mg by mouth. diclofenac (VOLTAREN) 1 % gel Apply  to affected area four (4) times daily. Qty: 1 Each, Refills: 1    Associated Diagnoses: Acute bilateral low back pain without sciatica      carvediloL (COREG) 25 mg tablet Take 1 Tab by mouth two (2) times a day. Qty: 180 Tab, Refills: 1    Associated Diagnoses: CHF (NYHA class III, ACC/AHA stage C) ([address] Ischemic cardiomyopathy      atorvastatin (LIPITOR) 40 mg tablet Take 1 Tab by mouth daily.   Qty: 90 Tab, Refills: 3    Associated Diagnoses: CHF (NYHA class III, ACC/AHA stage C) ([facility] Mixed hyperlipidemia; Hx of CABG; Ischemic cardiomyopathy      torsemide (DEMADEX) 20 mg tablet Take 1 Tab by mouth daily. Qty: 90 Tab, Refills: 1    Associated Diagnoses: CHF (NYHA class III, ACC/AHA stage C) ([facility] Ischemic cardiomyopathy      fluticasone propionate (FLONASE) 50 mcg/actuation nasal spray 2 Sprays by Both Nostrils route daily. Qty: 1 Bottle, Refills: 1    Associated Diagnoses: Non-seasonal allergic rhinitis due to other allergic trigger      melatonin 10 mg tab Take 1 tab PO at HS for insomnia. Qty: 30 Tab, Refills: 1    Associated Diagnoses: Insomnia due to medical condition      OneTouch Ultra Blue Test Strip strip USE TO TEST BLOOD GLUCOSE AS DIRECTED ONCE EVERY DAY  Qty: 100 Strip, Refills: 0      OTHER Dispense single pair of tedhose. Dx: dependent edema lower extremities  Qty: 1 Act, Refills: 0    Associated Diagnoses: Lower extremity edema      ONETOUCH ULTRA2 METER misc USE AS DIRECTED DAILY FOR TESTING BLOOD SUGAR  Qty: 1 Each, Refills: 0      Back Brace misc Use back brace as directed for 4-6 hours daily. DX: DDD  Qty: 1 Device, Refills: 0    Associated Diagnoses: DDD (degenerative disc disease), lumbar      !! ONE TOUCH DELICA 33 gauge misc     Associated Diagnoses: Diabetic polyneuropathy associated with type 2 diabetes mellitus ([facility]      ! ! lancets (ACCU-CHEK FASTCLIX LANCET DRUM) misc Once daily  Qty: 1 Each, Refills: 11      HYDROcodone-acetaminophen (NORCO) 7.5-325 mg per tablet TK 1 T PO TID PRN FOR 30 DAYS       ! ! - Potential duplicate medications found. Please discuss with provider.       STOP taking these medications       clopidogreL (PLAVIX) 75 mg tab Comments:   Reason for Stopping:         aspirin (ASPIRIN) 325 mg tablet Comments:   Reason for Stopping:               Procedures done this admission:  Procedure(s):  ESOPHAGOGASTRODUODENOSCOPY (EGD)    Consults this admission:  IP CONSULT TO GASTROENTEROLOGY  IP CONSULT TO CARDIOLOGY    Echocardiogram/EKG results:  Results from Hospital Encounter encounter on 03/10/22    ECHO ADULT COMPLETE    Interpretation Summary    Left Ventricle: Left ventricle size is normal. Mildly increased wall thickness. Severe global hypokinesis present. Reduced left ventricular systolic function with a visually estimated EF of 25 - 30%. Grade II diastolic dysfunction with increased LAP.   Right Ventricle: Moderately reduced systolic function.   Aortic Valve: Mild transvalvular regurgitation.   Mitral Valve: Valve repaired by annular ring. Moderate transvalvular regurgitation. Mean gradient 5 mmHg at a heart rate of 89 bpm.    Tricuspid Valve: Moderate transvalvular regurgitation. EKG Results     None          Diagnostic Imaging/Tests:   CT NECK CHEST ABD PELV WO CONT    Result Date: 3/10/2022  CT of the Neck, Chest, Abdomen, and Pelvis INDICATION: Anemia and shortness of breath, weight loss Multiple axial images were obtained through the neck, chest, abdomen, and pelvis. The study was performed without IV or oral contrast.  Radiation dose reduction techniques were used for this study. All CT scans performed at this facility use one or all of the following: Automated exposure control, adjustment of the mA and/or kVp according to patient's size, iterative reconstruction. COMPARISON: None FINDINGS: - CERVICAL NODES: No significant adenopathy. - NECK SOFT TISSUES: Normal. - SALIVARY GLANDS: No masses. - THYROID: No significant mass. - SINUSES: Clear. - CERVICAL SPINE: Extensive multilevel degenerative change. - LUNGS: There is a small right pleural effusion. Minimal peripheral patchy infiltrate in the right upper lobe. No definite pulmonary mass. - MEDIASTINUM/AXILLA: No significant adenopathy. - HEART/VESSELS: Post CABG. Extensive vascular calcification. Moderate cardiomegaly. - CHEST WALL: Sternotomy changes. No acute fractures or bone lesions. - LIVER: Normal in size and appearance.   - GALLBLADDER/BILE DUCTS: No gallstones or bile duct dilatation. - PANCREAS: Extensive pancreatic calcification. - SPLEEN: Normal. - ADRENALS:  Normal. - KIDNEYS/URETERS: Extensive vascular calcification in the kidneys. No hydronephrosis or significant mass. - BLADDER: Distended. - REPRODUCTIVE ORGANS: No pelvic masses. - BOWEL: Normal caliber. No inflammatory changes. - LYMPH NODES: No significant retroperitoneal, mesenteric, or pelvic adenopathy. - BONES: Moderate degenerative change in the lumbar spine. No bone lesions or acute fractures. No fracture or significant bone lesion. - VASCULATURE: Marked vascular calcification. - OTHER: No ascites. 1.  Small right pleural effusion and minimal peripheral infiltrate in the right upper lung. 2.  Pancreas calcifications suggesting chronic pancreatitis. 3.  Extensive vascular disease. If there are any questions about this report, I can be reached on "[employer] or at [phone]    ECHO ADULT COMPLETE    Result Date: 3/11/2022    Left Ventricle: Left ventricle size is normal. Mildly increased wall thickness. Severe global hypokinesis present. Reduced left ventricular systolic function with a visually estimated EF of 25 - 30%. Grade II diastolic dysfunction with increased LAP.   Right Ventricle: Moderately reduced systolic function.   Aortic Valve: Mild transvalvular regurgitation.   Mitral Valve: Valve repaired by annular ring. Moderate transvalvular regurgitation. Mean gradient 5 mmHg at a heart rate of 89 bpm.   Tricuspid Valve: Moderate transvalvular regurgitation.        All Micro Results     None          Labs: Results:       BMP, Mg, Phos Recent Labs     03/12/22  0420 03/11/22  0412 03/10/22  1322    142 139   K 3.7 3.8 3.6   * 112* 109*   CO2 27 27 30   AGAP 3* 3* 0*   BUN 33* 32* 31*   CREA 1.33 1.15 1.24   CA 8.0* 8.1* 8.6   GLU 74 119* 146*   MG 2.1 2.2  --    PHOS 3.3 3.2  --       CBC Recent Labs     03/12/22 0420 03/11/22  1948 03/11/22  1624 03/11/22  0412 03/11/22  0412 03/10/22  2128 03/10/22  2128   WBC 4.6  --  5.7  --  4.1*   < > 4.3   RBC 3.37*  --  3.66*  --  2.89*   < > 3.06*   HGB 8.0* 8.1* 8.8*   < > 6.8*   < > 7.0*   HCT 27.8* 28.2* 30.1*   < > 23.4*   < > 25.2*   *  --  112*  --  111*   < > 104*   GRANS 66  --   --   --  65  --  63   LYMPH 18  --   --   --  20  --  22   EOS 4  --   --   --  4  --  3   MONOS 11  --   --   --  10  --  12   BASOS 1  --   --   --  1  --  0   IG 0  --   --   --  0  --  1   ANEU 3.1  --   --   --  2.7  --  2.7   ABL 0.8  --   --   --  0.8  --  0.9   DHRUV 0.2  --   --   --  0.2  --  0.1   ABM 0.5  --   --   --  0.4  --  0.5   ABB 0.0  --   --   --  0.0  --  0.0   AIG 0.0  --   --   --  0.0  --  0.0    < > = values in this interval not displayed.       LFT Recent Labs     03/12/22  0420 03/11/22  0412 03/10/22  1322   ALT 21 24 35    114 148*   TP 5.7* 5.8* 7.2   ALB 2.2* 2.3* 3.1*   GLOB 3.5 3.5 4.1*   AGRAT 0.6* 0.7* 0.8*      Cardiac Testing Lab Results   Component Value Date/Time    B-type Natriuretic Peptide 2,101.8 (H) 08/04/2020 09:31 AM      Coagulation Tests Lab Results   Component Value Date/Time    Prothrombin time 11.5 08/12/2019 03:29 PM    INR 1.1 08/12/2019 03:29 PM      A1c Lab Results   Component Value Date/Time    Hemoglobin A1c 6.7 (H) 03/02/2022 11:15 AM    Hemoglobin A1c 6.0 (H) 08/24/2021 09:19 AM    Hemoglobin A1c 5.9 (H) 06/29/2021 03:15 PM    Hemoglobin A1c, External 6.3 10/12/2016 12:00 AM      Lipid Panel Lab Results   Component Value Date/Time    Cholesterol, total 112 06/29/2021 03:15 PM    HDL Cholesterol 41 06/29/2021 03:15 PM    LDL, calculated 52 06/29/2021 03:15 PM    LDL, calculated 52 08/04/2020 09:31 AM    VLDL, calculated 19 06/29/2021 03:15 PM    VLDL, calculated 13 08/04/2020 09:31 AM    Triglyceride 100 06/29/2021 03:15 PM      Thyroid Panel Lab Results   Component Value Date/Time    TSH 2.150 02/26/2020 09:40 AM    TSH 2.200 05/09/2019 09:54 AM T4, Free 1.07 05/09/2019 09:54 AM        Most Recent UA Lab Results   Component Value Date/Time    Color YELLOW 03/11/2022 05:35 PM    Appearance CLEAR 03/11/2022 05:35 PM    Specific gravity 1.008 03/11/2022 05:35 PM    pH (UA) 6.0 03/11/2022 05:35 PM    Protein Negative 03/11/2022 05:35 PM    Glucose Negative 03/11/2022 05:35 PM    Ketone Negative 03/11/2022 05:35 PM    Bilirubin Negative 03/11/2022 05:35 PM    Blood Negative 03/11/2022 05:35 PM    Urobilinogen 0.2 03/11/2022 05:35 PM    Nitrites Negative 03/11/2022 05:35 PM    Leukocyte Esterase Negative 03/11/2022 05:35 PM    WBC None seen 09/14/2021 10:27 AM    RBC 0-2 09/14/2021 10:27 AM    Epithelial cells 0-10 09/14/2021 10:27 AM    Bacteria None seen 09/14/2021 10:27 AM    Mucus Present 08/04/2020 09:31 AM          All Labs from Last 24 Hrs:  Recent Results (from the past 24 hour(s))   NT-PRO BNP    Collection Time: 03/11/22  4:23 PM   Result Value Ref Range    NT pro-BNP 16,623 (H) <450 PG/ML   CBC W/O DIFF    Collection Time: 03/11/22  4:24 PM   Result Value Ref Range    WBC 5.7 4.3 - 11.1 K/uL    RBC 3.66 (L) 4.23 - 5.6 M/uL    HGB 8.8 (L) 13.6 - 17.2 g/dL    HCT 30.1 (L) 41.1 - 50.3 %    MCV 82.2 79.6 - 97.8 FL    MCH 24.0 (L) 26.1 - 32.9 PG    MCHC 29.2 (L) 31.4 - 35.0 g/dL    RDW 19.6 (H) 11.9 - 14.6 %    PLATELET 082 (L) 253 - 450 K/uL    MPV 10.0 9.4 - 12.3 FL    ABSOLUTE NRBC 0.00 0.0 - 0.2 K/uL   GLUCOSE, POC    Collection Time: 03/11/22  5:02 PM   Result Value Ref Range    Glucose (POC) 138 (H) 65 - 100 mg/dL    Performed by Michelle    CHLORIDE, URINE RANDOM    Collection Time: 03/11/22  5:35 PM   Result Value Ref Range    Chloride,urine random 125 MMOL/L   OSMOLALITY, UR    Collection Time: 03/11/22  5:35 PM   Result Value Ref Range    Osmolality,urine 334 50 - 1,400 MOSM/kg H2O   PROTEIN/CREATININE RATIO, URINE    Collection Time: 03/11/22  5:35 PM   Result Value Ref Range    Protein, urine random 6 mg/dL    Creatinine, urine 16.00 mg/dL Protein/Creat. urine Ratio 0.4     SODIUM, UR, RANDOM    Collection Time: 03/11/22  5:35 PM   Result Value Ref Range    Sodium,urine random 116 MMOL/L   URINALYSIS W/ RFLX MICROSCOPIC    Collection Time: 03/11/22  5:35 PM   Result Value Ref Range    Color YELLOW      Appearance CLEAR      Specific gravity 1.008 1.001 - 1.023      pH (UA) 6.0 5.0 - 9.0      Protein Negative NEG mg/dL    Glucose Negative mg/dL    Ketone Negative NEG mg/dL    Bilirubin Negative NEG      Blood Negative NEG      Urobilinogen 0.2 0.2 - 1.0 EU/dL    Nitrites Negative NEG      Leukocyte Esterase Negative NEG     HGB & HCT    Collection Time: 03/11/22  7:48 PM   Result Value Ref Range    HGB 8.1 (L) 13.6 - 17.2 g/dL    HCT 28.2 (L) 41.1 - 50.3 %   GLUCOSE, POC    Collection Time: 03/11/22  9:30 PM   Result Value Ref Range    Glucose (POC) 272 (H) 65 - 100 mg/dL    Performed by [facility]    METABOLIC PANEL, COMPREHENSIVE    Collection Time: 03/12/22  4:20 AM   Result Value Ref Range    Sodium 143 136 - 145 mmol/L    Potassium 3.7 3.5 - 5.1 mmol/L    Chloride 113 (H) 98 - 107 mmol/L    CO2 27 21 - 32 mmol/L    Anion gap 3 (L) 7 - 16 mmol/L    Glucose 74 65 - 100 mg/dL    BUN 33 (H) 8 - 23 MG/DL    Creatinine 1.33 0.8 - 1.5 MG/DL    GFR est AA >60 >60 ml/min/1.73m2    GFR est non-AA 55 (L) >60 ml/min/1.73m2    Calcium 8.0 (L) 8.3 - 10.4 MG/DL    Bilirubin, total 0.5 0.2 - 1.1 MG/DL    ALT (SGPT) 21 12 - 65 U/L    AST (SGOT) 17 15 - 37 U/L    Alk.  phosphatase 110 50 - 136 U/L    Protein, total 5.7 (L) 6.3 - 8.2 g/dL    Albumin 2.2 (L) 3.2 - 4.6 g/dL    Globulin 3.5 2.3 - 3.5 g/dL    A-G Ratio 0.6 (L) 1.2 - 3.5     MAGNESIUM    Collection Time: 03/12/22  4:20 AM   Result Value Ref Range    Magnesium 2.1 1.8 - 2.4 mg/dL   PHOSPHORUS    Collection Time: 03/12/22  4:20 AM   Result Value Ref Range    Phosphorus 3.3 2.3 - 3.7 MG/DL   CBC WITH AUTOMATED DIFF    Collection Time: 03/12/22  4:20 AM   Result Value Ref Range    WBC 4.6 4.3 - 11.1 K/uL    RBC 3.37 (L) 4.23 - 5.6 M/uL    HGB 8.0 (L) 13.6 - 17.2 g/dL    HCT 27.8 (L) 41.1 - 50.3 %    MCV 82.5 79.6 - 97.8 FL    MCH 23.7 (L) 26.1 - 32.9 PG    MCHC 28.8 (L) 31.4 - 35.0 g/dL    RDW 19.5 (H) 11.9 - 14.6 %    PLATELET 004 (L) 596 - 450 K/uL    MPV 9.7 9.4 - 12.3 FL    ABSOLUTE NRBC 0.00 0.0 - 0.2 K/uL    DF AUTOMATED      NEUTROPHILS 66 43 - 78 %    LYMPHOCYTES 18 13 - 44 %    MONOCYTES 11 4.0 - 12.0 %    EOSINOPHILS 4 0.5 - 7.8 %    BASOPHILS 1 0.0 - 2.0 %    IMMATURE GRANULOCYTES 0 0.0 - 5.0 %    ABS. NEUTROPHILS 3.1 1.7 - 8.2 K/UL    ABS. LYMPHOCYTES 0.8 0.5 - 4.6 K/UL    ABS. MONOCYTES 0.5 0.1 - 1.3 K/UL    ABS. EOSINOPHILS 0.2 0.0 - 0.8 K/UL    ABS. BASOPHILS 0.0 0.0 - 0.2 K/UL    ABS. IMM.  GRANS. 0.0 0.0 - 0.5 K/UL   GLUCOSE, POC    Collection Time: 03/12/22  6:28 AM   Result Value Ref Range    Glucose (POC) 161 (H) 65 - 100 mg/dL    Performed by Jeanie    GLUCOSE, POC    Collection Time: 03/12/22  7:31 AM   Result Value Ref Range    Glucose (POC) 138 (H) 65 - 100 mg/dL    Performed by Janusz    GLUCOSE, POC    Collection Time: 03/12/22 11:37 AM   Result Value Ref Range    Glucose (POC) 191 (H) 65 - 100 mg/dL    Performed by Bill Paiz in Hospital:   Current Facility-Administered Medications   Medication Dose Route Frequency    aspirin chewable tablet 81 mg  81 mg Oral DAILY    0.9% sodium chloride infusion 250 mL  250 mL IntraVENous PRN    sodium chloride (NS) flush 5-40 mL  5-40 mL IntraVENous Q8H    sodium chloride (NS) flush 5-40 mL  5-40 mL IntraVENous PRN    polyethylene glycol (MIRALAX) packet 17 g  17 g Oral DAILY PRN    ondansetron (ZOFRAN ODT) tablet 4 mg  4 mg Oral Q8H PRN    Or    ondansetron (ZOFRAN) injection 4 mg  4 mg IntraVENous Q6H PRN    atorvastatin (LIPITOR) tablet 40 mg  40 mg Oral DAILY    carvediloL (COREG) tablet 12.5 mg  12.5 mg Oral BID    [Held by provider] allopurinoL (ZYLOPRIM) tablet 300 mg  300 mg Oral DAILY    gabapentin (NEURONTIN) capsule 100 mg  100 mg Oral BID    [Held by provider] glipiZIDE (GLUCOTROL) tablet 5 mg  5 mg Oral DAILY    lisinopriL (PRINIVIL, ZESTRIL) tablet 40 mg  40 mg Oral DAILY    tamsulosin (FLOMAX) capsule 0.4 mg  0.4 mg Oral DAILY    torsemide (DEMADEX) tablet 20 mg  20 mg Oral DAILY    nicotine (NICODERM CQ) 14 mg/24 hr patch 1 Patch  1 Patch TransDERmal Q24H    ferrous sulfate tablet 325 mg  1 Tablet Oral DAILY WITH BREAKFAST    insulin lispro (HUMALOG) injection   SubCUTAneous AC&HS    melatonin tablet 10 mg  10 mg Oral QHS    budesonide-formoterol (SYMBICORT) 80-4.5 mcg inhaler  2 Puff Inhalation BID RT       Allergies   Allergen Reactions    Omnipaque  [Iohexol] Hives    Iodine Rash    Unable To Obtain Unknown (comments)     Immunization History   Administered Date(s) Administered    COVID-19, J&J, PF, 0.5 mL Dose 07/19/2021    Influenza Vaccine 09/20/2016    Influenza Vaccine (Quad) PF (>6 Mo Flulaval, Fluarix, and >3 Yrs Afluria, Fluzone 81192) 10/01/2018    Influenza Vaccine (Tri) Adjuvanted (>65 Yrs FLUAD TRI 33052) 03/04/2020    Pneumococcal Polysaccharide (PPSV-23) 03/04/2020    Zoster Vaccine, Live 01/21/2014       Recent Vital Data:  Patient Vitals for the past 24 hrs:   Temp Pulse Resp BP SpO2   03/12/22 1200 98.2 °F (36.8 °C) 74 16 (!) 116/58 95 %   03/12/22 0736 97.7 °F (36.5 °C) 64 18 118/60 96 %   03/12/22 0320 97.7 °F (36.5 °C) 84 18 114/65 96 %   03/11/22 2320 98.5 °F (36.9 °C) 75 18 102/62 99 %   03/11/22 2014 98.6 °F (37 °C) 79 16 107/63 98 %   03/11/22 1957     97 %   03/11/22 1544  91 12 133/69 98 %   03/11/22 1539  91 12 133/69 97 %   03/11/22 1534  90 12 (!) 115/59 93 %   03/11/22 1529  85 12 119/62 98 %   03/11/22 1524 98 °F (36.7 °C) 89 12 (!) 108/54 99 %     Oxygen Therapy  O2 Sat (%): 95 % (03/12/22 1200)  Pulse via Oximetry: 78 beats per minute (03/12/22 0736)  O2 Device: None (Room air) (03/12/22 0736)  Skin Assessment: Clean, dry, & intact (03/12/22 0736)  Skin Protection for O2 Device: N/A (03/12/22 0736)  O2 Flow Rate (L/min): 0 l/min (03/12/22 0736)    Estimated body mass index is 25.84 kg/m² as calculated from the following:    Height as of this encounter: 5' 9\" (1.753 m). Weight as of this encounter: 79.4 kg (175 lb 0.2 oz). Intake/Output Summary (Last 24 hours) at 3/12/2022 1427  Last data filed at 3/11/2022 1815  Gross per 24 hour   Intake 350 ml   Output 250 ml   Net 100 ml         Physical Exam:  General:    malnourished. No overt distress, thin  Head:  Normocephalic, atraumatic  Eyes:  Sclerae appear normal.  Pupils equally round. HENT:  Nares appear normal, no drainage. Moist mucous membranes  Neck:  No restricted ROM. Trachea midline  CV:   RRR. No m/r/g. No JVD  Lungs:   CTAB. No wheezing, rhonchi, or rales. Even, unlabored  Abdomen:   Soft, nontender, nondistended. Extremities: Warm and dry. No cyanosis or clubbing. No edema. Skin:     No rashes. Normal coloration  Neuro:  CN II-XII grossly intact. Psych:  Normal mood and affect. Signed:  Veena Chinchilla MD    Part of this note may have been written by using a voice dictation software. The note has been proof read but may still contain some grammatical/other typographical errors.

## 2022-03-12 NOTE — DISCHARGE INSTRUCTIONS
Patient Education        Learning About Colonoscopy  What is a colonoscopy? A colonoscopy is a test (also called a procedure) that lets a doctor look inside your large intestine. The doctor uses a thin, lighted tube called a colonoscope. The doctor uses it to look for small growths called polyps, colon or rectal cancer (colorectal cancer), or other problems like bleeding. During the procedure, the doctor can take samples of tissue. The samples can then be checked for cancer or other conditions. The doctor can also take out polyps. How is a colonoscopy done? This procedure is done in a doctor's office or a clinic or hospital. You will get medicine to help you relax and not feel pain. Some people find that they don't remember having the test because of the medicine. The doctor gently moves the colonoscope, or scope, through the colon. The scope is also a small video camera. It lets the doctor see the colon and take pictures. How do you prepare for the procedure? You need to clean out your colon before the procedure so the doctor can see your colon. This depends on which \"colon prep\" your doctor recommends. To clean out your colon, you'll do a \"colon prep\" before the test. This means you stop eating solid foods and drink only clear liquids. You can have water, tea, coffee, clear juices, clear broths, flavored ice pops, and gelatin (such as Jell-O). Do not drink anything red or purple. The day or night before the procedure, you drink a large amount of a special liquid. This causes loose, frequent stools. You will go to the bathroom a lot. Your doctor may have you drink part of the liquid the evening before and the rest on the day of the test. It's very important to drink all of the liquid. If you have problems drinking it, call your doctor. Arrange to have someone take you home after the test.  What can you expect after a colonoscopy?   Your doctor will tell you when you can eat and do your usual activities. Drink a lot of fluid after the test to replace the fluids you may have lost during the colon prep. But don't drink alcohol. Your doctor will talk to you about when you'll need your next colonoscopy. The results of your test and your risk for colorectal cancer will help your doctor decide how often you need to be checked. After the test, you may be bloated or have gas pains. You may need to pass gas. If a biopsy was done or a polyp was removed, you may have streaks of blood in your stool (feces) for a few days. Check with your doctor to see when it is safe to take aspirin and nonsteroidal anti-inflammatory drugs (NSAIDs) again. Problems such as heavy rectal bleeding may not occur until several weeks after the test. This isn't common. But it can happen after polyps are removed. Follow-up care is a key part of your treatment and safety. Be sure to make and go to all appointments, and call your doctor if you are having problems. It's also a good idea to know your test results and keep a list of the medicines you take. Where can you learn more? Go to http://www.gray.com/  Enter Z368 in the search box to learn more about \"Learning About Colonoscopy. \"  Current as of: September 8, 2021               Content Version: 13.2  © 2006-2022 Healthwise, [employer]. Care instructions adapted under license by [employer] (which disclaims liability or warranty for this information). If you have questions about a medical condition or this instruction, always ask your healthcare professional. [place] [address] any warranty or liability for your use of this information.

## 2022-03-12 NOTE — PROGRESS NOTES
Care Management Interventions  PCP Verified by CM:  Yes  Transition of Care Consult (CM Consult): [address]: Yes  Support Systems: Friend/Neighbor  Confirm Follow Up Transport: Family  The Plan for Transition of Care is Related to the Following Treatment Goals : increase endurance  The Patient and/or Patient Representative was Provided with a Choice of Provider and Agrees with the Discharge Plan?: Yes  Freedom of Choice List was Provided with Basic Dialogue that Supports the Patient's Individualized Plan of Care/Goals, Treatment Preferences and Shares the Quality Data Associated with the Providers?: Yes  Discharge Location  Patient Expects to be Discharged to[de-identified] Home with home health

## 2022-03-13 ENCOUNTER — HOME HEALTH ADMISSION (OUTPATIENT)
Dept: HOME HEALTH SERVICES | Facility: HOME HEALTH | Age: 77
End: 2022-03-13

## 2022-03-14 ENCOUNTER — HOME CARE VISIT (OUTPATIENT)
Dept: HOME HEALTH SERVICES | Facility: HOME HEALTH | Age: 77
End: 2022-03-14

## 2022-03-18 PROBLEM — K92.2 GI BLEED: Status: ACTIVE | Noted: 2022-03-10

## 2022-03-19 PROBLEM — N39.0 UTI DUE TO EXTENDED-SPECTRUM BETA LACTAMASE (ESBL) PRODUCING ESCHERICHIA COLI: Status: ACTIVE | Noted: 2021-08-04

## 2022-03-19 PROBLEM — F17.210 CIGARETTE NICOTINE DEPENDENCE WITHOUT COMPLICATION: Status: ACTIVE | Noted: 2019-05-16

## 2022-03-19 PROBLEM — R35.1 BENIGN PROSTATIC HYPERPLASIA WITH NOCTURIA: Status: ACTIVE | Noted: 2021-08-01

## 2022-03-19 PROBLEM — B96.29 UTI DUE TO EXTENDED-SPECTRUM BETA LACTAMASE (ESBL) PRODUCING ESCHERICHIA COLI: Status: ACTIVE | Noted: 2021-08-04

## 2022-03-19 PROBLEM — T18.108A ESOPHAGEAL FOREIGN BODY, INITIAL ENCOUNTER: Status: ACTIVE | Noted: 2018-01-19

## 2022-03-19 PROBLEM — D64.9 ANEMIA: Status: ACTIVE | Noted: 2021-08-01

## 2022-03-19 PROBLEM — N17.9 AKI (ACUTE KIDNEY INJURY) (HCC): Status: ACTIVE | Noted: 2021-08-04

## 2022-03-19 PROBLEM — N40.1 BENIGN PROSTATIC HYPERPLASIA WITH NOCTURIA: Status: ACTIVE | Noted: 2021-08-01

## 2022-03-19 PROBLEM — I50.20 HFREF (HEART FAILURE WITH REDUCED EJECTION FRACTION) (HCC): Status: ACTIVE | Noted: 2021-08-04

## 2022-03-19 PROBLEM — E11.21 TYPE 2 DIABETES WITH NEPHROPATHY (HCC): Status: ACTIVE | Noted: 2018-11-02

## 2022-03-19 PROBLEM — Z72.0 TOBACCO ABUSE: Status: ACTIVE | Noted: 2019-02-21

## 2022-03-19 PROBLEM — T50.B95A ADVERSE EFFECT OF COVID-19 VACCINE: Status: ACTIVE | Noted: 2021-08-01

## 2022-03-19 PROBLEM — Z16.12 UTI DUE TO EXTENDED-SPECTRUM BETA LACTAMASE (ESBL) PRODUCING ESCHERICHIA COLI: Status: ACTIVE | Noted: 2021-08-04

## 2022-03-19 PROBLEM — D50.8 IRON DEFICIENCY ANEMIA DUE TO DIETARY CAUSES: Status: ACTIVE | Noted: 2019-08-19

## 2022-05-10 PROBLEM — L97.511 SKIN ULCER OF RIGHT GREAT TOE, LIMITED TO BREAKDOWN OF SKIN (HCC): Status: ACTIVE | Noted: 2022-05-10

## 2022-05-31 ENCOUNTER — CARE COORDINATION (OUTPATIENT)
Dept: CARE COORDINATION | Facility: CLINIC | Age: 77
End: 2022-05-31

## 2022-06-01 ENCOUNTER — TELEPHONE (OUTPATIENT)
Dept: INTERNAL MEDICINE CLINIC | Facility: CLINIC | Age: 77
End: 2022-06-01

## 2022-06-08 ENCOUNTER — CARE COORDINATION (OUTPATIENT)
Dept: CARE COORDINATION | Facility: CLINIC | Age: 77
End: 2022-06-08

## 2022-06-14 ENCOUNTER — CARE COORDINATION (OUTPATIENT)
Dept: CARE COORDINATION | Facility: CLINIC | Age: 77
End: 2022-06-14

## 2022-06-20 ENCOUNTER — TELEPHONE (OUTPATIENT)
Dept: INTERNAL MEDICINE CLINIC | Facility: CLINIC | Age: 77
End: 2022-06-20

## 2022-06-20 NOTE — TELEPHONE ENCOUNTER
Pt c/o feet stinging and burning really bad and can hardly walk on them. [address] [facility] Shaun had given pt Allopurinol for gouty arthritis and pt has been taking med. Pt wants to know what he can do for the burning.   Please advise

## 2022-06-22 ENCOUNTER — OFFICE VISIT (OUTPATIENT)
Dept: INTERNAL MEDICINE CLINIC | Facility: CLINIC | Age: 77
End: 2022-06-22
Payer: MEDICARE

## 2022-06-22 VITALS
SYSTOLIC BLOOD PRESSURE: 107 MMHG | HEART RATE: 87 BPM | TEMPERATURE: 97.3 F | BODY MASS INDEX: 19.4 KG/M2 | DIASTOLIC BLOOD PRESSURE: 48 MMHG | WEIGHT: 131 LBS | RESPIRATION RATE: 16 BRPM | HEIGHT: 69 IN | OXYGEN SATURATION: 97 %

## 2022-06-22 DIAGNOSIS — R09.89 DIMINISHED PULSES IN LOWER EXTREMITY: ICD-10-CM

## 2022-06-22 DIAGNOSIS — Z72.0 TOBACCO ABUSE: ICD-10-CM

## 2022-06-22 DIAGNOSIS — M79.671 BILATERAL FOOT PAIN: Primary | ICD-10-CM

## 2022-06-22 DIAGNOSIS — M79.672 BILATERAL FOOT PAIN: Primary | ICD-10-CM

## 2022-06-22 PROCEDURE — 1123F ACP DISCUSS/DSCN MKR DOCD: CPT | Performed by: INTERNAL MEDICINE

## 2022-06-22 PROCEDURE — 99214 OFFICE O/P EST MOD 30 MIN: CPT | Performed by: INTERNAL MEDICINE

## 2022-06-22 RX ORDER — PANTOPRAZOLE SODIUM 40 MG/10ML
40 INJECTION, POWDER, LYOPHILIZED, FOR SOLUTION INTRAVENOUS DAILY
COMMUNITY

## 2022-06-22 RX ORDER — TORSEMIDE 20 MG/1
20 TABLET ORAL 2 TIMES DAILY
COMMUNITY
Start: 2022-05-02

## 2022-06-22 RX ORDER — DILTIAZEM HYDROCHLORIDE 60 MG/1
TABLET, FILM COATED ORAL
COMMUNITY
Start: 2022-04-04

## 2022-06-22 RX ORDER — POTASSIUM CHLORIDE 20MEQ/15ML
20 LIQUID (ML) ORAL DAILY
COMMUNITY

## 2022-06-22 RX ORDER — TAMSULOSIN HYDROCHLORIDE 0.4 MG/1
0.4 CAPSULE ORAL DAILY
COMMUNITY
Start: 2021-08-21

## 2022-06-22 RX ORDER — ASPIRIN 81 MG/1
81 TABLET, CHEWABLE ORAL DAILY
COMMUNITY
Start: 2022-04-04

## 2022-06-22 RX ORDER — ALLOPURINOL 300 MG/1
TABLET ORAL
COMMUNITY
Start: 2022-06-09 | End: 2022-10-04 | Stop reason: SDUPTHER

## 2022-06-22 RX ORDER — FERROUS SULFATE 325(65) MG
TABLET ORAL
COMMUNITY
Start: 2022-04-04 | End: 2022-10-04 | Stop reason: SDUPTHER

## 2022-06-22 RX ORDER — GABAPENTIN 100 MG/1
100 CAPSULE ORAL NIGHTLY
Qty: 30 CAPSULE | Refills: 5 | Status: SHIPPED | OUTPATIENT
Start: 2022-06-22 | End: 2022-07-08 | Stop reason: SDUPTHER

## 2022-06-22 RX ORDER — GLIPIZIDE 5 MG/1
5 TABLET ORAL DAILY
COMMUNITY
Start: 2022-03-02 | End: 2022-10-04 | Stop reason: SDUPTHER

## 2022-06-22 RX ORDER — LISINOPRIL 40 MG/1
TABLET ORAL
COMMUNITY
Start: 2022-04-05 | End: 2022-08-22 | Stop reason: SDUPTHER

## 2022-06-22 RX ORDER — HYDROCODONE BITARTRATE AND ACETAMINOPHEN 10; 325 MG/1; MG/1
1 TABLET ORAL EVERY 8 HOURS PRN
COMMUNITY
Start: 2021-06-29

## 2022-06-22 RX ORDER — ALBUTEROL SULFATE 90 UG/1
2 AEROSOL, METERED RESPIRATORY (INHALATION) EVERY 4 HOURS PRN
COMMUNITY
Start: 2022-03-02

## 2022-06-22 RX ORDER — ATORVASTATIN CALCIUM 40 MG/1
40 TABLET, FILM COATED ORAL DAILY
COMMUNITY

## 2022-06-22 RX ORDER — NICOTINE 21 MG/24HR
1 PATCH, TRANSDERMAL 24 HOURS TRANSDERMAL DAILY
Qty: 42 PATCH | Refills: 0 | Status: SHIPPED | OUTPATIENT
Start: 2022-06-22 | End: 2022-08-03

## 2022-06-22 RX ORDER — CARVEDILOL 25 MG/1
25 TABLET ORAL 2 TIMES DAILY
COMMUNITY
Start: 2022-05-11

## 2022-06-22 ASSESSMENT — ENCOUNTER SYMPTOMS
RHINORRHEA: 0
SHORTNESS OF BREATH: 0
COUGH: 0
ABDOMINAL PAIN: 0
BLOOD IN STOOL: 0

## 2022-06-22 ASSESSMENT — PATIENT HEALTH QUESTIONNAIRE - PHQ9
SUM OF ALL RESPONSES TO PHQ QUESTIONS 1-9: 0
SUM OF ALL RESPONSES TO PHQ QUESTIONS 1-9: 0
SUM OF ALL RESPONSES TO PHQ9 QUESTIONS 1 & 2: 0
2. FEELING DOWN, DEPRESSED OR HOPELESS: 0
SUM OF ALL RESPONSES TO PHQ QUESTIONS 1-9: 0
1. LITTLE INTEREST OR PLEASURE IN DOING THINGS: 0
SUM OF ALL RESPONSES TO PHQ QUESTIONS 1-9: 0

## 2022-06-22 NOTE — PROGRESS NOTES
[facility]      2022    Patient Name: Boby Chauhan  :  1945    Subjective:    Chief Complaint:  Chief Complaint   Patient presents with    Other     foot pain         HPI c/o soreness in his feet \"for quite a while\"; c/o burning, soreness when he walks; he denies swelling in his feet; He takes Norco tid prn for chronic back pain, with some relief; he's also tried otc foot spray, with minimal relief; She smokes 1 pack per day, for \"long time\"; discussed patches and would like to try this; Past Medical History:   Diagnosis Date    BPH (benign prostatic hyperplasia)     CHF (NYHA class III, ACC/AHA stage C) [facility]     Chronic kidney disease     CKD (chronic kidney disease)     COPD (chronic obstructive pulmonary disease) [facility]     Coronary atherosclerosis of native coronary artery     Diabetes mellitus with circulatory complication ([facility])     DM neuropathies ([facility])     Dyslipidemia     GERD (gastroesophageal reflux disease)     Hyperlipidemia     Hypertension     Ischemic cardiomyopathy     Mitral regurgitation     PVD (peripheral vascular disease) ([facility])        Past Surgical History:   Procedure Laterality Date    CAROTID ENDARTERECTOMY      CORONARY ARTERY BYPASS GRAFT         Family History   Problem Relation Age of Onset    Diabetes Mother     Diabetes Brother     Hypertension Brother     Heart Attack Father        Social History     Tobacco Use    Smoking status: Current Every Day Smoker     Packs/day: 0.60    Smokeless tobacco: Never Used    Tobacco comment: Quit smoking: almost a pack a day now, been smoking since 24years old   Substance Use Topics    Alcohol use: Yes     Alcohol/week: 1.0 standard drink    Drug use: No                 Current Outpatient Medications:     HYDROcodone-acetaminophen (NORCO)  MG per tablet, Take 1 tablet by mouth every 8 hours as needed. , Disp: , Rfl:     lisinopril (PRINIVIL;ZESTRIL) 40 MG tablet, TAKE 1 TABLET BY MOUTH DAILY, Disp: , Rfl:     aspirin 81 MG chewable tablet, Take 81 mg by mouth daily, Disp: , Rfl:     carvedilol (COREG) 25 MG tablet, Take 25 mg by mouth 2 times daily, Disp: , Rfl:     glipiZIDE (GLUCOTROL) 5 MG tablet, Take 5 mg by mouth daily, Disp: , Rfl:     torsemide (DEMADEX) 20 MG tablet, Take 20 mg by mouth 2 times daily, Disp: , Rfl:     FEROSUL 325 (65 Fe) MG tablet, TAKE 1 TABLET BY MOUTH DAILY WITH BREAKFAST, Disp: , Rfl:     atorvastatin (LIPITOR) 40 MG tablet, Take 40 mg by mouth daily, Disp: , Rfl:     pantoprazole (PROTONIX) 40 MG injection, Infuse 40 mg intravenously daily, Disp: , Rfl:     tamsulosin (FLOMAX) 0.4 MG capsule, Take 0.4 mg by mouth daily, Disp: , Rfl:     albuterol sulfate HFA (PROVENTIL;VENTOLIN;PROAIR) 108 (90 Base) MCG/ACT inhaler, Inhale 2 puffs into the lungs every 4 hours as needed, Disp: , Rfl:     nicotine (NICODERM CQ) 21 MG/24HR, Place 1 patch onto the skin daily, Disp: 42 patch, Rfl: 0    gabapentin (NEURONTIN) 100 MG capsule, Take 1 capsule by mouth nightly for 30 days. , Disp: 30 capsule, Rfl: 5    potassium chloride 20 MEQ/15ML (10%) oral solution, Take 20 mEq by mouth daily, Disp: , Rfl:     albuterol-ipratropium (COMBIVENT RESPIMAT)  MCG/ACT AERS inhaler, Inhale 1 puff into the lungs, Disp: , Rfl:     SYMBICORT 80-4.5 MCG/ACT AERO, INHALE 2 PUFFS BY MOUTH TWICE DAILY, Disp: , Rfl:     allopurinol (ZYLOPRIM) 300 MG tablet, TAKE 1 TABLET BY MOUTH DAILY, Disp: , Rfl:     Allergies   Allergen Reactions    Iohexol Hives    Other Hives    Iodine Rash       Review of Systems   Constitutional: Negative for chills, fatigue and fever. HENT: Negative for congestion, postnasal drip and rhinorrhea. Eyes: Negative for visual disturbance. Respiratory: Negative for cough and shortness of breath. Cardiovascular: Negative for chest pain and palpitations. Gastrointestinal: Negative for abdominal pain and blood in stool.    Genitourinary: Negative for dysuria and frequency. Musculoskeletal: Positive for arthralgias. Negative for myalgias. Skin: Negative. Neurological: Negative for numbness and headaches. Psychiatric/Behavioral: Negative for dysphoric mood and sleep disturbance. The patient is not nervous/anxious. All other systems reviewed and are negative. Objective:  BP (!) 107/48 (Site: Left Upper Arm, Position: Sitting, Cuff Size: Small Adult)   Pulse 87   Temp 97.3 °F (36.3 °C) (Temporal)   Resp 16   Ht 5' 9\" (1.753 m)   Wt 131 lb (59.4 kg)   SpO2 97%   BMI 19.35 kg/m²     Examination:  Physical Exam  Vitals reviewed. Constitutional:       Appearance: Normal appearance. HENT:      Head: Normocephalic and atraumatic. Nose: Nose normal.      Mouth/Throat:      Mouth: Mucous membranes are moist.      Pharynx: Oropharynx is clear. Eyes:      Extraocular Movements: Extraocular movements intact. Conjunctiva/sclera: Conjunctivae normal.      Pupils: Pupils are equal, round, and reactive to light. Cardiovascular:      Rate and Rhythm: Normal rate and regular rhythm. Pulses: Normal pulses. Heart sounds: Normal heart sounds. Pulmonary:      Effort: Pulmonary effort is normal.      Breath sounds: Normal breath sounds. Abdominal:      General: Abdomen is flat. Bowel sounds are normal.      Palpations: Abdomen is soft. Musculoskeletal:         General: Normal range of motion. Cervical back: Normal range of motion and neck supple. Skin:     General: Skin is warm and dry. Neurological:      General: No focal deficit present. Mental Status: He is alert and oriented to person, place, and time. Mental status is at baseline.    Psychiatric:         Mood and Affect: Mood normal.         Behavior: Behavior normal.       Diabetic foot exam:   Left Foot:   Visual Exam: callous- 1; dry skin   Pulse DP: trace   Filament test: reduced sensation   Vibratory Sensation: diminished  Right Foot:   Visual Exam: callous- 1, dry skin   Pulse DP: trace   Filament test: reduced sensation   Vibratory Sensation: diminished      Assessment/Plan:    Rodrigo James was seen today for other. Diagnoses and all orders for this visit:    Bilateral foot pain  Instructed to take medications as prescribed, and to call if no improvement in symptoms. -     Vascular duplex lower extremity arteries bilateral with EMILY; Future  -     gabapentin (NEURONTIN) 100 MG capsule; Take 1 capsule by mouth nightly for 30 days. Tobacco abuse  Trial of nicotine patches, instructed to use as directed; discussed smoking cessation in detail, and is ready to quit;     -     nicotine (NICODERM CQ) 21 MG/24HR; Place 1 patch onto the skin daily  -     Vascular duplex lower extremity arteries bilateral with EMILY; Future    Diminished pulses in lower extremity  -     Vascular duplex lower extremity arteries bilateral with EMILY; Future          Follow-up and Dispositions    · Return in about 5 months (around 11/22/2022), or if symptoms worsen or fail to improve, for medicare annual w/ labs. Medication Reconciliation:  Current Medications Verified: Current medications/ immunizations were reviewed, including purpose, with patient. Family History, Social History, Current and Past Medical History was reviewed with patient and updated at today's office visit. Medication Reconciliation list was given to patient/ family. Patient was advised to discard old medication lists and provide all providers with current list at each visit and carry list with them in case of emergency.     Completed By:   Brisa Graham MD    [facility] & [address],  [place], [address] [facility]  [phone]  [phone] fax  11:38 AM

## 2022-06-27 ENCOUNTER — CARE COORDINATION (OUTPATIENT)
Dept: CARE COORDINATION | Facility: CLINIC | Age: 77
End: 2022-06-27

## 2022-06-27 NOTE — CARE COORDINATION
Ambulatory Care Coordination Note  6/27/2022  CM Risk Score: 3  Charlson 10 Year Mortality Risk Score: 98%     ACC: Dee Landaverde RN    Summary Note: Pt reports he has a new phone, has been unreachable of late. Reviewed recent PCP f/u, confirms use of Gabapentin, not helping yet w/ his pain, agrees to continue and give it time to work. Aware of pending Duplex scan. Discussed smoking cessation and was unaware PCP RX'd Nicotine patches. He will call pharmacy to confirm ready and go  - cautioned to not smoke while wearing patch. Jamal discussed 1-800- QUIT-Now if patches not covered by insurance. Lab Results     None              Goals Addressed                 This Visit's Progress     Care Coordination Self Management        CC Self Management Goal  Patient Goal   Patient is able to discuss self-management of condition(s): CHF, DM, CKD, GERD  Pt demonstrates adherence to medications  Pt demonstrates understanding of self-monitoring  Patient is able to identify Red Flags:  Alert to potential adverse drug reactions(s) or side effects and actions to take should they arise  Discuss target symptoms and actions to take should they arise  Identify problems that require immediate PCP or specialist visit  Patient demonstrates understanding of access to PCP/Specialist:  Understands about scheduling routine Follow Up appointments   Understands about sick day appointment options for worsening of symptoms/progression (Same Day, E Visits)            Prior to Admission medications    Medication Sig Start Date End Date Taking? Authorizing Provider   HYDROcodone-acetaminophen (NORCO)  MG per tablet Take 1 tablet by mouth every 8 hours as needed.  6/29/21   Historical Provider, MD   lisinopril (PRINIVIL;ZESTRIL) 40 MG tablet TAKE 1 TABLET BY MOUTH DAILY 4/5/22   Historical Provider, MD   aspirin 81 MG chewable tablet Take 81 mg by mouth daily 4/4/22   Historical Provider, MD   carvedilol (COREG) 25 MG tablet Take 25 mg by mouth 2 times daily 5/11/22   Historical Provider, MD   glipiZIDE (GLUCOTROL) 5 MG tablet Take 5 mg by mouth daily 3/2/22   Historical Provider, MD   torsemide (DEMADEX) 20 MG tablet Take 20 mg by mouth 2 times daily 5/2/22   Historical Provider, MD   FEROSUL 325 (65 Fe) MG tablet TAKE 1 TABLET BY MOUTH DAILY WITH BREAKFAST 4/4/22   Historical Provider, MD   atorvastatin (LIPITOR) 40 MG tablet Take 40 mg by mouth daily    Historical Provider, MD   pantoprazole (PROTONIX) 40 MG injection Infuse 40 mg intravenously daily    Historical Provider, MD   tamsulosin (FLOMAX) 0.4 MG capsule Take 0.4 mg by mouth daily 8/21/21   Historical Provider, MD   potassium chloride 20 MEQ/15ML (10%) oral solution Take 20 mEq by mouth daily    Historical Provider, MD   albuterol-ipratropium (COMBIVENT RESPIMAT)  MCG/ACT AERS inhaler Inhale 1 puff into the lungs    Historical Provider, MD   SYMBICORT 80-4.5 MCG/ACT AERO INHALE 2 PUFFS BY MOUTH TWICE DAILY 4/4/22   Historical Provider, MD   allopurinol (ZYLOPRIM) 300 MG tablet TAKE 1 TABLET BY MOUTH DAILY 6/9/22   Historical Provider, MD   albuterol sulfate HFA (PROVENTIL;VENTOLIN;PROAIR) 108 (90 Base) MCG/ACT inhaler Inhale 2 puffs into the lungs every 4 hours as needed 3/2/22   Historical Provider, MD   nicotine (NICODERM CQ) 21 MG/24HR Place 1 patch onto the skin daily 6/22/22 8/3/22  Elijah Dance, MD   gabapentin (NEURONTIN) 100 MG capsule Take 1 capsule by mouth nightly for 30 days.  6/22/22 7/22/22  Elijah Dance, MD       Future Appointments   Date Time Provider Blue Ammy   11/11/2022  9:45 AM MAT [facility] AM[facility]   11/14/2022 10:30 AM DO ADDIS Schulz [facility] AMB   11/18/2022  1:00 PM Oanh Singleton, APRN - CNP [facility] AMB

## 2022-07-07 RX ORDER — GLIPIZIDE 5 MG/1
TABLET ORAL
Qty: 90 TABLET | OUTPATIENT
Start: 2022-07-07

## 2022-07-08 ENCOUNTER — TELEPHONE (OUTPATIENT)
Dept: INTERNAL MEDICINE CLINIC | Facility: CLINIC | Age: 77
End: 2022-07-08

## 2022-07-08 DIAGNOSIS — M79.672 BILATERAL FOOT PAIN: ICD-10-CM

## 2022-07-08 DIAGNOSIS — M79.671 BILATERAL FOOT PAIN: ICD-10-CM

## 2022-07-08 RX ORDER — GABAPENTIN 300 MG/1
300 CAPSULE ORAL NIGHTLY
Qty: 30 CAPSULE | Refills: 1 | Status: SHIPPED | OUTPATIENT
Start: 2022-07-08 | End: 2022-07-08 | Stop reason: SDUPTHER

## 2022-07-08 RX ORDER — GABAPENTIN 300 MG/1
300 CAPSULE ORAL 2 TIMES DAILY
Qty: 30 CAPSULE | Refills: 1 | Status: SHIPPED | OUTPATIENT
Start: 2022-07-08 | End: 2022-07-15 | Stop reason: SDUPTHER

## 2022-07-08 NOTE — TELEPHONE ENCOUNTER
----- Message from HAWK Adam CNP sent at 7/8/2022 11:07 AM EDT -----  Regarding: Gabapentin  Please let patient know that he is on a very low dose of gabapentin. Advise increasing this to 300mg nightly. Return if his symptoms do not improve with this dose change. I will send a new prescription.

## 2022-07-08 NOTE — TELEPHONE ENCOUNTER
----- Message from HAWK Wesley CNP sent at 7/8/2022 11:33 AM EDT -----  Please advise patient to start on 300mg morning and evening. New script sent to pharmacy.

## 2022-07-08 NOTE — TELEPHONE ENCOUNTER
Pt says he has burning in his feet and was taking gabapentin for it but it is not helping. Pt would like to know what he can do.

## 2022-07-13 ENCOUNTER — CARE COORDINATION (OUTPATIENT)
Dept: CARE COORDINATION | Facility: CLINIC | Age: 77
End: 2022-07-13

## 2022-07-15 ENCOUNTER — TELEMEDICINE (OUTPATIENT)
Dept: INTERNAL MEDICINE CLINIC | Facility: CLINIC | Age: 77
End: 2022-07-15
Payer: MEDICARE

## 2022-07-15 DIAGNOSIS — M79.672 BILATERAL FOOT PAIN: ICD-10-CM

## 2022-07-15 DIAGNOSIS — M79.671 BILATERAL FOOT PAIN: ICD-10-CM

## 2022-07-15 DIAGNOSIS — E11.42 DIABETIC POLYNEUROPATHY ASSOCIATED WITH TYPE 2 DIABETES MELLITUS (HCC): Primary | ICD-10-CM

## 2022-07-15 PROCEDURE — 99442 PR PHYS/QHP TELEPHONE EVALUATION 11-20 MIN: CPT | Performed by: NURSE PRACTITIONER

## 2022-07-15 RX ORDER — GABAPENTIN 300 MG/1
300 CAPSULE ORAL 2 TIMES DAILY
Qty: 60 CAPSULE | Refills: 1 | Status: SHIPPED | OUTPATIENT
Start: 2022-07-15 | End: 2022-09-13

## 2022-07-15 NOTE — PROGRESS NOTES
nicotine (NICODERM CQ) 21 MG/24HR Place 1 patch onto the skin daily 42 patch 0     No current facility-administered medications for this visit. Allergies   Allergen Reactions    Iohexol Hives    Other Hives    Iodine Rash       Past Medical History:   Diagnosis Date    BPH (benign prostatic hyperplasia)     CHF (NYHA class III, ACC/AHA stage C) ([facility])     Chronic kidney disease     CKD (chronic kidney disease)     COPD (chronic obstructive pulmonary disease) ([facility])     Coronary atherosclerosis of native coronary artery     Diabetes mellitus with circulatory complication ([facility])     DM neuropathies ([facility])     Dyslipidemia     GERD (gastroesophageal reflux disease)     Hyperlipidemia     Hypertension     Ischemic cardiomyopathy     Mitral regurgitation     PVD (peripheral vascular disease) ([facility])      Past Surgical History:   Procedure Laterality Date    CAROTID ENDARTERECTOMY      CORONARY ARTERY BYPASS GRAFT  2005     Family History   Problem Relation Age of Onset    Diabetes Mother     Diabetes Brother     Hypertension Brother     Heart Attack Father      Social History     Tobacco Use    Smoking status: Every Day     Packs/day: 0.60     Types: Cigarettes    Smokeless tobacco: Never    Tobacco comments:     Quit smoking: almost a pack a day now, been smoking since 24years old   Substance Use Topics    Alcohol use: Yes     Alcohol/week: 1.0 standard drink       Review of Systems   Neurological:         Neuropathy pain in feet and hands   All other systems reviewed and are negative. OBJECTIVE:  There were no vitals taken for this visit. Physical Exam  NA    ASSESSMENT and PLAN  Rufus Apo was seen today for other. Diagnoses and all orders for this visit:    Diabetic polyneuropathy associated with type 2 diabetes mellitus ([address]    We discussed taking 300mg BID for now. Return if this does not promote better pain control and we can adjust as needed.    Pursuant to the emergency declaration under the [facility] Act and the [employer] [place], [address] [place] [address] authority and the [employer] and [employer] General Act, this Virtual Visit was conducted, with patient's consent, to reduce the patient's risk of exposure to COVID-19 and provide continuity of care for an established patient. Services were provided through a telephone synchronous discussion virtually to substitute for in-person clinic visit. Patient consented to virtual visit. Greater than 50% of this 15 min visit was spent counseling the patient about test results, prognosis, importance of compliance, education about disease process, benefits of medications, instructions for management of acute symptoms, and follow up plans.            Magali Waters NP, APRN - CNP

## 2022-07-27 ENCOUNTER — CARE COORDINATION (OUTPATIENT)
Dept: CARE COORDINATION | Facility: CLINIC | Age: 77
End: 2022-07-27

## 2022-08-10 ENCOUNTER — CARE COORDINATION (OUTPATIENT)
Dept: CARE COORDINATION | Facility: CLINIC | Age: 77
End: 2022-08-10

## 2022-08-16 ENCOUNTER — CARE COORDINATION (OUTPATIENT)
Dept: CARE COORDINATION | Facility: CLINIC | Age: 77
End: 2022-08-16

## 2022-08-16 NOTE — CARE COORDINATION
Ambulatory Care Coordination Note  8/16/2022    ACC: Jorge Ba, RN    Summary Note: Pt calling to report painful neuropathy - reports gabapentin 300mg BID is not helping - difficult for him to stand for very long. Requests assistance w/ making a PCP appmt. Call to PCP and appmt made for 8/18 at 10am.  Also needs refill for lisinopril - call to Cardiology. Message left for refill. Called pt back and updated on both issues, appreciative. Lab Results       None            Care Coordination Interventions    Referral from Primary Care Provider: No  Suggested Interventions and Community Resources          Goals Addressed                   This Visit's Progress     Care Coordination Self Management   On track     CC Self Management Goal  Patient Goal   Patient is able to discuss self-management of condition(s): CHF, DM, CKD, GERD  Pt demonstrates adherence to medications  Pt demonstrates understanding of self-monitoring  Patient is able to identify Red Flags:  Alert to potential adverse drug reactions(s) or side effects and actions to take should they arise  Discuss target symptoms and actions to take should they arise  Identify problems that require immediate PCP or specialist visit  Patient demonstrates understanding of access to PCP/Specialist:  Understands about scheduling routine Follow Up appointments   Understands about sick day appointment options for worsening of symptoms/progression (Same Day, E Visits)              Prior to Admission medications    Medication Sig Start Date End Date Taking? Authorizing Provider   gabapentin (NEURONTIN) 300 MG capsule Take 1 capsule by mouth in the morning and at bedtime for 60 days. 7/15/22 9/13/22  HAWK Lyon - CNP   HYDROcodone-acetaminophen (NORCO)  MG per tablet Take 1 tablet by mouth every 8 hours as needed.  6/29/21   Historical Provider, MD   lisinopril (PRINIVIL;ZESTRIL) 40 MG tablet TAKE 1 TABLET BY MOUTH DAILY 4/5/22   Historical Provider, MD   aspirin 81 MG chewable tablet Take 81 mg by mouth daily 4/4/22   Historical Provider, MD   carvedilol (COREG) 25 MG tablet Take 25 mg by mouth 2 times daily 5/11/22   Historical Provider, MD   glipiZIDE (GLUCOTROL) 5 MG tablet Take 5 mg by mouth daily 3/2/22   Historical Provider, MD   torsemide (DEMADEX) 20 MG tablet Take 20 mg by mouth 2 times daily 5/2/22   Historical Provider, MD   FEROSUL 325 (65 Fe) MG tablet TAKE 1 TABLET BY MOUTH DAILY WITH BREAKFAST 4/4/22   Historical Provider, MD   atorvastatin (LIPITOR) 40 MG tablet Take 40 mg by mouth daily    Historical Provider, MD   pantoprazole (PROTONIX) 40 MG injection Infuse 40 mg intravenously daily    Historical Provider, MD   tamsulosin (FLOMAX) 0.4 MG capsule Take 0.4 mg by mouth daily 8/21/21   Historical Provider, MD   potassium chloride 20 MEQ/15ML (10%) oral solution Take 20 mEq by mouth daily    Historical Provider, MD   albuterol-ipratropium (COMBIVENT RESPIMAT)  MCG/ACT AERS inhaler Inhale 1 puff into the lungs    Historical Provider, MD   SYMBICORT 80-4.5 MCG/ACT AERO INHALE 2 PUFFS BY MOUTH TWICE DAILY 4/4/22   Historical Provider, MD   allopurinol (ZYLOPRIM) 300 MG tablet TAKE 1 TABLET BY MOUTH DAILY 6/9/22   Historical Provider, MD   albuterol sulfate HFA (PROVENTIL;VENTOLIN;PROAIR) 108 (90 Base) MCG/ACT inhaler Inhale 2 puffs into the lungs every 4 hours as needed 3/2/22   Historical Provider, MD   nicotine (NICODERM CQ) 21 MG/24HR Place 1 patch onto the skin daily 6/22/22 8/3/22  Bruno Hdz MD       Future Appointments   Date Time Provider Blue Gimenez   11/11/2022  9:45 AM MAT LAB [facility] AMB   11/14/2022 10:30 AM DO ADDIS Sawyer [facility] AMB   11/18/2022  1:00 PM Oanh Ojeda, APRN - CNP [facility] AMB

## 2022-08-18 ENCOUNTER — OFFICE VISIT (OUTPATIENT)
Dept: INTERNAL MEDICINE CLINIC | Facility: CLINIC | Age: 77
End: 2022-08-18
Payer: MEDICARE

## 2022-08-18 VITALS
WEIGHT: 118 LBS | OXYGEN SATURATION: 98 % | HEART RATE: 82 BPM | DIASTOLIC BLOOD PRESSURE: 60 MMHG | SYSTOLIC BLOOD PRESSURE: 140 MMHG | HEIGHT: 69 IN | TEMPERATURE: 97.5 F | BODY MASS INDEX: 17.48 KG/M2

## 2022-08-18 DIAGNOSIS — R63.0 LOSS OF APPETITE: ICD-10-CM

## 2022-08-18 DIAGNOSIS — E11.59 TYPE 2 DIABETES MELLITUS WITH OTHER CIRCULATORY COMPLICATIONS (HCC): ICD-10-CM

## 2022-08-18 DIAGNOSIS — L60.0 ONYCHOMYCOSIS WITH INGROWN TOENAIL: ICD-10-CM

## 2022-08-18 DIAGNOSIS — L84 CORN OF TOE: Primary | ICD-10-CM

## 2022-08-18 DIAGNOSIS — M79.671 BILATERAL FOOT PAIN: ICD-10-CM

## 2022-08-18 DIAGNOSIS — B35.1 ONYCHOMYCOSIS WITH INGROWN TOENAIL: ICD-10-CM

## 2022-08-18 DIAGNOSIS — M79.672 BILATERAL FOOT PAIN: ICD-10-CM

## 2022-08-18 PROCEDURE — 99214 OFFICE O/P EST MOD 30 MIN: CPT | Performed by: PHYSICIAN ASSISTANT

## 2022-08-18 PROCEDURE — 1123F ACP DISCUSS/DSCN MKR DOCD: CPT | Performed by: PHYSICIAN ASSISTANT

## 2022-08-18 PROCEDURE — 3044F HG A1C LEVEL LT 7.0%: CPT | Performed by: PHYSICIAN ASSISTANT

## 2022-08-18 ASSESSMENT — PATIENT HEALTH QUESTIONNAIRE - PHQ9
1. LITTLE INTEREST OR PLEASURE IN DOING THINGS: 1
SUM OF ALL RESPONSES TO PHQ QUESTIONS 1-9: 2
2. FEELING DOWN, DEPRESSED OR HOPELESS: 1
SUM OF ALL RESPONSES TO PHQ9 QUESTIONS 1 & 2: 2
SUM OF ALL RESPONSES TO PHQ QUESTIONS 1-9: 2

## 2022-08-18 NOTE — Clinical Note
Maybe call Care Coordinators to see if they might be able to help him evaluate whether or not he might qualify for Medicaid as well which should help  the copays that Medicare leaves. .. ?

## 2022-08-18 NOTE — Clinical Note
Please look into what resources we may have to utilize or recommend to this patient to get the help he needs - really needs a podiatrist but we don't have one IN the system. He had to quit his part time job due to the foot pain and in order to relieve the foot pain he needs to have corn removed and possibly some toenails that are severely diseased and ingrown but cannot go to his usual podiatrist due to not having $ to contribute to copay.

## 2022-08-18 NOTE — PATIENT INSTRUCTIONS
Suggest applying corn pads to the affected areas to provide some cushion/support  Counseled that this specific pain/issue he is having is not from neuropathy which is why the Gabapentin doesn't seem to be helping  Recommend Boost Diabetes Control or Glucerna drinks for meal replacement due to low appetite recently  Advised scheduling an office visit with his PCP - Dr. Cheyanne Edmond to explore his unexplained recent weight loss  Directed patient to contact his cardiologist - Dr. Azalia Damon to request refills of Lisinopril

## 2022-08-18 NOTE — PROGRESS NOTES
Andie Schmid (:  1945) is a 67 y.o. male,Established patient, here for evaluation of the following chief complaint(s): Foot Pain (Off and on for years but getting worse) and Cerumen Impaction (Pt feels like he has wax in his L ear)         ASSESSMENT/PLAN:  1. Corn of toe  Comments:  R Foot  2. Onychomycosis with ingrown toenail  Comments:  L Foot  3. Bilateral foot pain  4. Type 2 diabetes mellitus with other circulatory complications (HCC)  5. Loss of appetite      Patient Instructions   Suggest applying corn pads to the affected areas to provide some cushion/support  Counseled that this specific pain/issue he is having is not from neuropathy which is why the Gabapentin doesn't seem to be helping  Recommend Boost Diabetes Control or Glucerna drinks for meal replacement due to low appetite recently  Advised scheduling an office visit with his PCP - Dr. Amanda Traore to explore his unexplained recent weight loss  Directed patient to contact his cardiologist - Dr. Cynthia Harding to request refills of Lisinopril      Return pending feedback received about available care we can direct him to despite lack of $.         Subjective   SUBJECTIVE/OBJECTIVE:  Foot Pain   The pain is present in the right toes and left toes. This is a chronic problem. Episode onset: over a year ago. There has been no history of extremity trauma. The problem occurs constantly. The problem has been gradually worsening. The quality of the pain is described as burning. The pain is severe. Associated symptoms include an inability to bear weight, joint swelling (L 3rd toe) and a limited range of motion. Pertinent negatives include no fever, numbness or tingling. The symptoms are aggravated by standing. Treatments tried: Gabapentin & Lortab. The treatment provided no relief. His past medical history is significant for diabetes. He explains that he lost his part time job in  due to the severity of pain he was having while on his feet.   This has left him with no $ so unable to pay even a portion of his copay to see podiatrist to have this looked at. Review of Systems   Constitutional:  Negative for fever. Neurological:  Negative for tingling and numbness. Objective   Physical Exam  Constitutional:       Appearance: Normal appearance. HENT:      Head: Normocephalic and atraumatic. Eyes:      Conjunctiva/sclera: Conjunctivae normal.      Pupils: Pupils are equal, round, and reactive to light. Neck:      Vascular: No carotid bruit. Cardiovascular:      Rate and Rhythm: Normal rate and regular rhythm. Pulses: Decreased pulses (never completed the arterial ultrasound due to lack of transportation). Heart sounds: Normal heart sounds. Pulmonary:      Effort: Pulmonary effort is normal.      Breath sounds: Normal breath sounds. Musculoskeletal:      Cervical back: Normal range of motion. Feet:    Skin:     General: Skin is warm and dry. Neurological:      Mental Status: He is alert and oriented to person, place, and time. Psychiatric:         Mood and Affect: Mood normal.         Behavior: Behavior normal.         Thought Content: Thought content normal.         Judgment: Judgment normal.              An electronic signature was used to authenticate this note.     --Yumiko Lao PA-C

## 2022-08-19 ENCOUNTER — CARE COORDINATION (OUTPATIENT)
Dept: CARE COORDINATION | Facility: CLINIC | Age: 77
End: 2022-08-19

## 2022-08-19 NOTE — CARE COORDINATION
Ambulatory Care Coordination Note  8/19/2022    ACC: Fozia Kilgore RN    Summary Note: Reviewed yesterday's PCP visit, pt agreeable to [facility] referral to assist w/ locating any help. Pt needs a Podiatrist but states \"I can't afford it\". His monthly income is 7412 and his rent alone is 1051. Briefly discussed finding a roommate, someone to share expenses, but pt states he doesn't know anyone to live with. Lab Results       None            Care Coordination Interventions    Referral from Primary Care Provider: No  Suggested Interventions and Community Resources          Goals Addressed                   This Visit's Progress     Care Coordination Self Management   On track     CC Self Management Goal  Patient Goal   Patient is able to discuss self-management of condition(s): CHF, DM, CKD, GERD  Pt demonstrates adherence to medications  Pt demonstrates understanding of self-monitoring  Patient is able to identify Red Flags:  Alert to potential adverse drug reactions(s) or side effects and actions to take should they arise  Discuss target symptoms and actions to take should they arise  Identify problems that require immediate PCP or specialist visit  Patient demonstrates understanding of access to PCP/Specialist:  Understands about scheduling routine Follow Up appointments   Understands about sick day appointment options for worsening of symptoms/progression (Same Day, E Visits)              Prior to Admission medications    Medication Sig Start Date End Date Taking? Authorizing Provider   gabapentin (NEURONTIN) 300 MG capsule Take 1 capsule by mouth in the morning and at bedtime for 60 days. 7/15/22 9/13/22  HAWK Lyon CNP   HYDROcodone-acetaminophen (NORCO)  MG per tablet Take 1 tablet by mouth every 8 hours as needed.  6/29/21   Historical Provider, MD   lisinopril (PRINIVIL;ZESTRIL) 40 MG tablet TAKE 1 TABLET BY MOUTH DAILY 4/5/22   Historical Provider, MD   aspirin 81 MG chewable tablet

## 2022-08-22 ENCOUNTER — CARE COORDINATION (OUTPATIENT)
Dept: CARE COORDINATION | Facility: CLINIC | Age: 77
End: 2022-08-22

## 2022-08-22 RX ORDER — LISINOPRIL 40 MG/1
TABLET ORAL
Qty: 90 TABLET | Refills: 3 | Status: SHIPPED | OUTPATIENT
Start: 2022-08-22

## 2022-08-22 NOTE — TELEPHONE ENCOUNTER
Requested Prescriptions     Pending Prescriptions Disp Refills    lisinopril (PRINIVIL;ZESTRIL) 40 MG tablet 90 tablet 3     Sig: TAKE 1 TABLET BY MOUTH DAILY

## 2022-08-22 NOTE — TELEPHONE ENCOUNTER
MEDICATION REFILL REQUEST      Name of Medication: lisinopril   Dose:  40mg  Frequency:  once daily  Quantity:  90  Days' supply:  3 month supply      Pharmacy Name/Location:  Diandra Farris from the Care Management team called and stated she left a message on Tuesday on the prescription line and there was never a note made. Carri states that the patient is all out. Carri asks if we could take care of this ASAP.

## 2022-08-23 ENCOUNTER — CARE COORDINATION (OUTPATIENT)
Dept: CARE COORDINATION | Facility: CLINIC | Age: 77
End: 2022-08-23

## 2022-08-23 NOTE — CARE COORDINATION
ALBERT CM left VM with pt regarding referral for med/financial assistance. Awaiting response. Will try again at a later time if no call back is received.

## 2022-08-23 NOTE — CARE COORDINATION
Initial Contact Social Work Note - Ambulatory  8/23/2022      Date of referral: 8.20.22  Referral received from: ZABRINA RN  Reason for referral: community resources    Previous SW referral: No  If yes, brief summary of outcome:     Two Identifiers Verified: Yes    Insurance Provider: [employer]    Support System:  [employer]     Status: N/A    Community Providers: Medicaid Long-Term Care and may qualify for services through [employer]    ADL Assistance Needed: N/A    Housing/Living Concerns or Home Modification Needs: none     Transportation Concern: none    Medication Cost Concern: none     Medication Adherence Concern: none    Financial Concern(s): yes-struggles to pay bills and insurance/rent    Income (only if applicable): limites. Less than $1100 per month    Ability to Read/Write: Yes    Advance Care Plan:  Not on file; educated patient    Other: lives alone-still drives    Identified Needs:  Pt has some bills pending that he does have funds to cover. Insurance for car, light bill, power bill. Income is less than payments going out    Social Work Plan:  Provided number for [employer] which can assist with the utilities etc. If pt qualifies  Next Steps: remain available over the next month for any other needs    Method of Communication With Provider (if appropriate): Chart Routing       Goals Addressed                   This Visit's Progress     Supportive resources in place to maintain patient in the community (ie.  Home Health, DME equipment, refer to, medication assistant plan, etc.)        Assist pt with community resources over the next month

## 2022-08-26 ENCOUNTER — CARE COORDINATION (OUTPATIENT)
Dept: CARE COORDINATION | Facility: CLINIC | Age: 77
End: 2022-08-26

## 2022-08-26 NOTE — CARE COORDINATION
Incoming call from pt - he reports appmt w/ [facility] on Friday, Sept 2. Long discussion about what help he has applied for, hopeful for assistance. Support, encouragement offered. Plan to f/u.

## 2022-08-29 ENCOUNTER — CARE COORDINATION (OUTPATIENT)
Dept: CARE COORDINATION | Facility: CLINIC | Age: 77
End: 2022-08-29

## 2022-08-29 NOTE — CARE COORDINATION
VM left with pt to inquire about SHARE communication, as SW CM provided pt with SHARE information during previous encounter. Awaiting response.

## 2022-08-31 ENCOUNTER — CARE COORDINATION (OUTPATIENT)
Dept: CARE COORDINATION | Facility: CLINIC | Age: 77
End: 2022-08-31

## 2022-09-02 ENCOUNTER — CARE COORDINATION (OUTPATIENT)
Dept: CARE COORDINATION | Facility: CLINIC | Age: 77
End: 2022-09-02

## 2022-09-02 NOTE — CARE COORDINATION
Ambulatory Care Coordination Note  9/2/2022    ACC: Manuela Ramos, RN    Summary Note: Pt reports [place] for [place]iatry appmt but did not have an appmt after all - able to make one for next week, Tues 9/6. Pt very disappointed as he continues to have a lot of pain in his feet. Appears he missed a [facility] appmt this am - pt knew nothing about it, no indication where the referral came from. Call to [facility] wound center and they were unable to tell where the appmt originated from. Will see how Podiatry appmt goes Tuesday and then decide next steps. Reports gathering financials to complete Medicaid carlos. Lab Results       None            Care Coordination Interventions    Referral from Primary Care Provider: No  Suggested Interventions and Community Resources  Social Work: In Process          Goals Addressed    None         Prior to Admission medications    Medication Sig Start Date End Date Taking? Authorizing Provider   lisinopril (PRINIVIL;ZESTRIL) 40 MG tablet TAKE 1 TABLET BY MOUTH DAILY 8/22/22   Fabby Chirinos MD   gabapentin (NEURONTIN) 300 MG capsule Take 1 capsule by mouth in the morning and at bedtime for 60 days. 7/15/22 9/13/22  HAWK Lyon CNP   HYDROcodone-acetaminophen (NORCO)  MG per tablet Take 1 tablet by mouth every 8 hours as needed.  6/29/21   Historical Provider, MD   aspirin 81 MG chewable tablet Take 81 mg by mouth daily 4/4/22   Historical Provider, MD   carvedilol (COREG) 25 MG tablet Take 25 mg by mouth 2 times daily 5/11/22   Historical Provider, MD   glipiZIDE (GLUCOTROL) 5 MG tablet Take 5 mg by mouth daily 3/2/22   Historical Provider, MD   torsemide (DEMADEX) 20 MG tablet Take 20 mg by mouth 2 times daily 5/2/22   Historical Provider, MD   FEROSUL 325 (65 Fe) MG tablet TAKE 1 TABLET BY MOUTH DAILY WITH BREAKFAST 4/4/22   Historical Provider, MD   atorvastatin (LIPITOR) 40 MG tablet Take 40 mg by mouth daily    Historical Provider, MD   pantoprazole (PROTONIX) 40 MG injection Infuse 40 mg intravenously daily    Historical Provider, MD   tamsulosin (FLOMAX) 0.4 MG capsule Take 0.4 mg by mouth daily 8/21/21   Historical Provider, MD   potassium chloride 20 MEQ/15ML (10%) oral solution Take 20 mEq by mouth daily    Historical Provider, MD   albuterol-ipratropium (COMBIVENT RESPIMAT)  MCG/ACT AERS inhaler Inhale 1 puff into the lungs    Historical Provider, MD   SYMBICORT 80-4.5 MCG/ACT AERO INHALE 2 PUFFS BY MOUTH TWICE DAILY 4/4/22   Historical Provider, MD   allopurinol (ZYLOPRIM) 300 MG tablet TAKE 1 TABLET BY MOUTH DAILY 6/9/22   Historical Provider, MD   albuterol sulfate HFA (PROVENTIL;VENTOLIN;PROAIR) 108 (90 Base) MCG/ACT inhaler Inhale 2 puffs into the lungs every 4 hours as needed 3/2/22   Historical Provider, MD   nicotine (NICODERM CQ) 21 MG/24HR Place 1 patch onto the skin daily 6/22/22 8/3/22  Elizabeth Watt MD       Future Appointments   Date Time Provider Blue Gimenez   11/14/2022 10:30 AM DO DYLON Phillips AMB   11/18/2022  1:00 PM HAWK Poole - JESSICA RUFF [facility] AMB

## 2022-09-07 ENCOUNTER — CARE COORDINATION (OUTPATIENT)
Dept: CARE COORDINATION | Facility: CLINIC | Age: 77
End: 2022-09-07

## 2022-09-07 NOTE — CARE COORDINATION
Ambulatory Care Coordination Note  9/7/2022    ACC: Kayla Gomez, RN    Summary Note: Pt reports he went to [facility] yesterday and they would not see him due to past due bills. Agreeable to this RN calling [facility] for info - pt reports he's never been there. Called, they accept pt insurance and pt does not need a referral, appmt made for 9/26 at 10:30, be there at 10. Called pt back to update, wrote down all info, appreciative of help. Discussed low salt diet, reading labels. [employer] as able. Pt reports he has no copay on all meds except one, which is less than 2 dollars. Lab Results       None            Care Coordination Interventions    Referral from Primary Care Provider: No  Suggested Interventions and Community Resources  Social Work: In Process          Goals Addressed                   This Visit's Progress     Care Coordination Self Management   On track     CC Self Management Goal  Patient Goal   Patient is able to discuss self-management of condition(s): CHF, DM, CKD, GERD  Pt demonstrates adherence to medications  Pt demonstrates understanding of self-monitoring  Patient is able to identify Red Flags:  Alert to potential adverse drug reactions(s) or side effects and actions to take should they arise  Discuss target symptoms and actions to take should they arise  Identify problems that require immediate PCP or specialist visit  Patient demonstrates understanding of access to PCP/Specialist:  Understands about scheduling routine Follow Up appointments   Understands about sick day appointment options for worsening of symptoms/progression (Same Day, E Visits)              Prior to Admission medications    Medication Sig Start Date End Date Taking?  Authorizing Provider   lisinopril (PRINIVIL;ZESTRIL) 40 MG tablet TAKE 1 TABLET BY MOUTH DAILY 8/22/22   Garett Reich MD   gabapentin (NEURONTIN) 300 MG capsule Take 1 capsule by mouth in the morning and at bedtime for 60 days. 7/15/22 9/13/22  Jacqumalik Nesbitt APRN - CNP   HYDROcodone-acetaminophen (NORCO)  MG per tablet Take 1 tablet by mouth every 8 hours as needed.  6/29/21   Historical Provider, MD   aspirin 81 MG chewable tablet Take 81 mg by mouth daily 4/4/22   Historical Provider, MD   carvedilol (COREG) 25 MG tablet Take 25 mg by mouth 2 times daily 5/11/22   Historical Provider, MD   glipiZIDE (GLUCOTROL) 5 MG tablet Take 5 mg by mouth daily 3/2/22   Historical Provider, MD   torsemide (DEMADEX) 20 MG tablet Take 20 mg by mouth 2 times daily 5/2/22   Historical Provider, MD   FEROSUL 325 (65 Fe) MG tablet TAKE 1 TABLET BY MOUTH DAILY WITH BREAKFAST 4/4/22   Historical Provider, MD   atorvastatin (LIPITOR) 40 MG tablet Take 40 mg by mouth daily    Historical Provider, MD   pantoprazole (PROTONIX) 40 MG injection Infuse 40 mg intravenously daily    Historical Provider, MD   tamsulosin (FLOMAX) 0.4 MG capsule Take 0.4 mg by mouth daily 8/21/21   Historical Provider, MD   potassium chloride 20 MEQ/15ML (10%) oral solution Take 20 mEq by mouth daily    Historical Provider, MD   albuterol-ipratropium (COMBIVENT RESPIMAT)  MCG/ACT AERS inhaler Inhale 1 puff into the lungs    Historical Provider, MD   SYMBICORT 80-4.5 MCG/ACT AERO INHALE 2 PUFFS BY MOUTH TWICE DAILY 4/4/22   Historical Provider, MD   allopurinol (ZYLOPRIM) 300 MG tablet TAKE 1 TABLET BY MOUTH DAILY 6/9/22   Historical Provider, MD   albuterol sulfate HFA (PROVENTIL;VENTOLIN;PROAIR) 108 (90 Base) MCG/ACT inhaler Inhale 2 puffs into the lungs every 4 hours as needed 3/2/22   Historical Provider, MD   nicotine (NICODERM CQ) 21 MG/24HR Place 1 patch onto the skin daily 6/22/22 8/3/22  Tyrone Watt MD       Future Appointments   Date Time Provider Blue Gimenez   11/14/2022 10:30 AM DO DYLON Cortez AMB   11/18/2022  1:00 PM HAWK Wright - JESSICA MAT [facility] AMB

## 2022-09-14 ENCOUNTER — CARE COORDINATION (OUTPATIENT)
Dept: CARE COORDINATION | Facility: CLINIC | Age: 77
End: 2022-09-14

## 2022-09-21 ENCOUNTER — CARE COORDINATION (OUTPATIENT)
Dept: CARE COORDINATION | Facility: CLINIC | Age: 77
End: 2022-09-21

## 2022-09-28 ENCOUNTER — CARE COORDINATION (OUTPATIENT)
Dept: CARE COORDINATION | Facility: CLINIC | Age: 77
End: 2022-09-28

## 2022-09-28 NOTE — CARE COORDINATION
Ambulatory Care Coordination Note  9/28/2022    ACC: Tiffanie Taveras, RN    Pt reports visit to Podiatrist, feet feel better. C/O redness to naval, denies any warmth, a little tender. Agreeable for this RN to call for PCP f/u. Appmt made for 10/4 at 11:00. Pt encouraged to go to Urgent Care/ ED if needed before next week. Offered patient enrollment in the Remote Patient Monitoring (RPM) program for in-home monitoring: NA. Lab Results       None            Care Coordination Interventions    Referral from Primary Care Provider: No  Suggested Interventions and Community Resources  Social Work: In Process          Goals Addressed                   This Visit's Progress     Care Coordination Self Management   On track     CC Self Management Goal  Patient Goal   Patient is able to discuss self-management of condition(s): CHF, DM, CKD, GERD  Pt demonstrates adherence to medications  Pt demonstrates understanding of self-monitoring  Patient is able to identify Red Flags:  Alert to potential adverse drug reactions(s) or side effects and actions to take should they arise  Discuss target symptoms and actions to take should they arise  Identify problems that require immediate PCP or specialist visit  Patient demonstrates understanding of access to PCP/Specialist:  Understands about scheduling routine Follow Up appointments   Understands about sick day appointment options for worsening of symptoms/progression (Same Day, E Visits)              Prior to Admission medications    Medication Sig Start Date End Date Taking? Authorizing Provider   lisinopril (PRINIVIL;ZESTRIL) 40 MG tablet TAKE 1 TABLET BY MOUTH DAILY 8/22/22   Alberto Herrera MD   gabapentin (NEURONTIN) 300 MG capsule Take 1 capsule by mouth in the morning and at bedtime for 60 days. 7/15/22 9/13/22  HAWK Lyon CNP   HYDROcodone-acetaminophen (NORCO)  MG per tablet Take 1 tablet by mouth every 8 hours as needed.  6/29/21   Historical Provider, MD   aspirin 81 MG chewable tablet Take 81 mg by mouth daily 4/4/22   Historical Provider, MD   carvedilol (COREG) 25 MG tablet Take 25 mg by mouth 2 times daily 5/11/22   Historical Provider, MD   glipiZIDE (GLUCOTROL) 5 MG tablet Take 5 mg by mouth daily 3/2/22   Historical Provider, MD   torsemide (DEMADEX) 20 MG tablet Take 20 mg by mouth 2 times daily 5/2/22   Historical Provider, MD   FEROSUL 325 (65 Fe) MG tablet TAKE 1 TABLET BY MOUTH DAILY WITH BREAKFAST 4/4/22   Historical Provider, MD   atorvastatin (LIPITOR) 40 MG tablet Take 40 mg by mouth daily    Historical Provider, MD   pantoprazole (PROTONIX) 40 MG injection Infuse 40 mg intravenously daily    Historical Provider, MD   tamsulosin (FLOMAX) 0.4 MG capsule Take 0.4 mg by mouth daily 8/21/21   Historical Provider, MD   potassium chloride 20 MEQ/15ML (10%) oral solution Take 20 mEq by mouth daily    Historical Provider, MD   albuterol-ipratropium (COMBIVENT RESPIMAT)  MCG/ACT AERS inhaler Inhale 1 puff into the lungs    Historical Provider, MD   SYMBICORT 80-4.5 MCG/ACT AERO INHALE 2 PUFFS BY MOUTH TWICE DAILY 4/4/22   Historical Provider, MD   allopurinol (ZYLOPRIM) 300 MG tablet TAKE 1 TABLET BY MOUTH DAILY 6/9/22   Historical Provider, MD   albuterol sulfate HFA (PROVENTIL;VENTOLIN;PROAIR) 108 (90 Base) MCG/ACT inhaler Inhale 2 puffs into the lungs every 4 hours as needed 3/2/22   Historical Provider, MD   nicotine (NICODERM CQ) 21 MG/24HR Place 1 patch onto the skin daily 6/22/22 8/3/22  Faina Tyler MD       Future Appointments   Date Time Provider Blue Gimenez   11/14/2022 10:30 AM DO ADDIS Perez [facility] AMB   11/18/2022  1:00 PM Oanh Gallegos, APRN - JESSICA [facility] AMB

## 2022-10-04 ENCOUNTER — OFFICE VISIT (OUTPATIENT)
Dept: INTERNAL MEDICINE CLINIC | Facility: CLINIC | Age: 77
End: 2022-10-04
Payer: MEDICARE

## 2022-10-04 VITALS
SYSTOLIC BLOOD PRESSURE: 130 MMHG | DIASTOLIC BLOOD PRESSURE: 60 MMHG | TEMPERATURE: 97 F | HEART RATE: 87 BPM | RESPIRATION RATE: 16 BRPM | HEIGHT: 69 IN | WEIGHT: 124 LBS | OXYGEN SATURATION: 98 % | BODY MASS INDEX: 18.37 KG/M2

## 2022-10-04 DIAGNOSIS — E11.59 TYPE 2 DIABETES MELLITUS WITH OTHER CIRCULATORY COMPLICATION, WITHOUT LONG-TERM CURRENT USE OF INSULIN (HCC): ICD-10-CM

## 2022-10-04 DIAGNOSIS — B37.9 CANDIDIASIS: Primary | ICD-10-CM

## 2022-10-04 DIAGNOSIS — I10 ESSENTIAL HYPERTENSION: Primary | ICD-10-CM

## 2022-10-04 DIAGNOSIS — E78.2 MIXED HYPERLIPIDEMIA: ICD-10-CM

## 2022-10-04 PROCEDURE — 99213 OFFICE O/P EST LOW 20 MIN: CPT | Performed by: NURSE PRACTITIONER

## 2022-10-04 PROCEDURE — 1123F ACP DISCUSS/DSCN MKR DOCD: CPT | Performed by: NURSE PRACTITIONER

## 2022-10-04 RX ORDER — GLIPIZIDE 5 MG/1
5 TABLET ORAL DAILY
Qty: 30 TABLET | Refills: 0 | Status: SHIPPED | OUTPATIENT
Start: 2022-10-04

## 2022-10-04 RX ORDER — CLOTRIMAZOLE 1 %
CREAM (GRAM) TOPICAL
Qty: 28 G | Refills: 0 | Status: SHIPPED | OUTPATIENT
Start: 2022-10-04 | End: 2022-10-11

## 2022-10-04 RX ORDER — ALLOPURINOL 300 MG/1
TABLET ORAL
Qty: 30 TABLET | Refills: 0 | Status: SHIPPED | OUTPATIENT
Start: 2022-10-04

## 2022-10-04 RX ORDER — FERROUS SULFATE 325(65) MG
TABLET ORAL
Qty: 30 TABLET | Refills: 0 | Status: SHIPPED | OUTPATIENT
Start: 2022-10-04

## 2022-10-04 ASSESSMENT — ENCOUNTER SYMPTOMS
ABDOMINAL DISTENTION: 0
ABDOMINAL PAIN: 0
COLOR CHANGE: 1

## 2022-10-04 ASSESSMENT — PATIENT HEALTH QUESTIONNAIRE - PHQ9
1. LITTLE INTEREST OR PLEASURE IN DOING THINGS: 0
SUM OF ALL RESPONSES TO PHQ QUESTIONS 1-9: 0
SUM OF ALL RESPONSES TO PHQ9 QUESTIONS 1 & 2: 0
2. FEELING DOWN, DEPRESSED OR HOPELESS: 0

## 2022-10-04 NOTE — PROGRESS NOTES
[facility]      10/4/2022    Patient Name: Charmayne Mackie  :  1945      Chief Complaint:  Chief Complaint   Patient presents with    Other     Redness near navel         HPI  Patient presents today with complaint of umbilical itching. Symptoms started 2-3 months ago. He reports associated redness around and inside the umbilicus at one time but says that it has since resolved. He also reports a foul odor that he believes to be coming from the umbilicus. Denies any pain at the site, swelling or drainage. He has been washing with soap and water but denies any improvement. Past Medical History:   Diagnosis Date    BPH (benign prostatic hyperplasia)     CHF (NYHA class III, ACC/AHA stage C) ([facility])     Chronic kidney disease     CKD (chronic kidney disease)     COPD (chronic obstructive pulmonary disease) ([facility])     Coronary atherosclerosis of native coronary artery     Diabetes mellitus with circulatory complication ([facility])     DM neuropathies ([facility])     Dyslipidemia     GERD (gastroesophageal reflux disease)     Hyperlipidemia     Hypertension     Ischemic cardiomyopathy     Mitral regurgitation     PVD (peripheral vascular disease) ([facility])        Past Surgical History:   Procedure Laterality Date    CAROTID ENDARTERECTOMY      CORONARY ARTERY BYPASS GRAFT         Family History   Problem Relation Age of Onset    Diabetes Mother     Diabetes Brother     Hypertension Brother     Heart Attack Father        Social History     Tobacco Use    Smoking status: Every Day     Packs/day: 0.60     Types: Cigarettes    Smokeless tobacco: Never    Tobacco comments:     Quit smoking: almost a pack a day now, been smoking since 24years old   Vaping Use    Vaping Use: Never used   Substance Use Topics    Alcohol use:  Yes     Alcohol/week: 1.0 standard drink    Drug use: No       Current Outpatient Medications:     allopurinol (ZYLOPRIM) 300 MG tablet, TAKE 1 TABLET BY MOUTH DAILY, Disp: 30 tablet, Rfl: 0    glipiZIDE (GLUCOTROL) 5 MG tablet, Take 1 tablet by mouth daily, Disp: 30 tablet, Rfl: 0    FEROSUL 325 (65 Fe) MG tablet, TAKE 1 TABLET BY MOUTH DAILY WITH BREAKFAST, Disp: 30 tablet, Rfl: 0    clotrimazole (LOTRIMIN AF) 1 % cream, Apply topically 2 times daily. , Disp: 28 g, Rfl: 0    lisinopril (PRINIVIL;ZESTRIL) 40 MG tablet, TAKE 1 TABLET BY MOUTH DAILY, Disp: 90 tablet, Rfl: 3    HYDROcodone-acetaminophen (NORCO)  MG per tablet, Take 1 tablet by mouth every 8 hours as needed. , Disp: , Rfl:     aspirin 81 MG chewable tablet, Take 81 mg by mouth daily, Disp: , Rfl:     carvedilol (COREG) 25 MG tablet, Take 25 mg by mouth 2 times daily, Disp: , Rfl:     torsemide (DEMADEX) 20 MG tablet, Take 20 mg by mouth 2 times daily, Disp: , Rfl:     atorvastatin (LIPITOR) 40 MG tablet, Take 40 mg by mouth daily, Disp: , Rfl:     pantoprazole (PROTONIX) 40 MG injection, Infuse 40 mg intravenously daily, Disp: , Rfl:     tamsulosin (FLOMAX) 0.4 MG capsule, Take 0.4 mg by mouth daily, Disp: , Rfl:     potassium chloride 20 MEQ/15ML (10%) oral solution, Take 20 mEq by mouth daily, Disp: , Rfl:     albuterol-ipratropium (COMBIVENT RESPIMAT)  MCG/ACT AERS inhaler, Inhale 1 puff into the lungs, Disp: , Rfl:     SYMBICORT 80-4.5 MCG/ACT AERO, INHALE 2 PUFFS BY MOUTH TWICE DAILY, Disp: , Rfl:     albuterol sulfate HFA (PROVENTIL;VENTOLIN;PROAIR) 108 (90 Base) MCG/ACT inhaler, Inhale 2 puffs into the lungs every 4 hours as needed, Disp: , Rfl:     gabapentin (NEURONTIN) 300 MG capsule, Take 1 capsule by mouth in the morning and at bedtime for 60 days. , Disp: 60 capsule, Rfl: 1    nicotine (NICODERM CQ) 21 MG/24HR, Place 1 patch onto the skin daily, Disp: 42 patch, Rfl: 0    Allergies   Allergen Reactions    Iohexol Hives    Other Hives    Iodine Rash       Review of Systems   Constitutional:  Negative for chills and fever. Gastrointestinal:  Negative for abdominal distention and abdominal pain.    Skin:  Positive for color change and rash. Objective:  /60 (Site: Left Upper Arm, Position: Sitting, Cuff Size: Medium Adult)   Pulse 87   Temp 97 °F (36.1 °C) (Temporal)   Resp 16   Ht 5' 9\" (1.753 m)   Wt 124 lb (56.2 kg)   SpO2 98%   BMI 18.31 kg/m²     Examination:  Physical Exam  Vitals and nursing note reviewed. Constitutional:       General: He is not in acute distress. Appearance: Normal appearance. Cardiovascular:      Rate and Rhythm: Normal rate and regular rhythm. Heart sounds: Normal heart sounds. Pulmonary:      Effort: Pulmonary effort is normal. No respiratory distress. Breath sounds: Normal breath sounds. Abdominal:      General: Bowel sounds are normal.      Palpations: Abdomen is soft. There is no mass. Tenderness: There is no abdominal tenderness. Hernia: No hernia is present. There is no hernia in the umbilical area. Skin:     General: Skin is warm and dry. Neurological:      Mental Status: He is alert and oriented to person, place, and time. Psychiatric:         Mood and Affect: Mood normal.         Assessment/Plan:    Nahomy Lopez was seen today for other. Diagnoses and all orders for this visit:    Candidiasis  -     clotrimazole (LOTRIMIN AF) 1 % cream; Apply topically 2 times daily. Treat for possible fungal etiology. Apply as directed. Patient scheduled for return visit for routine follow-up in 3 weeks. Will reevaluate at that time. Instructed to return sooner if symptoms change or worsen. Follow-up and Dispositions    Return if symptoms worsen or fail to improve. On this date, 10/4/22, I have spent 25 minutes reviewing previous notes, test results and face to face with the patient discussing the diagnosis and importance of compliance with the treatment plan as well as documenting on the day of the visit. An electronic signature was used to authenticate this note.   ARJUN Melo

## 2022-10-05 ENCOUNTER — CARE COORDINATION (OUTPATIENT)
Dept: CARE COORDINATION | Facility: CLINIC | Age: 77
End: 2022-10-05

## 2022-10-05 NOTE — CARE COORDINATION
Pt reports that he has made copies of his insurance card and ID. ALBERT WESTBROOK and pt discussed what else he will need to complete application. Pt also requested another copy of the application. ALBERT WESTBROOK will mail carlos to pt this week and reach out early next week to confirm receipt and answer any questions.

## 2022-10-05 NOTE — CARE COORDINATION
Ambulatory Care Coordination Note  10/5/2022    ACC: Jenny Herbert, RN    Reviewed PCP visit yesterday, picked up RX cream, discussed keeping area dry, open to air as able. Let PCP know if does not improve. Discussed Medicaid carlos, also plans to apply for food stamps. Message to Williams Garcia per pt request to call pt. Aware of upcoming appmts. Offered patient enrollment in the Remote Patient Monitoring (RPM) program for in-home monitoring: NA. Lab Results       None            Care Coordination Interventions    Referral from Primary Care Provider: No  Suggested Interventions and Community Resources  Social Work: In Process          Goals Addressed                   This Visit's Progress     Care Coordination Self Management   On track     CC Self Management Goal  Patient Goal   Patient is able to discuss self-management of condition(s): CHF, DM, CKD, GERD  Pt demonstrates adherence to medications  Pt demonstrates understanding of self-monitoring  Patient is able to identify Red Flags:  Alert to potential adverse drug reactions(s) or side effects and actions to take should they arise  Discuss target symptoms and actions to take should they arise  Identify problems that require immediate PCP or specialist visit  Patient demonstrates understanding of access to PCP/Specialist:  Understands about scheduling routine Follow Up appointments   Understands about sick day appointment options for worsening of symptoms/progression (Same Day, E Visits)              Prior to Admission medications    Medication Sig Start Date End Date Taking?  Authorizing Provider   allopurinol (ZYLOPRIM) 300 MG tablet TAKE 1 TABLET BY MOUTH DAILY 10/4/22   HAWK Burgos CNP   glipiZIDE (GLUCOTROL) 5 MG tablet Take 1 tablet by mouth daily 10/4/22   HAWK Burgos CNP   FEROSUL 325 (65 Fe) MG tablet TAKE 1 TABLET BY MOUTH DAILY WITH BREAKFAST 10/4/22   HAWK Burgos CNP   clotrimazole (LOTRIMIN AF) 1 % cream Apply topically 2 times daily. 10/4/22 10/11/22  HAWK Causey CNP   lisinopril (PRINIVIL;ZESTRIL) 40 MG tablet TAKE 1 TABLET BY MOUTH DAILY 8/22/22   Isis Burnett MD   gabapentin (NEURONTIN) 300 MG capsule Take 1 capsule by mouth in the morning and at bedtime for 60 days. 7/15/22 9/13/22  HAWK Lyon CNP   HYDROcodone-acetaminophen (NORCO)  MG per tablet Take 1 tablet by mouth every 8 hours as needed.  6/29/21   Historical Provider, MD   aspirin 81 MG chewable tablet Take 81 mg by mouth daily 4/4/22   Historical Provider, MD   carvedilol (COREG) 25 MG tablet Take 25 mg by mouth 2 times daily 5/11/22   Historical Provider, MD   torsemide (DEMADEX) 20 MG tablet Take 20 mg by mouth 2 times daily 5/2/22   Historical Provider, MD   atorvastatin (LIPITOR) 40 MG tablet Take 40 mg by mouth daily    Historical Provider, MD   pantoprazole (PROTONIX) 40 MG injection Infuse 40 mg intravenously daily    Historical Provider, MD   tamsulosin (FLOMAX) 0.4 MG capsule Take 0.4 mg by mouth daily 8/21/21   Historical Provider, MD   potassium chloride 20 MEQ/15ML (10%) oral solution Take 20 mEq by mouth daily    Historical Provider, MD   albuterol-ipratropium (COMBIVENT RESPIMAT)  MCG/ACT AERS inhaler Inhale 1 puff into the lungs    Historical Provider, MD   SYMBICORT 80-4.5 MCG/ACT AERO INHALE 2 PUFFS BY MOUTH TWICE DAILY 4/4/22   Historical Provider, MD   albuterol sulfate HFA (PROVENTIL;VENTOLIN;PROAIR) 108 (90 Base) MCG/ACT inhaler Inhale 2 puffs into the lungs every 4 hours as needed 3/2/22   Historical Provider, MD   nicotine (NICODERM CQ) 21 MG/24HR Place 1 patch onto the skin daily 6/22/22 8/3/22  Gee Arcos MD       Future Appointments   Date Time Provider Blue Gimenez   10/19/2022 10:25 AM MAT LAB [facility] AMB   10/26/2022  1:30 PM HAWK Lyon CNP [facility] AMB   11/14/2022 10:30 AM Blaine Rosado DO [facility] AMB

## 2022-10-13 ENCOUNTER — CARE COORDINATION (OUTPATIENT)
Dept: CARE COORDINATION | Facility: CLINIC | Age: 77
End: 2022-10-13

## 2022-10-13 NOTE — CARE COORDINATION
Pt did not receive Medicaid application. Another application put in the mail today. Will confirm receipt early next week.

## 2022-10-19 ENCOUNTER — CARE COORDINATION (OUTPATIENT)
Dept: CARE COORDINATION | Facility: CLINIC | Age: 77
End: 2022-10-19

## 2022-10-19 NOTE — CARE COORDINATION
Ambulatory Care Coordination Note  10/19/2022    ACC: Waleska Earl, RN    Pt reports feeling ok, will not make it to Lab draw appmt this am as his truck won't start. Plan to call and reschedule. Working w/ [facility] to submit Medicaid carlos - has questions about Food [place]- encouraged to call her about that. Call back from pt -he  reports just spoke w/ [facility] about the Medicaid carlos and Food stamps, wishes to also discuss that his entire income goes to rent each month - encouraged to call [facility] about housing, possible carlos for subsidized housing. Offered patient enrollment in the Remote Patient Monitoring (RPM) program for in-home monitoring: NA. Lab Results       None            Care Coordination Interventions    Referral from Primary Care Provider: No  Suggested Interventions and Community Resources  Social Work: In Process          Goals Addressed                   This Visit's Progress     Care Coordination Self Management   On track     CC Self Management Goal  Patient Goal   Patient is able to discuss self-management of condition(s): CHF, DM, CKD, GERD  Pt demonstrates adherence to medications  Pt demonstrates understanding of self-monitoring  Patient is able to identify Red Flags:  Alert to potential adverse drug reactions(s) or side effects and actions to take should they arise  Discuss target symptoms and actions to take should they arise  Identify problems that require immediate PCP or specialist visit  Patient demonstrates understanding of access to PCP/Specialist:  Understands about scheduling routine Follow Up appointments   Understands about sick day appointment options for worsening of symptoms/progression (Same Day, E Visits)              Prior to Admission medications    Medication Sig Start Date End Date Taking?  Authorizing Provider   allopurinol (ZYLOPRIM) 300 MG tablet TAKE 1 TABLET BY MOUTH DAILY 10/4/22   Emmanuel Salcido APRN - CNP   glipiZIDE (GLUCOTROL) 5 MG tablet Take 1 tablet by mouth daily 10/4/22   HAWK Bingham CNP   FEROSUL 325 (65 Fe) MG tablet TAKE 1 TABLET BY MOUTH DAILY WITH BREAKFAST 10/4/22   HAWK Bingham CNP   lisinopril (PRINIVIL;ZESTRIL) 40 MG tablet TAKE 1 TABLET BY MOUTH DAILY 8/22/22   Francheska Spaulding MD   gabapentin (NEURONTIN) 300 MG capsule Take 1 capsule by mouth in the morning and at bedtime for 60 days. 7/15/22 9/13/22  HAWK Lyon CNP   HYDROcodone-acetaminophen (NORCO)  MG per tablet Take 1 tablet by mouth every 8 hours as needed.  6/29/21   Historical Provider, MD   aspirin 81 MG chewable tablet Take 81 mg by mouth daily 4/4/22   Historical Provider, MD   carvedilol (COREG) 25 MG tablet Take 25 mg by mouth 2 times daily 5/11/22   Historical Provider, MD   torsemide (DEMADEX) 20 MG tablet Take 20 mg by mouth 2 times daily 5/2/22   Historical Provider, MD   atorvastatin (LIPITOR) 40 MG tablet Take 40 mg by mouth daily    Historical Provider, MD   pantoprazole (PROTONIX) 40 MG injection Infuse 40 mg intravenously daily    Historical Provider, MD   tamsulosin (FLOMAX) 0.4 MG capsule Take 0.4 mg by mouth daily 8/21/21   Historical Provider, MD   potassium chloride 20 MEQ/15ML (10%) oral solution Take 20 mEq by mouth daily    Historical Provider, MD   albuterol-ipratropium (COMBIVENT RESPIMAT)  MCG/ACT AERS inhaler Inhale 1 puff into the lungs    Historical Provider, MD   SYMBICORT 80-4.5 MCG/ACT AERO INHALE 2 PUFFS BY MOUTH TWICE DAILY 4/4/22   Historical Provider, MD   albuterol sulfate HFA (PROVENTIL;VENTOLIN;PROAIR) 108 (90 Base) MCG/ACT inhaler Inhale 2 puffs into the lungs every 4 hours as needed 3/2/22   Historical Provider, MD   nicotine (NICODERM CQ) 21 MG/24HR Place 1 patch onto the skin daily 6/22/22 8/3/22  Ally Garza MD       Future Appointments   Date Time Provider Blue Gimenez   10/19/2022 10:25 AM MAT LAB [facility] AM[facility]   10/26/2022  1:30 PM HAWK Serrano - CNP MAT [facility]   11/14/2022 10:30 AM Matt Corado Jackeline Lucas, DO [facility] AMB

## 2022-10-21 ENCOUNTER — NURSE ONLY (OUTPATIENT)
Dept: INTERNAL MEDICINE CLINIC | Facility: CLINIC | Age: 77
End: 2022-10-21

## 2022-10-21 DIAGNOSIS — E78.2 MIXED HYPERLIPIDEMIA: ICD-10-CM

## 2022-10-21 DIAGNOSIS — E11.59 TYPE 2 DIABETES MELLITUS WITH OTHER CIRCULATORY COMPLICATION, WITHOUT LONG-TERM CURRENT USE OF INSULIN (HCC): ICD-10-CM

## 2022-10-21 DIAGNOSIS — I10 ESSENTIAL HYPERTENSION: ICD-10-CM

## 2022-10-21 LAB
APPEARANCE UR: CLEAR
BACTERIA URNS QL MICRO: ABNORMAL /HPF
BASOPHILS # BLD: 0 K/UL (ref 0–0.2)
BASOPHILS NFR BLD: 1 % (ref 0–2)
BILIRUB UR QL: NEGATIVE
CASTS URNS QL MICRO: ABNORMAL /LPF
COLOR UR: ABNORMAL
DIFFERENTIAL METHOD BLD: ABNORMAL
EOSINOPHIL # BLD: 0.2 K/UL (ref 0–0.8)
EOSINOPHIL NFR BLD: 3 % (ref 0.5–7.8)
EPI CELLS #/AREA URNS HPF: ABNORMAL /HPF
ERYTHROCYTE [DISTWIDTH] IN BLOOD BY AUTOMATED COUNT: 17.7 % (ref 11.9–14.6)
GLUCOSE UR STRIP.AUTO-MCNC: NEGATIVE MG/DL
HCT VFR BLD AUTO: 32.9 % (ref 41.1–50.3)
HGB BLD-MCNC: 10.1 G/DL (ref 13.6–17.2)
HGB UR QL STRIP: NEGATIVE
IMM GRANULOCYTES # BLD AUTO: 0 K/UL (ref 0–0.5)
IMM GRANULOCYTES NFR BLD AUTO: 0 % (ref 0–5)
KETONES UR QL STRIP.AUTO: NEGATIVE MG/DL
LEUKOCYTE ESTERASE UR QL STRIP.AUTO: NEGATIVE
LYMPHOCYTES # BLD: 1.1 K/UL (ref 0.5–4.6)
LYMPHOCYTES NFR BLD: 21 % (ref 13–44)
MCH RBC QN AUTO: 31.7 PG (ref 26.1–32.9)
MCHC RBC AUTO-ENTMCNC: 30.7 G/DL (ref 31.4–35)
MCV RBC AUTO: 103.1 FL (ref 82–102)
MONOCYTES # BLD: 0.4 K/UL (ref 0.1–1.3)
MONOCYTES NFR BLD: 9 % (ref 4–12)
MUCOUS THREADS URNS QL MICRO: 0 /LPF
NEUTS SEG # BLD: 3.3 K/UL (ref 1.7–8.2)
NEUTS SEG NFR BLD: 66 % (ref 43–78)
NITRITE UR QL STRIP.AUTO: NEGATIVE
NRBC # BLD: 0 K/UL (ref 0–0.2)
PH UR STRIP: 5 [PH] (ref 5–9)
PLATELET # BLD AUTO: 143 K/UL (ref 150–450)
PMV BLD AUTO: 11.1 FL (ref 9.4–12.3)
PROT UR STRIP-MCNC: NEGATIVE MG/DL
RBC # BLD AUTO: 3.19 M/UL (ref 4.23–5.6)
RBC #/AREA URNS HPF: ABNORMAL /HPF
SP GR UR REFRACTOMETRY: 1.01 (ref 1–1.02)
URINE CULTURE IF INDICATED: ABNORMAL
UROBILINOGEN UR QL STRIP.AUTO: 0.2 EU/DL (ref 0.2–1)
WBC # BLD AUTO: 5.1 K/UL (ref 4.3–11.1)
WBC URNS QL MICRO: ABNORMAL /HPF

## 2022-10-22 LAB
EST. AVERAGE GLUCOSE BLD GHB EST-MCNC: 137 MG/DL
HBA1C MFR BLD: 6.4 % (ref 4.8–5.6)

## 2022-10-23 LAB
ALBUMIN SERPL-MCNC: 3.7 G/DL (ref 3.2–4.6)
ALBUMIN/GLOB SERPL: 1.4 {RATIO} (ref 0.4–1.6)
ALP SERPL-CCNC: 118 U/L (ref 50–136)
ALT SERPL-CCNC: 29 U/L (ref 12–65)
ANION GAP SERPL CALC-SCNC: 6 MMOL/L (ref 2–11)
AST SERPL-CCNC: 32 U/L (ref 15–37)
BILIRUB SERPL-MCNC: 0.4 MG/DL (ref 0.2–1.1)
BUN SERPL-MCNC: 37 MG/DL (ref 8–23)
CALCIUM SERPL-MCNC: 8.4 MG/DL (ref 8.3–10.4)
CHLORIDE SERPL-SCNC: 116 MMOL/L (ref 101–110)
CHOLEST SERPL-MCNC: 104 MG/DL
CO2 SERPL-SCNC: 19 MMOL/L (ref 21–32)
CREAT SERPL-MCNC: 1.7 MG/DL (ref 0.8–1.5)
GLOBULIN SER CALC-MCNC: 2.7 G/DL (ref 2.8–4.5)
GLUCOSE SERPL-MCNC: 115 MG/DL (ref 65–100)
HDLC SERPL-MCNC: 48 MG/DL (ref 40–60)
HDLC SERPL: 2.2 {RATIO}
LDLC SERPL CALC-MCNC: 45 MG/DL
POTASSIUM SERPL-SCNC: 4.9 MMOL/L (ref 3.5–5.1)
PROT SERPL-MCNC: 6.4 G/DL (ref 6.3–8.2)
SODIUM SERPL-SCNC: 141 MMOL/L (ref 133–143)
TRIGL SERPL-MCNC: 55 MG/DL (ref 35–150)
TSH, 3RD GENERATION: 4.39 UIU/ML (ref 0.36–3.74)
VLDLC SERPL CALC-MCNC: 11 MG/DL (ref 6–23)

## 2022-10-26 ENCOUNTER — OFFICE VISIT (OUTPATIENT)
Dept: INTERNAL MEDICINE CLINIC | Facility: CLINIC | Age: 77
End: 2022-10-26
Payer: MEDICARE

## 2022-10-26 VITALS
SYSTOLIC BLOOD PRESSURE: 125 MMHG | BODY MASS INDEX: 19.11 KG/M2 | OXYGEN SATURATION: 98 % | WEIGHT: 129 LBS | TEMPERATURE: 97.4 F | HEIGHT: 69 IN | DIASTOLIC BLOOD PRESSURE: 60 MMHG | HEART RATE: 88 BPM

## 2022-10-26 DIAGNOSIS — I10 ESSENTIAL HYPERTENSION: ICD-10-CM

## 2022-10-26 DIAGNOSIS — J41.0 SMOKERS' COUGH (HCC): ICD-10-CM

## 2022-10-26 DIAGNOSIS — E11.59 TYPE 2 DIABETES MELLITUS WITH OTHER CIRCULATORY COMPLICATION, WITHOUT LONG-TERM CURRENT USE OF INSULIN (HCC): ICD-10-CM

## 2022-10-26 DIAGNOSIS — Z72.0 TOBACCO ABUSE: ICD-10-CM

## 2022-10-26 DIAGNOSIS — E78.5 DYSLIPIDEMIA: ICD-10-CM

## 2022-10-26 DIAGNOSIS — D50.8 IRON DEFICIENCY ANEMIA DUE TO DIETARY CAUSES: ICD-10-CM

## 2022-10-26 DIAGNOSIS — R79.89 ELEVATED SERUM CREATININE: ICD-10-CM

## 2022-10-26 DIAGNOSIS — Z00.00 MEDICARE ANNUAL WELLNESS VISIT, SUBSEQUENT: Primary | ICD-10-CM

## 2022-10-26 PROCEDURE — 99214 OFFICE O/P EST MOD 30 MIN: CPT | Performed by: NURSE PRACTITIONER

## 2022-10-26 PROCEDURE — 3078F DIAST BP <80 MM HG: CPT | Performed by: NURSE PRACTITIONER

## 2022-10-26 PROCEDURE — 3074F SYST BP LT 130 MM HG: CPT | Performed by: NURSE PRACTITIONER

## 2022-10-26 PROCEDURE — G0439 PPPS, SUBSEQ VISIT: HCPCS | Performed by: NURSE PRACTITIONER

## 2022-10-26 PROCEDURE — 3044F HG A1C LEVEL LT 7.0%: CPT | Performed by: NURSE PRACTITIONER

## 2022-10-26 PROCEDURE — 1123F ACP DISCUSS/DSCN MKR DOCD: CPT | Performed by: NURSE PRACTITIONER

## 2022-10-26 RX ORDER — GUAIFENESIN 600 MG/1
600 TABLET, EXTENDED RELEASE ORAL 2 TIMES DAILY
Qty: 30 TABLET | Refills: 0 | Status: SHIPPED | OUTPATIENT
Start: 2022-10-26 | End: 2022-11-10

## 2022-10-26 RX ORDER — CLOPIDOGREL BISULFATE 75 MG/1
TABLET ORAL
COMMUNITY

## 2022-10-26 ASSESSMENT — ENCOUNTER SYMPTOMS
EYE DISCHARGE: 0
COUGH: 0
APNEA: 0
ABDOMINAL PAIN: 0
SINUS PAIN: 0
ABDOMINAL DISTENTION: 0
SHORTNESS OF BREATH: 0
BACK PAIN: 0
EYE ITCHING: 0
COLOR CHANGE: 0
EYE REDNESS: 0
CONSTIPATION: 0
DIARRHEA: 0
NAUSEA: 0
EYE PAIN: 0

## 2022-10-26 ASSESSMENT — LIFESTYLE VARIABLES
HOW OFTEN DURING THE LAST YEAR HAVE YOU BEEN UNABLE TO REMEMBER WHAT HAPPENED THE NIGHT BEFORE BECAUSE YOU HAD BEEN DRINKING: 0
HOW MANY STANDARD DRINKS CONTAINING ALCOHOL DO YOU HAVE ON A TYPICAL DAY: 3 OR 4
HOW OFTEN DO YOU HAVE A DRINK CONTAINING ALCOHOL: 2-3 TIMES A WEEK
HAVE YOU OR SOMEONE ELSE BEEN INJURED AS A RESULT OF YOUR DRINKING: 0
HAS A RELATIVE, FRIEND, DOCTOR, OR ANOTHER HEALTH PROFESSIONAL EXPRESSED CONCERN ABOUT YOUR DRINKING OR SUGGESTED YOU CUT DOWN: 0
HOW OFTEN DURING THE LAST YEAR HAVE YOU HAD A FEELING OF GUILT OR REMORSE AFTER DRINKING: 0
HOW OFTEN DURING THE LAST YEAR HAVE YOU FAILED TO DO WHAT WAS NORMALLY EXPECTED FROM YOU BECAUSE OF DRINKING: 0
HOW OFTEN DURING THE LAST YEAR HAVE YOU NEEDED AN ALCOHOLIC DRINK FIRST THING IN THE MORNING TO GET YOURSELF GOING AFTER A NIGHT OF HEAVY DRINKING: 0
HOW OFTEN DURING THE LAST YEAR HAVE YOU FOUND THAT YOU WERE NOT ABLE TO STOP DRINKING ONCE YOU HAD STARTED: 0

## 2022-10-26 ASSESSMENT — PATIENT HEALTH QUESTIONNAIRE - PHQ9
SUM OF ALL RESPONSES TO PHQ QUESTIONS 1-9: 0
1. LITTLE INTEREST OR PLEASURE IN DOING THINGS: 0
SUM OF ALL RESPONSES TO PHQ9 QUESTIONS 1 & 2: 0
SUM OF ALL RESPONSES TO PHQ QUESTIONS 1-9: 0
2. FEELING DOWN, DEPRESSED OR HOPELESS: 0

## 2022-10-26 NOTE — PROGRESS NOTES
[unfilled]  [address] [place] [id number]      PROGRESS NOTE    SUBJECTIVE:   Sharri Edmond is a 67 y.o. male seen for a follow up visit regarding the following chief complaint:     Chief Complaint   Patient presents with    Medicare AWV       HPI  Patient presents for AWV. Labs reviewed and discussed. HTN: She is compliant with current treatment regimen, denies any intolerance. She denies any headache, dizziness or edema. HL: stable on current regimen, denies any intolerance to lipitor and he is compliant with this. Diabetes: [id number] from previous. 6.4%  She is compliant with current medications and denies any intolerance. ACE/ARB-yes  Creatinine up to 1.7; he is taking demadex only once daily. Vaccines due. Cognitive screen normal.  He lives independently and uses a cane for ambulation. Following podiatry for corn removal on right foot. He is having some dry mouth at night and is concerned about this. He drinks water and this seems to help. Current Outpatient Medications   Medication Sig Dispense Refill    clopidogrel (PLAVIX) 75 MG tablet       guaiFENesin (MUCINEX) 600 MG extended release tablet Take 1 tablet by mouth 2 times daily for 15 days 30 tablet 0    allopurinol (ZYLOPRIM) 300 MG tablet TAKE 1 TABLET BY MOUTH DAILY 30 tablet 0    glipiZIDE (GLUCOTROL) 5 MG tablet Take 1 tablet by mouth daily 30 tablet 0    FEROSUL 325 (65 Fe) MG tablet TAKE 1 TABLET BY MOUTH DAILY WITH BREAKFAST 30 tablet 0    lisinopril (PRINIVIL;ZESTRIL) 40 MG tablet TAKE 1 TABLET BY MOUTH DAILY 90 tablet 3    HYDROcodone-acetaminophen (NORCO)  MG per tablet Take 1 tablet by mouth every 8 hours as needed.       aspirin 81 MG chewable tablet Take 81 mg by mouth daily      carvedilol (COREG) 25 MG tablet Take 25 mg by mouth 2 times daily      torsemide (DEMADEX) 20 MG tablet Take 20 mg by mouth 2 times daily      atorvastatin (LIPITOR) 40 MG tablet Take 40 mg by mouth daily pantoprazole (PROTONIX) 40 MG injection Infuse 40 mg intravenously daily Tablets not inj      tamsulosin (FLOMAX) 0.4 MG capsule Take 0.4 mg by mouth daily      potassium chloride 20 MEQ/15ML (10%) oral solution Take 20 mEq by mouth daily      albuterol-ipratropium (COMBIVENT RESPIMAT)  MCG/ACT AERS inhaler Inhale 1 puff into the lungs      SYMBICORT 80-4.5 MCG/ACT AERO INHALE 2 PUFFS BY MOUTH TWICE DAILY      albuterol sulfate HFA (PROVENTIL;VENTOLIN;PROAIR) 108 (90 Base) MCG/ACT inhaler Inhale 2 puffs into the lungs every 4 hours as needed      gabapentin (NEURONTIN) 300 MG capsule Take 1 capsule by mouth in the morning and at bedtime for 60 days. 60 capsule 1    nicotine (NICODERM CQ) 21 MG/24HR Place 1 patch onto the skin daily 42 patch 0     No current facility-administered medications for this visit.      Allergies   Allergen Reactions    Iohexol Hives    Other Hives    Iodine Rash       Past Medical History:   Diagnosis Date    BPH (benign prostatic hyperplasia)     CHF (NYHA class III, ACC/AHA stage C) ([facility])     Chronic kidney disease     CKD (chronic kidney disease)     COPD (chronic obstructive pulmonary disease) ([facility])     Coronary atherosclerosis of native coronary artery     Diabetes mellitus with circulatory complication ([facility])     DM neuropathies ([facility])     Dyslipidemia     GERD (gastroesophageal reflux disease)     Hyperlipidemia     Hypertension     Ischemic cardiomyopathy     Mitral regurgitation     PVD (peripheral vascular disease) ([facility])      Past Surgical History:   Procedure Laterality Date    CAROTID ENDARTERECTOMY      CORONARY ARTERY BYPASS GRAFT  2005     Family History   Problem Relation Age of Onset    Diabetes Mother     Diabetes Brother     Hypertension Brother     Heart Attack Father      Social History     Tobacco Use    Smoking status: Every Day     Packs/day: 0.60     Types: Cigarettes    Smokeless tobacco: Never    Tobacco comments:     Quit smoking: almost a pack a day now, been smoking since 24years old   Substance Use Topics    Alcohol use: Yes     Alcohol/week: 1.0 standard drink       Review of Systems   Constitutional:  Negative for activity change, appetite change, chills, fatigue and fever. HENT:  Negative for congestion, ear pain and sinus pain. Eyes:  Negative for pain, discharge, redness and itching. Respiratory:  Negative for apnea, cough and shortness of breath. Cardiovascular:  Negative for chest pain, palpitations and leg swelling. Gastrointestinal:  Negative for abdominal distention, abdominal pain, constipation, diarrhea and nausea. Endocrine: Negative for cold intolerance and heat intolerance. Genitourinary:  Negative for difficulty urinating, dysuria, enuresis and urgency. Musculoskeletal:  Negative for arthralgias, back pain, joint swelling, myalgias and neck pain. Skin:  Negative for color change and rash. Neurological:  Negative for dizziness, weakness and headaches. Psychiatric/Behavioral:  Negative for behavioral problems and sleep disturbance. The patient is not nervous/anxious. OBJECTIVE:  /60 (Site: Left Upper Arm, Position: Sitting, Cuff Size: Large Adult)   Pulse 88   Temp 97.4 °F (36.3 °C) (Temporal)   Ht 5' 9\" (1.753 m)   Wt 129 lb (58.5 kg)   SpO2 98%   BMI 19.05 kg/m²      Physical Exam  Constitutional:       General: He is not in acute distress. Appearance: Normal appearance. He is not ill-appearing or toxic-appearing. Cardiovascular:      Rate and Rhythm: Normal rate and regular rhythm. Pulmonary:      Effort: Pulmonary effort is normal. No respiratory distress. Skin:     General: Skin is warm and dry. Neurological:      Mental Status: He is alert. Mental status is at baseline. Psychiatric:         Mood and Affect: Mood normal.         Behavior: Behavior normal.         Thought Content: Thought content normal.         ASSESSMENT and PLAN  Radha Haro was seen today for medicare awv.     Diagnoses and all orders for this visit:    Medicare annual wellness visit, subsequent    Essential hypertension    Type 2 diabetes mellitus with other circulatory complication, without long-term current use of insulin (HCC)    Dyslipidemia    Tobacco abuse    Iron deficiency anemia due to dietary causes    Smokers' cough (HCC)  -     guaiFENesin (MUCINEX) 600 MG extended release tablet; Take 1 tablet by mouth 2 times daily for 15 days    Elevated serum creatinine  -     Comprehensive Metabolic Panel; Future    Annual Exam: Goals for good health were addressed at today's visit:  -Reach and stay at a healthy weight. This can lower your risk of obesity, diabetes, heart disease and high blood pressure. -Get at least 30 minutes of physical activity daily. Walking, running, swimming, cycling or playing sports are good options.  -Do not smoke or allow others to smoke around you. -Use sunscreen daily, SPF of 15 or higher are best.  -See a dentist at least once yearly for checkups. -Have vision checked every 1-2 years.  -Wear a seat belt in the car.  -Avoid drinking in excess of 2 alcoholic drinks per day for men and 1 drink a day for women, or avoid drinking altogether.   -Watch closely for changes in our health and contact your doctor with any concerning problems or symptoms  -Age appropriate screening tests were updated and discussed at today's visit. -Immunization history was reviewed and updated at today's visit. Follow-up and Dispositions    Return for 4 weeks lab only/ 6 months for labs/recheck.          Leonardo Shah NP, APRN - CNP

## 2022-11-02 ENCOUNTER — CARE COORDINATION (OUTPATIENT)
Dept: CARE COORDINATION | Facility: CLINIC | Age: 77
End: 2022-11-02

## 2022-11-02 ENCOUNTER — TELEPHONE (OUTPATIENT)
Dept: INTERNAL MEDICINE CLINIC | Facility: CLINIC | Age: 77
End: 2022-11-02

## 2022-11-02 NOTE — TELEPHONE ENCOUNTER
[facility] Transitions Initial Follow Up Call    Outreach made within 2 business days of discharge: Yes    Patient: Bhavik Mcdonald Patient : 1945   MRN: [id number]  Reason for Admission: Shortness of Breath  Discharge Date: 2022       Spoke with: Arsalan Quiros    Discharge department/facility: [facility] Interactive Patient Contact:  Was patient able to fill all prescriptions: Yes  Was patient instructed to bring all medications to the follow-up visit: Yes  Is patient taking all medications as directed in the discharge summary?  Yes  Does patient understand their discharge instructions: Yes  Does patient have questions or concerns that need addressed prior to 7-14 day follow up office visit: no    Scheduled appointment with PCP within 7-14 days    Follow Up  Future Appointments   Date Time Provider Blue Gimenez   2022 10:30 AM Teresa Torres DO [facility]   2022 11:00 AM HAWK Bell CNP [facility] AMB   2022  9:00 AM [facility] LAB DS [facility]   2023  9:00 AM [facility] LAB DS [facility]   5/3/2023 10:30 AM HAWK Bernstein - CNP MAT [facility] AMB       [place], [place]

## 2022-11-02 NOTE — CARE COORDINATION
Ambulatory Care Coordination Note  11/2/2022    ACC: Vandana Aburto, RN    Pt reports ED visit 10/31 for SOB/ chest pain - Rx'd Synthroid due to hypothyroidism - states compliance- agreeable for this RN to call for PCP f/u. Appmt made for 11/17 at 11:00. Encouraged compliance w/ diuretics, verbalizes understanding. Also reviewed upcoming Cardiology appmt - pt aware. Continues working w/ [employer] for [employer]. Offered patient enrollment in the Remote Patient Monitoring (RPM) program for in-home monitoring: NA. Lab Results       None            Care Coordination Interventions    Referral from Primary Care Provider: No  Suggested Interventions and Community Resources  Social Work: In Process          Goals Addressed                   This Visit's Progress     Care Coordination Self Management   On track     CC Self Management Goal  Patient Goal   Patient is able to discuss self-management of condition(s): CHF, DM, CKD, GERD  Pt demonstrates adherence to medications  Pt demonstrates understanding of self-monitoring  Patient is able to identify Red Flags:  Alert to potential adverse drug reactions(s) or side effects and actions to take should they arise  Discuss target symptoms and actions to take should they arise  Identify problems that require immediate PCP or specialist visit  Patient demonstrates understanding of access to PCP/Specialist:  Understands about scheduling routine Follow Up appointments   Understands about sick day appointment options for worsening of symptoms/progression (Same Day, E Visits)              Prior to Admission medications    Medication Sig Start Date End Date Taking?  Authorizing Provider   clopidogrel (PLAVIX) 75 MG tablet     Historical Provider, MD kingaiFENesin (MUCINEX) 600 MG extended release tablet Take 1 tablet by mouth 2 times daily for 15 days 10/26/22 11/10/22  HAWK Lyon - CNP   allopurinol (ZYLOPRIM) 300 MG tablet TAKE 1 TABLET BY MOUTH DAILY 10/4/22   HAWK Desai CNP   glipiZIDE (GLUCOTROL) 5 MG tablet Take 1 tablet by mouth daily 10/4/22   Choco Kendall, APRN - CNP   FEROSUL 325 (65 Fe) MG tablet TAKE 1 TABLET BY MOUTH DAILY WITH BREAKFAST 10/4/22   Choco Enoch, APRN - CNP   lisinopril (PRINIVIL;ZESTRIL) 40 MG tablet TAKE 1 TABLET BY MOUTH DAILY 8/22/22   Jenny Johnson MD   gabapentin (NEURONTIN) 300 MG capsule Take 1 capsule by mouth in the morning and at bedtime for 60 days. 7/15/22 9/13/22  HAWK Lyon CNP   HYDROcodone-acetaminophen (NORCO)  MG per tablet Take 1 tablet by mouth every 8 hours as needed.  6/29/21   Historical Provider, MD   aspirin 81 MG chewable tablet Take 81 mg by mouth daily 4/4/22   Historical Provider, MD   carvedilol (COREG) 25 MG tablet Take 25 mg by mouth 2 times daily 5/11/22   Historical Provider, MD   torsemide (DEMADEX) 20 MG tablet Take 20 mg by mouth 2 times daily 5/2/22   Historical Provider, MD   atorvastatin (LIPITOR) 40 MG tablet Take 40 mg by mouth daily    Historical Provider, MD   pantoprazole (PROTONIX) 40 MG injection Infuse 40 mg intravenously daily Tablets not inj    Historical Provider, MD   tamsulosin (FLOMAX) 0.4 MG capsule Take 0.4 mg by mouth daily 8/21/21   Historical Provider, MD   potassium chloride 20 MEQ/15ML (10%) oral solution Take 20 mEq by mouth daily    Historical Provider, MD   albuterol-ipratropium (COMBIVENT RESPIMAT)  MCG/ACT AERS inhaler Inhale 1 puff into the lungs    Historical Provider, MD   SYMBICORT 80-4.5 MCG/ACT AERO INHALE 2 PUFFS BY MOUTH TWICE DAILY 4/4/22   Historical Provider, MD   albuterol sulfate HFA (PROVENTIL;VENTOLIN;PROAIR) 108 (90 Base) MCG/ACT inhaler Inhale 2 puffs into the lungs every 4 hours as needed 3/2/22   Historical Provider, MD   nicotine (NICODERM CQ) 21 MG/24HR Place 1 patch onto the skin daily 6/22/22 8/3/22  Joanne Jeter MD       Future Appointments   Date Time Provider Blue Gimenez   11/14/2022 10:30 AM DO DYLON Unger [facility] AMB   11/28/2022  9:00 AM SFE LAB DS [facility] SFE   4/26/2023  9:00 AM HARRYE LAB DS [facility] SFE   5/3/2023 10:30 AM HAWK Lyon - JESSICA RUFF [facility] AMB

## 2022-11-09 ENCOUNTER — CARE COORDINATION (OUTPATIENT)
Dept: CARE COORDINATION | Facility: CLINIC | Age: 77
End: 2022-11-09

## 2022-11-09 RX ORDER — GLIPIZIDE 5 MG/1
5 TABLET ORAL DAILY
Qty: 90 TABLET | Refills: 1 | Status: SHIPPED | OUTPATIENT
Start: 2022-11-09

## 2022-11-09 RX ORDER — ATORVASTATIN CALCIUM 40 MG/1
40 TABLET, FILM COATED ORAL DAILY
Qty: 90 TABLET | Refills: 3 | Status: SHIPPED | OUTPATIENT
Start: 2022-11-09

## 2022-11-09 RX ORDER — TORSEMIDE 20 MG/1
20 TABLET ORAL DAILY
Qty: 90 TABLET | Refills: 3 | Status: SHIPPED | OUTPATIENT
Start: 2022-11-09

## 2022-11-09 NOTE — TELEPHONE ENCOUNTER
Home health calling to indicate patient is out of Torsemide and atorvastatin. Has a follow up appt on Monday but needs this asap.

## 2022-11-09 NOTE — CARE COORDINATION
Ambulatory Care Coordination Note  11/9/2022    ACC: Salty Wade, RN    Call from pt to report he needs refills on Glipizide, Atorvastatin and Torsemide. Reviewed upcoming appmts w/ Cardiology and PCP next week. Strongly encouraged pt to attend both appmts, discuss meds and future refills. Message to PCP office to refill Glipizide. Call to [facility] to request refills for Torsemide and Atorvastatin. Called pt back to update. Had to leave a message. Offered patient enrollment in the Remote Patient Monitoring (RPM) program for in-home monitoring: NA. Lab Results       None            Care Coordination Interventions    Referral from Primary Care Provider: No  Suggested Interventions and Community Resources  Social Work: In Process          Goals Addressed                   This Visit's Progress     Care Coordination Self Management   Worsening     CC Self Management Goal  Patient Goal   Patient is able to discuss self-management of condition(s): CHF, DM, CKD, GERD  Pt demonstrates adherence to medications  Pt demonstrates understanding of self-monitoring  Patient is able to identify Red Flags:  Alert to potential adverse drug reactions(s) or side effects and actions to take should they arise  Discuss target symptoms and actions to take should they arise  Identify problems that require immediate PCP or specialist visit  Patient demonstrates understanding of access to PCP/Specialist:  Understands about scheduling routine Follow Up appointments   Understands about sick day appointment options for worsening of symptoms/progression (Same Day, E Visits)              Prior to Admission medications    Medication Sig Start Date End Date Taking?  Authorizing Provider   glipiZIDE (GLUCOTROL) 5 MG tablet Take 1 tablet by mouth daily 11/9/22   Oanh Almanza, APRN - CNP   clopidogrel (PLAVIX) 75 MG tablet     Historical Provider, MD kingaiFENesin (MUCINEX) 600 MG extended release tablet Take 1 tablet by mouth 2 times daily for 15 days 10/26/22 11/10/22  [place] HAWK Shaw CNP   allopurinol (ZYLOPRIM) 300 MG tablet TAKE 1 TABLET BY MOUTH DAILY 10/4/22   HAWK Joseph CNP   FEROSUL 325 (65 Fe) MG tablet TAKE 1 TABLET BY MOUTH DAILY WITH BREAKFAST 10/4/22   Kristina Leonardshawanda, APRN - CNP   lisinopril (PRINIVIL;ZESTRIL) 40 MG tablet TAKE 1 TABLET BY MOUTH DAILY 8/22/22   Andres Marrero MD   gabapentin (NEURONTIN) 300 MG capsule Take 1 capsule by mouth in the morning and at bedtime for 60 days. 7/15/22 9/13/22  HAWK Lyon CNP   HYDROcodone-acetaminophen (NORCO)  MG per tablet Take 1 tablet by mouth every 8 hours as needed.  6/29/21   Historical Provider, MD   aspirin 81 MG chewable tablet Take 81 mg by mouth daily 4/4/22   Historical Provider, MD   carvedilol (COREG) 25 MG tablet Take 25 mg by mouth 2 times daily 5/11/22   Historical Provider, MD   torsemide (DEMADEX) 20 MG tablet Take 20 mg by mouth 2 times daily 5/2/22   Historical Provider, MD   atorvastatin (LIPITOR) 40 MG tablet Take 40 mg by mouth daily    Historical Provider, MD   pantoprazole (PROTONIX) 40 MG injection Infuse 40 mg intravenously daily Tablets not inj    Historical Provider, MD   tamsulosin (FLOMAX) 0.4 MG capsule Take 0.4 mg by mouth daily 8/21/21   Historical Provider, MD   potassium chloride 20 MEQ/15ML (10%) oral solution Take 20 mEq by mouth daily    Historical Provider, MD   albuterol-ipratropium (COMBIVENT RESPIMAT)  MCG/ACT AERS inhaler Inhale 1 puff into the lungs    Historical Provider, MD   SYMBICORT 80-4.5 MCG/ACT AERO INHALE 2 PUFFS BY MOUTH TWICE DAILY 4/4/22   Historical Provider, MD   albuterol sulfate HFA (PROVENTIL;VENTOLIN;PROAIR) 108 (90 Base) MCG/ACT inhaler Inhale 2 puffs into the lungs every 4 hours as needed 3/2/22   Historical Provider, MD   nicotine (NICODERM CQ) 21 MG/24HR Place 1 patch onto the skin daily 6/22/22 8/3/22  Betsy Pennington MD       Future Appointments   Date Time Provider Blue Gimenez   11/14/2022 10:30 AM DO DYLON Colon [facility] AMB   11/17/2022 11:00 AM HAWK Ralph CNP [facility] AMB   11/28/2022  9:00 AM SFE LAB DS [facility] SFE   4/26/2023  9:00 AM SFE LAB DS [facility]EDS SFE   5/3/2023 10:30 AM HAWK Lyon CNP [facility] AMB

## 2022-11-18 ENCOUNTER — CARE COORDINATION (OUTPATIENT)
Dept: CARE COORDINATION | Facility: CLINIC | Age: 77
End: 2022-11-18

## 2022-11-29 ENCOUNTER — OFFICE VISIT (OUTPATIENT)
Dept: INTERNAL MEDICINE CLINIC | Facility: CLINIC | Age: 77
End: 2022-11-29
Payer: MEDICARE

## 2022-11-29 ENCOUNTER — CARE COORDINATION (OUTPATIENT)
Dept: CARE COORDINATION | Facility: CLINIC | Age: 77
End: 2022-11-29

## 2022-11-29 VITALS
OXYGEN SATURATION: 95 % | HEIGHT: 69 IN | RESPIRATION RATE: 16 BRPM | WEIGHT: 122 LBS | HEART RATE: 74 BPM | DIASTOLIC BLOOD PRESSURE: 54 MMHG | SYSTOLIC BLOOD PRESSURE: 115 MMHG | TEMPERATURE: 98 F | BODY MASS INDEX: 18.07 KG/M2

## 2022-11-29 DIAGNOSIS — R79.89 ELEVATED BRAIN NATRIURETIC PEPTIDE (BNP) LEVEL: ICD-10-CM

## 2022-11-29 DIAGNOSIS — F17.200 SMOKER: ICD-10-CM

## 2022-11-29 DIAGNOSIS — R06.02 SHORTNESS OF BREATH: Primary | ICD-10-CM

## 2022-11-29 DIAGNOSIS — E03.8 SUBCLINICAL HYPOTHYROIDISM: ICD-10-CM

## 2022-11-29 PROCEDURE — 3074F SYST BP LT 130 MM HG: CPT | Performed by: NURSE PRACTITIONER

## 2022-11-29 PROCEDURE — 1123F ACP DISCUSS/DSCN MKR DOCD: CPT | Performed by: NURSE PRACTITIONER

## 2022-11-29 PROCEDURE — 3078F DIAST BP <80 MM HG: CPT | Performed by: NURSE PRACTITIONER

## 2022-11-29 PROCEDURE — 99214 OFFICE O/P EST MOD 30 MIN: CPT | Performed by: NURSE PRACTITIONER

## 2022-11-29 RX ORDER — LEVOTHYROXINE SODIUM 0.03 MG/1
25 TABLET ORAL DAILY
Qty: 90 TABLET | Refills: 1 | Status: CANCELLED | OUTPATIENT
Start: 2022-11-29

## 2022-11-29 RX ORDER — PANTOPRAZOLE SODIUM 40 MG/1
40 TABLET, DELAYED RELEASE ORAL
Qty: 90 TABLET | Refills: 1 | Status: SHIPPED | OUTPATIENT
Start: 2022-11-29

## 2022-11-29 RX ORDER — FERROUS SULFATE 325(65) MG
TABLET ORAL
Qty: 30 TABLET | Refills: 0 | Status: SHIPPED | OUTPATIENT
Start: 2022-11-29

## 2022-11-29 RX ORDER — PANTOPRAZOLE SODIUM 40 MG/1
40 TABLET, DELAYED RELEASE ORAL
Qty: 90 TABLET | Refills: 1 | Status: CANCELLED | OUTPATIENT
Start: 2022-11-29

## 2022-11-29 RX ORDER — LEVOTHYROXINE SODIUM 0.03 MG/1
25 TABLET ORAL DAILY
Qty: 90 TABLET | Refills: 1 | Status: SHIPPED | OUTPATIENT
Start: 2022-11-29

## 2022-11-29 RX ORDER — FERROUS SULFATE 325(65) MG
TABLET ORAL
Qty: 90 TABLET | Refills: 1 | Status: CANCELLED | OUTPATIENT
Start: 2022-11-29

## 2022-11-29 RX ORDER — LEVOTHYROXINE SODIUM 0.03 MG/1
TABLET ORAL
COMMUNITY
Start: 2022-10-31 | End: 2022-11-29 | Stop reason: SDUPTHER

## 2022-11-29 ASSESSMENT — PATIENT HEALTH QUESTIONNAIRE - PHQ9
SUM OF ALL RESPONSES TO PHQ QUESTIONS 1-9: 0
1. LITTLE INTEREST OR PLEASURE IN DOING THINGS: 0
SUM OF ALL RESPONSES TO PHQ QUESTIONS 1-9: 0
SUM OF ALL RESPONSES TO PHQ9 QUESTIONS 1 & 2: 0
SUM OF ALL RESPONSES TO PHQ QUESTIONS 1-9: 0
2. FEELING DOWN, DEPRESSED OR HOPELESS: 0
SUM OF ALL RESPONSES TO PHQ QUESTIONS 1-9: 0

## 2022-11-29 ASSESSMENT — ENCOUNTER SYMPTOMS
RHINORRHEA: 0
SORE THROAT: 0
NAUSEA: 0
DIARRHEA: 0
SINUS PRESSURE: 0
ABDOMINAL PAIN: 0
VOMITING: 0
SHORTNESS OF BREATH: 1
COUGH: 1
WHEEZING: 0

## 2022-11-29 NOTE — CARE COORDINATION
Ambulatory Care Coordination Note  11/29/2022    ACC: Wade Villalba, RN    Pt reports feeling poorly recently, very fatigued. Has PCP f/u today. Pt reports med compliance, encouraged to take meds to appmt today for review, refills. Reviewed upcoming Cardiology appmt for 12/21, pt was unaware. Pt thinks he has an appmt tomorrow, nothing in computer, perhaps Podiatry? Encouraged pt to call and check, make a f/u if needed. Pt c/o foot discomfort is \"coming back\". Reports Medicaid application submitted, waiting for reply. Offered patient enrollment in the Remote Patient Monitoring (RPM) program for in-home monitoring: NA. Lab Results       None            Care Coordination Interventions    Referral from Primary Care Provider: No  Suggested Interventions and Community Resources  Social Work: In Process          Goals Addressed                   This Visit's Progress     Care Coordination Self Management   On track     CC Self Management Goal  Patient Goal   Patient is able to discuss self-management of condition(s): CHF, DM, CKD, GERD  Pt demonstrates adherence to medications  Pt demonstrates understanding of self-monitoring  Patient is able to identify Red Flags:  Alert to potential adverse drug reactions(s) or side effects and actions to take should they arise  Discuss target symptoms and actions to take should they arise  Identify problems that require immediate PCP or specialist visit  Patient demonstrates understanding of access to PCP/Specialist:  Understands about scheduling routine Follow Up appointments   Understands about sick day appointment options for worsening of symptoms/progression (Same Day, E Visits)              Prior to Admission medications    Medication Sig Start Date End Date Taking?  Authorizing Provider   glipiZIDE (GLUCOTROL) 5 MG tablet Take 1 tablet by mouth daily 11/9/22   Alexa Gardenia Holter, APRN - CNP   torsemide (DEMADEX) 20 MG tablet Take 1 tablet by mouth daily 11/9/22 Samantha Kauffman DO   atorvastatin (LIPITOR) 40 MG tablet Take 1 tablet by mouth daily 11/9/22   Samantha Kauffman DO   clopidogrel (PLAVIX) 75 MG tablet     Historical Provider, MD   allopurinol (ZYLOPRIM) 300 MG tablet TAKE 1 TABLET BY MOUTH DAILY 10/4/22   HAWK Ledesma CNP   FEROSUL 325 (65 Fe) MG tablet TAKE 1 TABLET BY MOUTH DAILY WITH BREAKFAST 10/4/22   HAWK Ledesma CNP   lisinopril (PRINIVIL;ZESTRIL) 40 MG tablet TAKE 1 TABLET BY MOUTH DAILY 8/22/22   Ramonita Georges MD   gabapentin (NEURONTIN) 300 MG capsule Take 1 capsule by mouth in the morning and at bedtime for 60 days. 7/15/22 9/13/22  HAWK Lyon CNP   HYDROcodone-acetaminophen (NORCO)  MG per tablet Take 1 tablet by mouth every 8 hours as needed.  6/29/21   Historical Provider, MD   aspirin 81 MG chewable tablet Take 81 mg by mouth daily 4/4/22   Historical Provider, MD   carvedilol (COREG) 25 MG tablet Take 25 mg by mouth 2 times daily 5/11/22   Historical Provider, MD   pantoprazole (PROTONIX) 40 MG injection Infuse 40 mg intravenously daily Tablets not inj    Historical Provider, MD   tamsulosin (FLOMAX) 0.4 MG capsule Take 0.4 mg by mouth daily 8/21/21   Historical Provider, MD   potassium chloride 20 MEQ/15ML (10%) oral solution Take 20 mEq by mouth daily    Historical Provider, MD   albuterol-ipratropium (COMBIVENT RESPIMAT)  MCG/ACT AERS inhaler Inhale 1 puff into the lungs    Historical Provider, MD   SYMBICORT 80-4.5 MCG/ACT AERO INHALE 2 PUFFS BY MOUTH TWICE DAILY 4/4/22   Historical Provider, MD   albuterol sulfate HFA (PROVENTIL;VENTOLIN;PROAIR) 108 (90 Base) MCG/ACT inhaler Inhale 2 puffs into the lungs every 4 hours as needed 3/2/22   Historical Provider, MD   nicotine (NICODERM CQ) 21 MG/24HR Place 1 patch onto the skin daily 6/22/22 8/3/22  Harshil Mckeon MD       Future Appointments   Date Time Provider Blue Gimenez   11/29/2022  2:00 PM HAWK Ledesma - JESSICA RUFF [facility] AMB 12/21/2022  2:00 PM DO DYLON Yanez [facility] AMB   4/26/2023  9:00 AM SFE LAB DS [facility]EDS SFE   5/3/2023 10:30 AM HAWK Davsi - JESSICA RUFF [facility] AMB

## 2022-11-29 NOTE — PROGRESS NOTES
Post-ER Follow Up    Aida Triana   YOB: 1945    Date of Office Visit:  11/29/2022  Date of ER visit: 10/31/22    Care management risk score Rising risk (score 2-5) and Complex Care (Scores >=6): No Risk Score On File       ASSESSMENT/PLAN:   Shortness of breath  Patient denies recurrence of SOB since discharge from the ER. Elevated brain natriuretic peptide (BNP) level  Patient encouraged to follow-up with cardiology as directed. Recommended that he take his torsemide BID as prescribed rather than once daily. Smoker  Cessation strongly encouraged. Subclinical hypothyroidism  -     TSH with Reflex; Future  Continue levothyroxine at current dose. Plan to recheck level in about 4 weeks. Medical Decision Making: moderate complexity  No follow-ups on file. On this date 11/29/2022 I have spent 35 minutes reviewing previous notes, test results and face to face with the patient discussing the diagnosis and importance of compliance with the treatment plan as well as documenting on the day of the visit. Subjective:   HPI    Inpatient course: Discharge summary reviewed- see chart. Interval history/Current status:   Patient presents today for follow-up after recent ER visit. He presented to the ER at [facility] on 10/31/22 with complaint of generalized weakness, cough and SOB x 2 weeks. In the ER, CBC and CMP were at baseline. BNP over 1500, which is chronic in nature for patient; Lasix IV given. EKG negative for acute ischemia. Trop x 2 flat. Comparable opacities on chest x-ray. TSH was slightly elevated. Levothyroxine 25 mcg started which patient reports compliance with. Admission for further evaluation was offered, but patient declined and requested discharge to go home. Since returning home from the ER on 10/31, he denies any recurrence of shortness of breath or generalized weakness. He denies any CP, palpitations, dizziness. Cough is at baseline. Smoker x 50+ years per patient. He admits to noncompliance with his torsemide reporting that he only takes it once daily rather than twice daily as prescribed because of frequent bathroom breaks when taken twice daily. He is scheduled to see his cardiologist (Dr. Miguel Malcolm) on 12/21/22.        Patient Active Problem List   Diagnosis    Coronary atherosclerosis of native coronary artery    BPH (benign prostatic hyperplasia)    Diabetes mellitus with circulatory complication (HCC)    GI bleed    Essential hypertension    Cigarette nicotine dependence without complication    Dyslipidemia    DM neuropathies ([address]    Hyperlipidemia    Esophageal foreign body, initial encounter    Adverse effect of COVID-19 vaccine    Tobacco abuse    GERD (gastroesophageal reflux disease)    Benign prostatic hyperplasia with nocturia    JESSICA (acute kidney injury) ([address]    HFrEF (heart failure with reduced ejection fraction) ([address]    UTI due to extended-spectrum beta lactamase (ESBL) producing Escherichia coli    Anemia    Type 2 diabetes with nephropathy (HCC)    CHF (NYHA class III, ACC/AHA stage C) ([facility])    Iron deficiency anemia due to dietary causes    CKD (chronic kidney disease)    Mitral regurgitation    Ischemic cardiomyopathy    PVD (peripheral vascular disease) ([facility])    Skin ulcer of right great toe, limited to breakdown of skin ([address]       Medication list at time of discharge reviewed: Yes    Medications marked \"taking\" at this time  Outpatient Medications Marked as Taking for the 11/29/22 encounter (Office Visit) with HAWK Desai CNP   Medication Sig Dispense Refill    levothyroxine (SYNTHROID) 25 MCG tablet       glipiZIDE (GLUCOTROL) 5 MG tablet Take 1 tablet by mouth daily 90 tablet 1    torsemide (DEMADEX) 20 MG tablet Take 1 tablet by mouth daily 90 tablet 3    atorvastatin (LIPITOR) 40 MG tablet Take 1 tablet by mouth daily 90 tablet 3    clopidogrel (PLAVIX) 75 MG tablet       allopurinol (ZYLOPRIM) 300 MG tablet TAKE 1 TABLET BY MOUTH DAILY 30 tablet 0    FEROSUL 325 (65 Fe) MG tablet TAKE 1 TABLET BY MOUTH DAILY WITH BREAKFAST 30 tablet 0    lisinopril (PRINIVIL;ZESTRIL) 40 MG tablet TAKE 1 TABLET BY MOUTH DAILY 90 tablet 3    HYDROcodone-acetaminophen (NORCO)  MG per tablet Take 1 tablet by mouth every 8 hours as needed. aspirin 81 MG chewable tablet Take 81 mg by mouth daily      carvedilol (COREG) 25 MG tablet Take 25 mg by mouth 2 times daily      tamsulosin (FLOMAX) 0.4 MG capsule Take 0.4 mg by mouth daily      potassium chloride 20 MEQ/15ML (10%) oral solution Take 20 mEq by mouth daily      SYMBICORT 80-4.5 MCG/ACT AERO INHALE 2 PUFFS BY MOUTH TWICE DAILY      albuterol sulfate HFA (PROVENTIL;VENTOLIN;PROAIR) 108 (90 Base) MCG/ACT inhaler Inhale 2 puffs into the lungs every 4 hours as needed          Medications patient taking as of now reconciled against medications ordered at time of hospital discharge: Yes    Review of Systems   Constitutional:  Negative for chills and fever. HENT:  Positive for congestion. Negative for ear pain, postnasal drip, rhinorrhea, sinus pressure and sore throat. Respiratory:  Positive for cough and shortness of breath. Negative for wheezing. Cardiovascular:  Negative for chest pain, palpitations and leg swelling. Gastrointestinal:  Negative for abdominal pain, diarrhea, nausea and vomiting. Genitourinary:  Negative for dysuria and hematuria. Neurological:  Negative for dizziness, light-headedness and headaches. Objective:    BP (!) 115/54 (Site: Left Upper Arm, Position: Sitting, Cuff Size: Medium Adult)   Pulse 74   Temp 98 °F (36.7 °C) (Temporal)   Resp 16   Ht 5' 9\" (1.753 m)   Wt 122 lb (55.3 kg)   SpO2 95%   BMI 18.02 kg/m²       Physical Exam  Vitals and nursing note reviewed. Constitutional:       General: He is not in acute distress. Appearance: Normal appearance.    HENT:      Right Ear: Tympanic membrane, ear canal and external ear normal.      Left Ear: Tympanic membrane, ear canal and external ear normal.      Nose: Nose normal.      Mouth/Throat:      Mouth: Mucous membranes are moist.      Pharynx: Oropharynx is clear. Cardiovascular:      Rate and Rhythm: Normal rate and regular rhythm. Pulmonary:      Effort: Pulmonary effort is normal. No respiratory distress. Breath sounds: Normal breath sounds. No wheezing or rales. Abdominal:      General: Bowel sounds are normal.      Palpations: Abdomen is soft. Tenderness: There is no abdominal tenderness. Musculoskeletal:      Right lower leg: No edema. Left lower leg: No edema. Skin:     General: Skin is warm and dry. Neurological:      Mental Status: He is alert and oriented to person, place, and time. Psychiatric:         Mood and Affect: Mood normal.         An electronic signature was used to authenticate this note.   --HAWK Gonzalez - CNP

## 2022-12-13 ENCOUNTER — CARE COORDINATION (OUTPATIENT)
Dept: CARE COORDINATION | Facility: CLINIC | Age: 77
End: 2022-12-13

## 2022-12-13 NOTE — CARE COORDINATION
Ambulatory Care Coordination Note  12/13/2022    ACC: Jai Willingham, RN    Reviewed recent PCP f/u - reports compliance w/ BID torsemide as per discussion w/ NP. Reports slight edema to ankles, aware of fluid restriction and need to limit salt. Does not weigh daily. Continues to wait on Medicaid decision, has heard nothing. Message to [facility] to update. Reviewed upcoming Cardiology appmt, date/ time. Pt wrote down specifics. Strongly encouraged to go. Offered patient enrollment in the Remote Patient Monitoring (RPM) program for in-home monitoring: NA. Lab Results       None            Care Coordination Interventions    Referral from Primary Care Provider: No  Suggested Interventions and Community Resources  Social Work: In Process          Goals Addressed                   This Visit's Progress     Care Coordination Self Management   On track     CC Self Management Goal  Patient Goal   Patient is able to discuss self-management of condition(s): CHF, DM, CKD, GERD  Pt demonstrates adherence to medications  Pt demonstrates understanding of self-monitoring  Patient is able to identify Red Flags:  Alert to potential adverse drug reactions(s) or side effects and actions to take should they arise  Discuss target symptoms and actions to take should they arise  Identify problems that require immediate PCP or specialist visit  Patient demonstrates understanding of access to PCP/Specialist:  Understands about scheduling routine Follow Up appointments   Understands about sick day appointment options for worsening of symptoms/progression (Same Day, E Visits)              Prior to Admission medications    Medication Sig Start Date End Date Taking?  Authorizing Provider   levothyroxine (SYNTHROID) 25 MCG tablet Take 1 tablet by mouth Daily 11/29/22   HAWK Henning CNP   pantoprazole (PROTONIX) 40 MG tablet Take 1 tablet by mouth every morning (before breakfast) 11/29/22   HAWK Henning - JESSICA FEROSUL 325 (65 Fe) MG tablet TAKE 1 TABLET BY MOUTH DAILY WITH BREAKFAST 11/29/22   HAWK Mckeon CNP   glipiZIDE (GLUCOTROL) 5 MG tablet Take 1 tablet by mouth daily 11/9/22   HAWK Mccarthy CNP   torsemide (DEMADEX) 20 MG tablet Take 1 tablet by mouth daily 11/9/22   Caity Stallings DO   atorvastatin (LIPITOR) 40 MG tablet Take 1 tablet by mouth daily 11/9/22   Caity Stallings, DO   clopidogrel (PLAVIX) 75 MG tablet     Historical Provider, MD   allopurinol (ZYLOPRIM) 300 MG tablet TAKE 1 TABLET BY MOUTH DAILY 10/4/22   HAWK Mckeon CNP   lisinopril (PRINIVIL;ZESTRIL) 40 MG tablet TAKE 1 TABLET BY MOUTH DAILY 8/22/22   Sujit Novak MD   gabapentin (NEURONTIN) 300 MG capsule Take 1 capsule by mouth in the morning and at bedtime for 60 days. 7/15/22 9/13/22  HAWK Lyon CNP   HYDROcodone-acetaminophen (NORCO)  MG per tablet Take 1 tablet by mouth every 8 hours as needed.  6/29/21   Historical Provider, MD   aspirin 81 MG chewable tablet Take 81 mg by mouth daily 4/4/22   Historical Provider, MD   carvedilol (COREG) 25 MG tablet Take 25 mg by mouth 2 times daily 5/11/22   Historical Provider, MD   tamsulosin (FLOMAX) 0.4 MG capsule Take 0.4 mg by mouth daily 8/21/21   Historical Provider, MD   potassium chloride 20 MEQ/15ML (10%) oral solution Take 20 mEq by mouth daily    Historical Provider, MD   SYMBICORT 80-4.5 MCG/ACT AERO INHALE 2 PUFFS BY MOUTH TWICE DAILY 4/4/22   Historical Provider, MD   albuterol sulfate HFA (PROVENTIL;VENTOLIN;PROAIR) 108 (90 Base) MCG/ACT inhaler Inhale 2 puffs into the lungs every 4 hours as needed 3/2/22   Historical Provider, MD   nicotine (NICODERM CQ) 21 MG/24HR Place 1 patch onto the skin daily 6/22/22 8/3/22  Dora Leyden, MD       Future Appointments   Date Time Provider Blue Chapai   12/21/2022  2:00 PM Caity Stallings DO DG [facility] AMB   4/26/2023  9:00 AM SFE LAB DS SFEDS SFE   5/3/2023 10:30 AM Pricilla Tovar, APRN - CNP MAT [facility] AMB

## 2023-01-03 ENCOUNTER — CARE COORDINATION (OUTPATIENT)
Dept: CARE COORDINATION | Facility: CLINIC | Age: 78
End: 2023-01-03

## 2023-01-09 ENCOUNTER — CARE COORDINATION (OUTPATIENT)
Dept: CARE COORDINATION | Facility: CLINIC | Age: 78
End: 2023-01-09

## 2023-01-09 NOTE — CARE COORDINATION
Per ACM RN, pt requested call with Medicaid and disability questions. ALBERT WESTBROOK reached out to pt. Pt inquired about Medicaid status and asked if SS can be increased due to increase in monthly rent. ALBERT WESTBROOK provided numbers for both Medicaid office and SS office so he can inquire. No further needs voiced. ALBERT WESTBROOK will remain on care team for now.

## 2023-01-09 NOTE — CARE COORDINATION
Patient has graduated from the Complex Case Management  program on 1/9/23. Patient/family has the ability to self-manage at this time. Care management goals have been completed. No further Ambulatory Care Manager follow up scheduled. Goals Addressed                   This Visit's Progress     COMPLETED: Care Coordination Self Management   On track     CC Self Management Goal  Patient Goal   Patient is able to discuss self-management of condition(s): CHF, DM, CKD, GERD  Pt demonstrates adherence to medications  Pt demonstrates understanding of self-monitoring  Patient is able to identify Red Flags:  Alert to potential adverse drug reactions(s) or side effects and actions to take should they arise  Discuss target symptoms and actions to take should they arise  Identify problems that require immediate PCP or specialist visit  Patient demonstrates understanding of access to PCP/Specialist:  Understands about scheduling routine Follow Up appointments   Understands about sick day appointment options for worsening of symptoms/progression (Same Day, E Visits)          Pt reports flu-like symptoms last 2 weeks, feeling better today. Encouraged good fluid intake, declines offer to make f/u appmt - he will call if necessary. CC w/ SW [facility], she will continue w/ pt and contact him for questions about disability carlos and Medicaid letter he received. Patient has Ambulatory Care Manager's contact information for any further questions, concerns, or needs.   Patients upcoming visits:    Future Appointments   Date Time Provider Blue Gimenez   1/27/2023  1:45 PM DO DYLON Al AMB   4/26/2023  9:00 AM SFE LAB DS SFEDS SFE   5/3/2023 10:30 AM HAWK Sotelo - JESSICA RUFF GVJUSTIN AMB

## 2023-01-27 ENCOUNTER — OFFICE VISIT (OUTPATIENT)
Dept: CARDIOLOGY CLINIC | Age: 78
End: 2023-01-27
Payer: MEDICARE

## 2023-01-27 VITALS
WEIGHT: 121 LBS | HEIGHT: 69 IN | DIASTOLIC BLOOD PRESSURE: 58 MMHG | HEART RATE: 84 BPM | SYSTOLIC BLOOD PRESSURE: 110 MMHG | BODY MASS INDEX: 17.92 KG/M2

## 2023-01-27 DIAGNOSIS — E78.2 MIXED HYPERLIPIDEMIA: ICD-10-CM

## 2023-01-27 DIAGNOSIS — Z72.0 TOBACCO USE: ICD-10-CM

## 2023-01-27 DIAGNOSIS — N18.9 CHRONIC KIDNEY DISEASE, UNSPECIFIED CKD STAGE: ICD-10-CM

## 2023-01-27 DIAGNOSIS — I25.5 ISCHEMIC CARDIOMYOPATHY: Primary | ICD-10-CM

## 2023-01-27 DIAGNOSIS — Z95.1 HX OF CABG: ICD-10-CM

## 2023-01-27 DIAGNOSIS — I73.9 PAD (PERIPHERAL ARTERY DISEASE) (HCC): ICD-10-CM

## 2023-01-27 DIAGNOSIS — I10 ESSENTIAL HYPERTENSION: ICD-10-CM

## 2023-01-27 DIAGNOSIS — I25.10 CORONARY ARTERY DISEASE INVOLVING NATIVE CORONARY ARTERY OF NATIVE HEART WITHOUT ANGINA PECTORIS: ICD-10-CM

## 2023-01-27 DIAGNOSIS — I50.20 SYSTOLIC HEART FAILURE, UNSPECIFIED HF CHRONICITY (HCC): ICD-10-CM

## 2023-01-27 PROCEDURE — 99214 OFFICE O/P EST MOD 30 MIN: CPT | Performed by: INTERNAL MEDICINE

## 2023-01-27 PROCEDURE — 1123F ACP DISCUSS/DSCN MKR DOCD: CPT | Performed by: INTERNAL MEDICINE

## 2023-01-27 PROCEDURE — 3074F SYST BP LT 130 MM HG: CPT | Performed by: INTERNAL MEDICINE

## 2023-01-27 PROCEDURE — 3078F DIAST BP <80 MM HG: CPT | Performed by: INTERNAL MEDICINE

## 2023-01-27 RX ORDER — ATORVASTATIN CALCIUM 40 MG/1
40 TABLET, FILM COATED ORAL DAILY
Qty: 90 TABLET | Refills: 3 | Status: SHIPPED | OUTPATIENT
Start: 2023-01-27 | End: 2023-01-27

## 2023-01-27 RX ORDER — CARVEDILOL 25 MG/1
25 TABLET ORAL 2 TIMES DAILY
Qty: 180 TABLET | Refills: 3 | Status: SHIPPED | OUTPATIENT
Start: 2023-01-27

## 2023-01-27 RX ORDER — ATORVASTATIN CALCIUM 40 MG/1
40 TABLET, FILM COATED ORAL DAILY
Qty: 90 TABLET | Refills: 3 | Status: SHIPPED | OUTPATIENT
Start: 2023-01-27

## 2023-01-27 RX ORDER — LISINOPRIL 40 MG/1
TABLET ORAL
Qty: 90 TABLET | Refills: 3 | Status: SHIPPED | OUTPATIENT
Start: 2023-01-27

## 2023-01-27 RX ORDER — CLOPIDOGREL BISULFATE 75 MG/1
75 TABLET ORAL DAILY
Qty: 90 TABLET | Refills: 3 | Status: SHIPPED | OUTPATIENT
Start: 2023-01-27

## 2023-01-27 RX ORDER — TORSEMIDE 20 MG/1
20 TABLET ORAL DAILY
Qty: 90 TABLET | Refills: 3 | Status: SHIPPED | OUTPATIENT
Start: 2023-01-27

## 2023-01-27 RX ORDER — FLUTICASONE PROPIONATE 50 MCG
1 SPRAY, SUSPENSION (ML) NASAL DAILY
COMMUNITY

## 2023-01-27 ASSESSMENT — ENCOUNTER SYMPTOMS
SHORTNESS OF BREATH: 0
COUGH: 0
GASTROINTESTINAL NEGATIVE: 1

## 2023-01-27 NOTE — PROGRESS NOTES
[address], [address]     [place] [address]      Patient:  Beto Shipley  1945       SUBJECTIVE:  Beto Shipley is a  77 y.o. male seen for a follow up visit regarding the following:     Chief Complaint   Patient presents with    Cardiomyopathy     CC: follow up ischemic cardiomyopathy      HPI:   77 y.o. male  with a history of CAD s/p CABG x 3, HTN, HLD, ischemic CM, tobacco use, CKD , PAD post popliteal artery angioplasty, DM who is here for follow up.    Patient was last seen in office on 5/11/22 , since then reports that he has been doing fairly well from a heart perspective. He report some pain in his toe on right foot lately. Reports he has been doing ok from a heart perspective. Not interested in ICD at this time. Taking his medications as directed without missing doses. No other complaints at this time.     Cardiovascular Testing:  - Echo 3/11/22: LVEF 25-30 %, RV dilated/mod RV dysfunction, Valves: mod MR, minimally increase MV gradient 5 mmHg (HR 89 BPM), mod TR.   - US Aorta 2/5/19: max 20 mm, no AAA  - Echo 10/22/20 [facility] : LVEF 20-25%, severe global hypokinesis, RV function moderately reduced, mod MR, mild AI, severe TR, mild PH, mildly dilated aorta     Past medical history, past surgical history, family history, social history, and medications were all reviewed with the patient today and updated as necessary.         Patient Active Problem List    Diagnosis Date Noted    Skin ulcer of right great toe, limited to breakdown of skin ([facility]) 05/10/2022    GI bleed 03/10/2022    JESSICA (acute kidney injury) ([facility]) 08/04/2021    HFrEF (heart failure with reduced ejection fraction) ([facility]) 08/04/2021    UTI due to extended-spectrum beta lactamase (ESBL) producing Escherichia coli 08/04/2021    Adverse effect of COVID-19 vaccine 08/01/2021    Benign prostatic hyperplasia with nocturia 08/01/2021    Anemia 08/01/2021    Diabetes mellitus with circulatory  complication ([facility]     Essential hypertension     PVD (peripheral vascular disease) (HCC)     CHF (NYHA class III, ACC/AHA stage C) (HCC)     Iron deficiency anemia due to dietary causes 08/19/2019    Cigarette nicotine dependence without complication 09/51/1732    Tobacco abuse 02/21/2019    CKD (chronic kidney disease)     Ischemic cardiomyopathy     DM neuropathies (HCC)     Hyperlipidemia     Coronary atherosclerosis of native coronary artery     Dyslipidemia     Type 2 diabetes with nephropathy ([facility] 11/02/2018    GERD (gastroesophageal reflux disease)     Esophageal foreign body, initial encounter 01/19/2018     Formatting of this note might be different from the original.  Added automatically from request for surgery [id number]  Last Assessment & Plan:   Formatting of this note might be different from the original.  Will do endoscopy to evaluate. The risks and benefits of the procedure   were discussed with the patient including bleeding, sedation risk, and    1/1000 risk of perforation which could require operative intervention   possible hospitalization and if not recognized could result in severe life   threatening infection. Without the procedure the patient runs the risk of   missed tumor, bleeding source or other pathology such as gastritis. The   patient understands the risks and benefits and wishes to proceed. Written   information regarding the procedure - its purposes, process, and risks,   was given to the patient. BPH (benign prostatic hyperplasia)     Mitral regurgitation        Family History   Problem Relation Age of Onset    Diabetes Mother     Diabetes Brother     Hypertension Brother     Heart Attack Father        Social History     Tobacco Use    Smoking status: Every Day     Packs/day: 0.60     Types: Cigarettes    Smokeless tobacco: Never    Tobacco comments:     Quit smoking: almost a pack a day now, been smoking since 24years old   Substance Use Topics    Alcohol use:  Yes Alcohol/week: 1.0 standard drink     Review of Systems   Constitutional: Negative. Cardiovascular: Negative. Negative for chest pain, dyspnea on exertion, irregular heartbeat, leg swelling, near-syncope and palpitations. Respiratory:  Negative for cough and shortness of breath. Hematologic/Lymphatic: Negative for bleeding problem. Musculoskeletal:         Toe pain. Gastrointestinal: Negative. Neurological: Negative. PHYSICAL EXAM:    BP (!) 110/58   Pulse 84   Ht 5' 9\" (1.753 m)   Wt 121 lb (54.9 kg)   BMI 17.87 kg/m²     Physical Exam  Vitals reviewed. Constitutional:       General: He is not in acute distress. Appearance: He is obese. He is not ill-appearing, toxic-appearing or diaphoretic. HENT:      Head: Normocephalic and atraumatic. Cardiovascular:      Rate and Rhythm: Normal rate and regular rhythm. Heart sounds: Normal heart sounds. No murmur heard. No friction rub. No gallop. Pulmonary:      Effort: Pulmonary effort is normal. No respiratory distress. Breath sounds: Normal breath sounds. No stridor. No wheezing or rales. Abdominal:      General: Abdomen is flat. There is no distension. Palpations: Abdomen is soft. Tenderness: There is no abdominal tenderness. Musculoskeletal:         General: No swelling. Right lower leg: No edema. Left lower leg: No edema. Skin:     General: Skin is warm and dry. Neurological:      Mental Status: He is alert and oriented to person, place, and time. Mental status is at baseline. Gait: Gait abnormal (ambulates with cane). Psychiatric:         Mood and Affect: Mood normal.         Behavior: Behavior normal.         Judgment: Judgment normal.       Medical problems, medical history, and test results were reviewed with the patient today. No results found for this or any previous visit (from the past 168 hour(s)).       Current Outpatient Medications:     fluticasone (FLONASE) 50 MCG/ACT nasal spray, 1 spray by Each Nostril route daily, Disp: , Rfl:     Potassium Chloride Nini ER (KLOR-CON M20 PO), Take by mouth daily, Disp: , Rfl:     levothyroxine (SYNTHROID) 25 MCG tablet, Take 1 tablet by mouth Daily, Disp: 90 tablet, Rfl: 1    pantoprazole (PROTONIX) 40 MG tablet, Take 1 tablet by mouth every morning (before breakfast), Disp: 90 tablet, Rfl: 1    FEROSUL 325 (65 Fe) MG tablet, TAKE 1 TABLET BY MOUTH DAILY WITH BREAKFAST, Disp: 30 tablet, Rfl: 0    glipiZIDE (GLUCOTROL) 5 MG tablet, Take 1 tablet by mouth daily, Disp: 90 tablet, Rfl: 1    torsemide (DEMADEX) 20 MG tablet, Take 1 tablet by mouth daily, Disp: 90 tablet, Rfl: 3    atorvastatin (LIPITOR) 40 MG tablet, Take 1 tablet by mouth daily, Disp: 90 tablet, Rfl: 3    clopidogrel (PLAVIX) 75 MG tablet, , Disp: , Rfl:     lisinopril (PRINIVIL;ZESTRIL) 40 MG tablet, TAKE 1 TABLET BY MOUTH DAILY, Disp: 90 tablet, Rfl: 3    HYDROcodone-acetaminophen (NORCO)  MG per tablet, Take 1 tablet by mouth every 8 hours as needed. , Disp: , Rfl:     aspirin 81 MG chewable tablet, Take 81 mg by mouth daily, Disp: , Rfl:     carvedilol (COREG) 25 MG tablet, Take 25 mg by mouth 2 times daily, Disp: , Rfl:     tamsulosin (FLOMAX) 0.4 MG capsule, Take 0.4 mg by mouth daily, Disp: , Rfl:     SYMBICORT 80-4.5 MCG/ACT AERO, INHALE 2 PUFFS BY MOUTH TWICE DAILY, Disp: , Rfl:     albuterol sulfate HFA (PROVENTIL;VENTOLIN;PROAIR) 108 (90 Base) MCG/ACT inhaler, Inhale 2 puffs into the lungs every 4 hours as needed, Disp: , Rfl:     gabapentin (NEURONTIN) 300 MG capsule, Take 1 capsule by mouth in the morning and at bedtime for 60 days. , Disp: 60 capsule, Rfl: 1    nicotine (NICODERM CQ) 21 MG/24HR, Place 1 patch onto the skin daily, Disp: 42 patch, Rfl: 0     ASSESSMENT/PLAN:       Cardiovascular Testing:  - Echo 3/11/22: LVEF 25-30 %, RV dilated/mod RV dysfunction, Valves: mod MR, minimally increase MV gradient 5 mmHg (HR 89 BPM), mod TR.   - US Aorta 2/5/19: max 20 mm, no AAA  - Echo 10/22/20 [facility] : LVEF 20-25%, severe global hypokinesis, RV function moderately reduced, mod MR, mild AI, severe TR, mild PH, mildly dilated aorta      1. Ischemic cardiomyopathy  2. CHF (NYHA class III, ACC/AHA stage C) (HCC)  - minimal  edema, lungs clear   - continue CHF medications : increase Coreg to 25 BID given sinus tachycardia, continue lisinopril 40, torsemide.  -Discussed possible indication for ICD, patient hesitant to undergo procedures at this time. - check CMP      3. Coronary artery disease involving native coronary artery of native heart without angina pectoris  4. Hx of CABG  -Continue ASA 81, Atorvastatin 40, increase Coreg as above. No recent chest pain. 5. Essential hypertension  -Controlled at this time     6. Mixed hyperlipidemia  -Continue total 40  - lipids from PCP dated 10/21/2022 personally reviewed, total cholesterol 104, LDL 45.    7. Tobacco abuse  -Continue efforts towards complete tobacco cessation     8. Chronic kidney disease, unspecified CKD stage  - check cmp today , Cr elevated 1.7 on labs 10/21/22. Problem List Items Addressed This Visit    None      Instructed patient go to ER or call 911/EMS should symptoms recur or worsen. Patient has been instructed and agrees to call our office with any issues or other concerns related to their cardiac condition(s) and/or complaint(s). No follow-ups on file.     Reford DO Krysta  1/27/2023 1:19 PM

## 2023-02-10 ENCOUNTER — TELEMEDICINE (OUTPATIENT)
Dept: INTERNAL MEDICINE CLINIC | Facility: CLINIC | Age: 78
End: 2023-02-10

## 2023-02-10 DIAGNOSIS — E11.59 TYPE 2 DIABETES MELLITUS WITH OTHER CIRCULATORY COMPLICATION, WITHOUT LONG-TERM CURRENT USE OF INSULIN (HCC): ICD-10-CM

## 2023-02-10 DIAGNOSIS — J44.1 COPD WITH ACUTE EXACERBATION (HCC): Primary | ICD-10-CM

## 2023-02-10 DIAGNOSIS — L97.511 SKIN ULCER OF RIGHT GREAT TOE, LIMITED TO BREAKDOWN OF SKIN (HCC): ICD-10-CM

## 2023-02-10 DIAGNOSIS — J41.0 SMOKERS' COUGH (HCC): ICD-10-CM

## 2023-02-10 RX ORDER — BROMPHENIRAMINE MALEATE, PSEUDOEPHEDRINE HYDROCHLORIDE, AND DEXTROMETHORPHAN HYDROBROMIDE 2; 30; 10 MG/5ML; MG/5ML; MG/5ML
5 SYRUP ORAL 4 TIMES DAILY PRN
Qty: 120 EACH | Refills: 0 | Status: SHIPPED | OUTPATIENT
Start: 2023-02-10

## 2023-02-10 ASSESSMENT — ENCOUNTER SYMPTOMS
COUGH: 1
NAUSEA: 0
APNEA: 0
SHORTNESS OF BREATH: 0
BACK PAIN: 0
COLOR CHANGE: 0
EYE DISCHARGE: 0
ABDOMINAL PAIN: 0
EYE ITCHING: 0
DIARRHEA: 0
EYE PAIN: 0
ABDOMINAL DISTENTION: 0
CONSTIPATION: 0
SINUS PAIN: 0
EYE REDNESS: 0

## 2023-02-10 NOTE — PROGRESS NOTES
[unfilled]  [address] [place] [id number]      PROGRESS NOTE    SUBJECTIVE:   Bud Stager is a 65 y.o. male seen for a follow up visit regarding the following chief complaint:     No chief complaint on file. HPI  Patient with hx of COPD presents with concerns of cough, wheeze and congestion for over 7 days. He was seen by urgent care a few days ago and given an antibiotic and something for his cough. Unable to view records and patient is not able to provide information of medication names. He states his symptoms have not improved. He has no fever. Have some dyspnea on exertion. Denies edema or weakness. Tolerating fluids. Current Outpatient Medications   Medication Sig Dispense Refill    brompheniramine-pseudoephedrine-DM (BROMFED DM) 2-30-10 MG/5ML syrup Take 5 mLs by mouth 4 times daily as needed for Cough 120 each 0    fluticasone (FLONASE) 50 MCG/ACT nasal spray 1 spray by Each Nostril route daily      Potassium Chloride Nini ER (KLOR-CON M20 PO) Take by mouth daily      lisinopril (PRINIVIL;ZESTRIL) 40 MG tablet TAKE 1 TABLET BY MOUTH DAILY 90 tablet 3    torsemide (DEMADEX) 20 MG tablet Take 1 tablet by mouth daily 90 tablet 3    carvedilol (COREG) 25 MG tablet Take 1 tablet by mouth 2 times daily 180 tablet 3    clopidogrel (PLAVIX) 75 MG tablet Take 1 tablet by mouth daily 90 tablet 3    atorvastatin (LIPITOR) 40 MG tablet Take 1 tablet by mouth daily 90 tablet 3    levothyroxine (SYNTHROID) 25 MCG tablet Take 1 tablet by mouth Daily 90 tablet 1    pantoprazole (PROTONIX) 40 MG tablet Take 1 tablet by mouth every morning (before breakfast) 90 tablet 1    FEROSUL 325 (65 Fe) MG tablet TAKE 1 TABLET BY MOUTH DAILY WITH BREAKFAST 30 tablet 0    glipiZIDE (GLUCOTROL) 5 MG tablet Take 1 tablet by mouth daily 90 tablet 1    gabapentin (NEURONTIN) 300 MG capsule Take 1 capsule by mouth in the morning and at bedtime for 60 days.  60 capsule 1    HYDROcodone-acetaminophen (NORCO)  MG per tablet Take 1 tablet by mouth every 8 hours as needed. aspirin 81 MG chewable tablet Take 81 mg by mouth daily      tamsulosin (FLOMAX) 0.4 MG capsule Take 0.4 mg by mouth daily      SYMBICORT 80-4.5 MCG/ACT AERO INHALE 2 PUFFS BY MOUTH TWICE DAILY      albuterol sulfate HFA (PROVENTIL;VENTOLIN;PROAIR) 108 (90 Base) MCG/ACT inhaler Inhale 2 puffs into the lungs every 4 hours as needed      nicotine (NICODERM CQ) 21 MG/24HR Place 1 patch onto the skin daily 42 patch 0     No current facility-administered medications for this visit. Allergies   Allergen Reactions    Iohexol Hives    Other Hives    Iodine Rash       Past Medical History:   Diagnosis Date    BPH (benign prostatic hyperplasia)     CHF (NYHA class III, ACC/AHA stage C) ([facility])     Chronic kidney disease     CKD (chronic kidney disease)     COPD (chronic obstructive pulmonary disease) ([facility])     Coronary atherosclerosis of native coronary artery     Diabetes mellitus with circulatory complication ([facility])     DM neuropathies ([facility])     Dyslipidemia     GERD (gastroesophageal reflux disease)     Hyperlipidemia     Hypertension     Ischemic cardiomyopathy     Mitral regurgitation     PVD (peripheral vascular disease) ([facility])      Past Surgical History:   Procedure Laterality Date    CAROTID ENDARTERECTOMY      CORONARY ARTERY BYPASS GRAFT  2005     Family History   Problem Relation Age of Onset    Diabetes Mother     Diabetes Brother     Hypertension Brother     Heart Attack Father      Social History     Tobacco Use    Smoking status: Every Day     Packs/day: 0.60     Types: Cigarettes    Smokeless tobacco: Never    Tobacco comments:     Quit smoking: almost a pack a day now, been smoking since 24years old   Substance Use Topics    Alcohol use: Yes     Alcohol/week: 1.0 standard drink       Review of Systems   Constitutional:  Negative for activity change, appetite change, chills, fatigue and fever. HENT:  Positive for congestion. Negative for ear pain and sinus pain. Eyes:  Negative for pain, discharge, redness and itching. Respiratory:  Positive for cough. Negative for apnea and shortness of breath. Cardiovascular:  Negative for chest pain, palpitations and leg swelling. Gastrointestinal:  Negative for abdominal distention, abdominal pain, constipation, diarrhea and nausea. Endocrine: Negative for cold intolerance and heat intolerance. Genitourinary:  Negative for difficulty urinating, dysuria, enuresis and urgency. Musculoskeletal:  Negative for arthralgias, back pain, joint swelling, myalgias and neck pain. Skin:  Negative for color change and rash. Neurological:  Negative for dizziness, weakness and headaches. Psychiatric/Behavioral:  Negative for behavioral problems and sleep disturbance. The patient is not nervous/anxious. OBJECTIVE:  There were no vitals taken for this visit. Physical Exam  NA    ASSESSMENT and PLAN  Diagnoses and all orders for this visit:    COPD with acute exacerbation ([address]  -     brompheniramine-pseudoephedrine-DM (BROMFED DM) 2-30-10 MG/5ML syrup; Take 5 mLs by mouth 4 times daily as needed for Cough    Smokers' cough ([address]    Skin ulcer of right great toe, limited to breakdown of skin ([address]    Type 2 diabetes mellitus with other circulatory complication, without long-term current use of insulin ([address]    Continue antibiotic. Start on bromfed prn. Return in office in 7-10 days for recheck, sooner if symptoms worsen. Obtain oxygen sat with goal >90% on RA. Go to ER should dyspnea become worse or if oxygen is <90% on RA at home. Patient verbalized understanding.    Pursuant to the emergency declaration under the [facility], [facility] waiver authority and the [employer] Act, this Virtual Visit was conducted, with patient's consent, to reduce the patient's risk of exposure to COVID-19 and provide continuity of care for an established patient. Services were provided through a telephone synchronous discussion virtually to substitute for in-person clinic visit. Patient consented to virtual visit. Greater than 50% of this 15 min visit was spent counseling the patient about test results, prognosis, importance of compliance, education about disease process, benefits of medications, instructions for management of acute symptoms, and follow up plans. Follow-up and Dispositions    Return if symptoms worsen or fail to improve.          Janel May NP, APRN - CNP

## 2023-04-24 RX ORDER — GLIPIZIDE 5 MG/1
5 TABLET ORAL DAILY
Qty: 90 TABLET | Refills: 1 | OUTPATIENT
Start: 2023-04-24

## 2023-05-03 ENCOUNTER — OFFICE VISIT (OUTPATIENT)
Dept: INTERNAL MEDICINE CLINIC | Facility: CLINIC | Age: 78
End: 2023-05-03
Payer: MEDICARE

## 2023-05-03 VITALS
TEMPERATURE: 97.1 F | DIASTOLIC BLOOD PRESSURE: 70 MMHG | SYSTOLIC BLOOD PRESSURE: 140 MMHG | HEART RATE: 76 BPM | WEIGHT: 114 LBS | OXYGEN SATURATION: 99 % | HEIGHT: 69 IN | BODY MASS INDEX: 16.88 KG/M2

## 2023-05-03 DIAGNOSIS — B37.2 YEAST INFECTION OF THE SKIN: ICD-10-CM

## 2023-05-03 DIAGNOSIS — R82.90 ABNORMAL FINDING ON URINALYSIS: ICD-10-CM

## 2023-05-03 DIAGNOSIS — I50.9 CHF (NYHA CLASS III, ACC/AHA STAGE C) (HCC): ICD-10-CM

## 2023-05-03 DIAGNOSIS — Z76.0 MEDICATION REFILL: ICD-10-CM

## 2023-05-03 DIAGNOSIS — R63.4 WEIGHT LOSS, UNINTENTIONAL: Primary | ICD-10-CM

## 2023-05-03 DIAGNOSIS — I10 ESSENTIAL HYPERTENSION: ICD-10-CM

## 2023-05-03 DIAGNOSIS — J41.1 MUCOPURULENT CHRONIC BRONCHITIS (HCC): ICD-10-CM

## 2023-05-03 DIAGNOSIS — R10.84 GENERALIZED ABDOMINAL PAIN: ICD-10-CM

## 2023-05-03 DIAGNOSIS — J41.0 SMOKERS' COUGH (HCC): ICD-10-CM

## 2023-05-03 DIAGNOSIS — N30.00 ACUTE CYSTITIS WITHOUT HEMATURIA: ICD-10-CM

## 2023-05-03 LAB
BILIRUBIN, URINE, POC: NEGATIVE
BLOOD URINE, POC: ABNORMAL
GLUCOSE URINE, POC: NEGATIVE
KETONES, URINE, POC: NEGATIVE
LEUKOCYTE ESTERASE, URINE, POC: ABNORMAL
NITRITE, URINE, POC: POSITIVE
PH, URINE, POC: 5.5 (ref 4.6–8)
PROTEIN,URINE, POC: NEGATIVE
SPECIFIC GRAVITY, URINE, POC: 1.01 (ref 1–1.03)
URINALYSIS CLARITY, POC: ABNORMAL
URINALYSIS COLOR, POC: YELLOW
UROBILINOGEN, POC: NORMAL

## 2023-05-03 PROCEDURE — 81003 URINALYSIS AUTO W/O SCOPE: CPT | Performed by: NURSE PRACTITIONER

## 2023-05-03 PROCEDURE — 3078F DIAST BP <80 MM HG: CPT | Performed by: NURSE PRACTITIONER

## 2023-05-03 PROCEDURE — 1123F ACP DISCUSS/DSCN MKR DOCD: CPT | Performed by: NURSE PRACTITIONER

## 2023-05-03 PROCEDURE — 3077F SYST BP >= 140 MM HG: CPT | Performed by: NURSE PRACTITIONER

## 2023-05-03 PROCEDURE — 99214 OFFICE O/P EST MOD 30 MIN: CPT | Performed by: NURSE PRACTITIONER

## 2023-05-03 RX ORDER — FERROUS SULFATE 325(65) MG
TABLET ORAL
Qty: 30 TABLET | Refills: 0 | Status: SHIPPED | OUTPATIENT
Start: 2023-05-03

## 2023-05-03 RX ORDER — NYSTATIN 100000 U/G
CREAM TOPICAL
Qty: 30 G | Refills: 0 | Status: SHIPPED | OUTPATIENT
Start: 2023-05-03

## 2023-05-03 RX ORDER — NITROFURANTOIN 25; 75 MG/1; MG/1
100 CAPSULE ORAL 2 TIMES DAILY
Qty: 20 CAPSULE | Refills: 0 | Status: SHIPPED | OUTPATIENT
Start: 2023-05-03 | End: 2023-05-13

## 2023-05-03 RX ORDER — POTASSIUM CHLORIDE 20 MEQ/1
20 TABLET, EXTENDED RELEASE ORAL DAILY
Qty: 90 TABLET | Refills: 1 | Status: SHIPPED | OUTPATIENT
Start: 2023-05-03

## 2023-05-03 ASSESSMENT — ENCOUNTER SYMPTOMS
EYE DISCHARGE: 0
EYE REDNESS: 0
EYE PAIN: 0
ABDOMINAL PAIN: 1
COLOR CHANGE: 0
EYE ITCHING: 0
SHORTNESS OF BREATH: 0
NAUSEA: 0
CONSTIPATION: 0
DIARRHEA: 0
APNEA: 0
COUGH: 1
BACK PAIN: 0
SINUS PAIN: 0
ABDOMINAL DISTENTION: 0

## 2023-05-03 ASSESSMENT — PATIENT HEALTH QUESTIONNAIRE - PHQ9
SUM OF ALL RESPONSES TO PHQ QUESTIONS 1-9: 1
2. FEELING DOWN, DEPRESSED OR HOPELESS: 1
SUM OF ALL RESPONSES TO PHQ QUESTIONS 1-9: 1
SUM OF ALL RESPONSES TO PHQ9 QUESTIONS 1 & 2: 1
1. LITTLE INTEREST OR PLEASURE IN DOING THINGS: 0
SUM OF ALL RESPONSES TO PHQ QUESTIONS 1-9: 1
SUM OF ALL RESPONSES TO PHQ QUESTIONS 1-9: 1

## 2023-05-03 NOTE — PROGRESS NOTES
[facility]  [address] [place] [phone]      PROGRESS NOTE    SUBJECTIVE:   Jazmín Cunningham is a 65 y.o. male seen for a follow up visit regarding the following chief complaint:     Chief Complaint   Patient presents with    6 Month Follow-Up       HPI  Patient presents for 6 month follow up labs and management of chronic disease. Labs from hospital encounter reviewed; Creatinine elevated to 1.6 which appears to be around his normal range. Recently discharged from hospital for UTI on 3/22. HTN: he is compliant with current treatment regimen, denies any intolerance. he denies any headache, dizziness or edema. Chronic tobacco user with chronic cough: this is uncontrolled, not ready to stop tobacco. He has tried Nicoderm but wasn't able to afford this. Has chronic tobacco cough and YEAGER. No distress today. Weight loss, decreased appetite. CHF with CAD: following cardiology routinely. Remains on Demadex and potassium. HL: stable on current regimen, denies any intolerance to lipitor and he is compliant with this. Thyroid disease stable on synthroid. Diabetes: A1c-7.4%  he is compliant with current medications and denies any intolerance. ACE/ARB-yes    Current Outpatient Medications   Medication Sig Dispense Refill    FEROSUL 325 (65 Fe) MG tablet TAKE 1 TABLET BY MOUTH DAILY WITH BREAKFAST 30 tablet 0    potassium chloride (KLOR-CON M20) 20 MEQ extended release tablet Take 1 tablet by mouth daily 90 tablet 1    Nutritional Supplements (ENSURE PLUS HIGH PROTEIN) LIQD Please dispense single case of ensure to take BID as meal supplement. 237 mL 2    nystatin (MYCOSTATIN) 705665 UNIT/GM cream Apply topically 2 times daily.  30 g 0    nitrofurantoin, macrocrystal-monohydrate, (MACROBID) 100 MG capsule Take 1 capsule by mouth 2 times daily for 10 days 20 capsule 0    lisinopril (PRINIVIL;ZESTRIL) 40 MG tablet TAKE 1 TABLET BY MOUTH DAILY 90 tablet 3    torsemide (DEMADEX) 20 MG tablet

## 2023-05-06 LAB
BACTERIA SPEC CULT: ABNORMAL
SERVICE CMNT-IMP: ABNORMAL

## 2023-05-19 ENCOUNTER — OFFICE VISIT (OUTPATIENT)
Dept: INTERNAL MEDICINE CLINIC | Facility: CLINIC | Age: 78
End: 2023-05-19
Payer: MEDICARE

## 2023-05-19 VITALS
BODY MASS INDEX: 17.33 KG/M2 | HEIGHT: 69 IN | DIASTOLIC BLOOD PRESSURE: 58 MMHG | OXYGEN SATURATION: 99 % | TEMPERATURE: 97.5 F | SYSTOLIC BLOOD PRESSURE: 120 MMHG | WEIGHT: 117 LBS | HEART RATE: 82 BPM

## 2023-05-19 DIAGNOSIS — E11.649 HYPOGLYCEMIC EPISODE IN PATIENT WITH DIABETES MELLITUS (HCC): ICD-10-CM

## 2023-05-19 DIAGNOSIS — B96.20 E-COLI UTI: Primary | ICD-10-CM

## 2023-05-19 DIAGNOSIS — N39.0 E-COLI UTI: Primary | ICD-10-CM

## 2023-05-19 PROCEDURE — 3074F SYST BP LT 130 MM HG: CPT | Performed by: NURSE PRACTITIONER

## 2023-05-19 PROCEDURE — 3078F DIAST BP <80 MM HG: CPT | Performed by: NURSE PRACTITIONER

## 2023-05-19 PROCEDURE — 99214 OFFICE O/P EST MOD 30 MIN: CPT | Performed by: NURSE PRACTITIONER

## 2023-05-19 PROCEDURE — 1123F ACP DISCUSS/DSCN MKR DOCD: CPT | Performed by: NURSE PRACTITIONER

## 2023-05-19 PROCEDURE — 96372 THER/PROPH/DIAG INJ SC/IM: CPT | Performed by: NURSE PRACTITIONER

## 2023-05-19 RX ORDER — CEFTRIAXONE 1 G/1
1000 INJECTION, POWDER, FOR SOLUTION INTRAMUSCULAR; INTRAVENOUS ONCE
Status: COMPLETED | OUTPATIENT
Start: 2023-05-19 | End: 2023-05-19

## 2023-05-19 RX ADMIN — CEFTRIAXONE 1000 MG: 1 INJECTION, POWDER, FOR SOLUTION INTRAMUSCULAR; INTRAVENOUS at 13:10

## 2023-05-19 ASSESSMENT — ENCOUNTER SYMPTOMS
EYE REDNESS: 0
ABDOMINAL DISTENTION: 0
SINUS PAIN: 0
SHORTNESS OF BREATH: 0
DIARRHEA: 0
BACK PAIN: 0
EYE DISCHARGE: 0
EYE PAIN: 0
EYE ITCHING: 0
APNEA: 0
COLOR CHANGE: 0
COUGH: 0
ABDOMINAL PAIN: 0
CONSTIPATION: 0
NAUSEA: 0

## 2023-05-19 ASSESSMENT — PATIENT HEALTH QUESTIONNAIRE - PHQ9
1. LITTLE INTEREST OR PLEASURE IN DOING THINGS: 1
SUM OF ALL RESPONSES TO PHQ9 QUESTIONS 1 & 2: 2
SUM OF ALL RESPONSES TO PHQ QUESTIONS 1-9: 2
2. FEELING DOWN, DEPRESSED OR HOPELESS: 1
SUM OF ALL RESPONSES TO PHQ QUESTIONS 1-9: 2

## 2023-05-19 NOTE — PROGRESS NOTES
[unfilled]  [address] [place] [id number]      PROGRESS NOTE    SUBJECTIVE:   Jess Paulson is a 65 y.o. male seen for a follow up visit regarding the following chief complaint:     Chief Complaint   Patient presents with    Hypoglycemia     Pt c/o hypoglycemia on 5/15 of 44. Hypoglycemia  Pertinent negatives include no abdominal pain, arthralgias, chest pain, chills, congestion, coughing, fatigue, fever, headaches, joint swelling, myalgias, nausea, neck pain, rash or weakness. Patient presents for concerns of hypoglycemia episode. He remains on glipizide 5mg daily for management of diabetes. He has not been eating breakfast with his glipizide as he is not always hungry in the morning. He had an episode of near syncope and severe fatigue around 9:30 and EMS was called; he had been found to have a BS of 44. He has unintentional weigh lost of around 15lb over the past 6 months. He recently started on ensure to assist with balancing malnutrition and has gained about 3lb since last visit. He is also planning to obtain CT scan of chest for chronic tobacco use with weight loss to rule out signs of malignancy. Additional signs of recurrent UTI. He is not sure if he's been taking his macrobid that was prescribed to him 2 weeks ago for ongoing UTI. Today he continues to have abdominal pains. He had a positive urine culture on 5/3/23 that showed e-coli. Afebrile. Current Outpatient Medications   Medication Sig Dispense Refill    FEROSUL 325 (65 Fe) MG tablet TAKE 1 TABLET BY MOUTH DAILY WITH BREAKFAST 30 tablet 0    potassium chloride (KLOR-CON M20) 20 MEQ extended release tablet Take 1 tablet by mouth daily 90 tablet 1    Nutritional Supplements (ENSURE PLUS HIGH PROTEIN) LIQD Please dispense single case of ensure to take BID as meal supplement. 237 mL 2    nystatin (MYCOSTATIN) 993699 UNIT/GM cream Apply topically 2 times daily.  30 g 0    lisinopril (PRINIVIL;ZESTRIL) 40 MG tablet

## 2023-05-26 ENCOUNTER — OFFICE VISIT (OUTPATIENT)
Dept: INTERNAL MEDICINE CLINIC | Facility: CLINIC | Age: 78
End: 2023-05-26
Payer: MEDICARE

## 2023-05-26 VITALS
OXYGEN SATURATION: 98 % | WEIGHT: 114 LBS | BODY MASS INDEX: 16.88 KG/M2 | HEART RATE: 85 BPM | DIASTOLIC BLOOD PRESSURE: 60 MMHG | HEIGHT: 69 IN | SYSTOLIC BLOOD PRESSURE: 138 MMHG | TEMPERATURE: 97.2 F

## 2023-05-26 DIAGNOSIS — N39.0 E-COLI UTI: Primary | ICD-10-CM

## 2023-05-26 DIAGNOSIS — B96.20 E-COLI UTI: Primary | ICD-10-CM

## 2023-05-26 DIAGNOSIS — B37.2 YEAST INFECTION OF THE SKIN: ICD-10-CM

## 2023-05-26 DIAGNOSIS — Z76.0 MEDICATION REFILL: ICD-10-CM

## 2023-05-26 LAB
BILIRUBIN, URINE, POC: NEGATIVE
BLOOD URINE, POC: NEGATIVE
GLUCOSE URINE, POC: NEGATIVE
KETONES, URINE, POC: NEGATIVE
LEUKOCYTE ESTERASE, URINE, POC: NEGATIVE
NITRITE, URINE, POC: NEGATIVE
PH, URINE, POC: 5.5 (ref 4.6–8)
PROTEIN,URINE, POC: NEGATIVE
SPECIFIC GRAVITY, URINE, POC: 1.01 (ref 1–1.03)
URINALYSIS CLARITY, POC: CLEAR
URINALYSIS COLOR, POC: YELLOW
UROBILINOGEN, POC: NORMAL

## 2023-05-26 PROCEDURE — 81003 URINALYSIS AUTO W/O SCOPE: CPT | Performed by: NURSE PRACTITIONER

## 2023-05-26 PROCEDURE — 99214 OFFICE O/P EST MOD 30 MIN: CPT | Performed by: NURSE PRACTITIONER

## 2023-05-26 PROCEDURE — 1123F ACP DISCUSS/DSCN MKR DOCD: CPT | Performed by: NURSE PRACTITIONER

## 2023-05-26 PROCEDURE — 3075F SYST BP GE 130 - 139MM HG: CPT | Performed by: NURSE PRACTITIONER

## 2023-05-26 PROCEDURE — 3078F DIAST BP <80 MM HG: CPT | Performed by: NURSE PRACTITIONER

## 2023-05-26 RX ORDER — GLIPIZIDE 5 MG/1
5 TABLET ORAL DAILY
Qty: 90 TABLET | Refills: 1 | Status: SHIPPED | OUTPATIENT
Start: 2023-05-26

## 2023-05-26 SDOH — ECONOMIC STABILITY: FOOD INSECURITY: WITHIN THE PAST 12 MONTHS, YOU WORRIED THAT YOUR FOOD WOULD RUN OUT BEFORE YOU GOT MONEY TO BUY MORE.: SOMETIMES TRUE

## 2023-05-26 SDOH — ECONOMIC STABILITY: INCOME INSECURITY: HOW HARD IS IT FOR YOU TO PAY FOR THE VERY BASICS LIKE FOOD, HOUSING, MEDICAL CARE, AND HEATING?: SOMEWHAT HARD

## 2023-05-26 SDOH — ECONOMIC STABILITY: FOOD INSECURITY: WITHIN THE PAST 12 MONTHS, THE FOOD YOU BOUGHT JUST DIDN'T LAST AND YOU DIDN'T HAVE MONEY TO GET MORE.: SOMETIMES TRUE

## 2023-05-26 SDOH — ECONOMIC STABILITY: HOUSING INSECURITY
IN THE LAST 12 MONTHS, WAS THERE A TIME WHEN YOU DID NOT HAVE A STEADY PLACE TO SLEEP OR SLEPT IN A SHELTER (INCLUDING NOW)?: YES

## 2023-05-26 ASSESSMENT — ENCOUNTER SYMPTOMS
COLOR CHANGE: 0
ABDOMINAL PAIN: 0
EYE ITCHING: 0
APNEA: 0
DIARRHEA: 0
SINUS PAIN: 0
EYE REDNESS: 0
EYE DISCHARGE: 0
SHORTNESS OF BREATH: 0
CONSTIPATION: 0
COUGH: 0
BACK PAIN: 0
EYE PAIN: 0
ABDOMINAL DISTENTION: 0
NAUSEA: 0

## 2023-05-26 ASSESSMENT — PATIENT HEALTH QUESTIONNAIRE - PHQ9
1. LITTLE INTEREST OR PLEASURE IN DOING THINGS: 0
SUM OF ALL RESPONSES TO PHQ QUESTIONS 1-9: 1
2. FEELING DOWN, DEPRESSED OR HOPELESS: 1
SUM OF ALL RESPONSES TO PHQ9 QUESTIONS 1 & 2: 1
SUM OF ALL RESPONSES TO PHQ QUESTIONS 1-9: 1

## 2023-05-26 NOTE — PATIENT INSTRUCTIONS
TRANSPORTATION RESOURCES    Medicaid Jolee Schilder: Kirstin Fitch. NEW NAME - [employer]   Phone:  [phone]  Must call for a ride at least 3 days before your appointment. Call Monday- Friday 8:00am to 5:00pm.    Transportation is available for MD appts, dialysis, x-rays, lab work, drug store, or other medical appointments. [facility] Aging  What they offer: Provides assistance and medical transport to adults 60 years and older. Phone: [phone]    [employer]   What they offer: Charge a fee for transport (not free)  Phone: [phone]    [employer] Demand   What they offer: Charge a fee for transport (not free)  Phone: [phone] they offer: [employer] is accessible via an online platform that connects patients with non-emergency medical transportation (NEMT.) Roundtrip is a comprehensive solution which supports all levels of transport: rideshare (Lyft/Uber), Taxis, wheelchair vans, stretcher vehicles, ALS/BLS, and all payors when and where they are needed. https://[employer]/      Need additional resources? Call 211 or visit Find Help:  https://www. findhelp.org/HOUSING [address] [facility]  What they offer: Housing Choice Vouchers (Section 8) rental assistance available to persons with disabilities, families with children, and older adults whose household income is below 80% the median income for [facility]. http://www.[employer]/. net  Phone: 583.753.5921    [address] they offer: Free 24/7 access to trained counselors for local resources and assistance   o Website: [employer].uk. asp   o Phone Number: [phone] (text messaging accepted)       [place]:[email or web address] [email or web address]/[facility]/    [facility] [employer]

## 2023-05-26 NOTE — PROGRESS NOTES
[address]       PROGRESS NOTE    SUBJECTIVE:   Zaheer Gonzalez is a 65 y.o. male seen for a follow up visit regarding the following chief complaint:     Chief Complaint   Patient presents with    Other     Patient here for urine recheck. Other  Pertinent negatives include no abdominal pain, arthralgias, chest pain, chills, congestion, coughing, fatigue, fever, headaches, joint swelling, myalgias, nausea, neck pain, rash or weakness. Patient presents for urine recheck. He was prescribed Macrobid for UTI and finished this and was then given rocephin IM in office. Urine dip normal today. Denies any dysuria or abdominal pain. Still using topical cream for yeast infection on umbilicus and this is improving. Current Outpatient Medications   Medication Sig Dispense Refill    glipiZIDE (GLUCOTROL) 5 MG tablet Take 1 tablet by mouth daily 90 tablet 1    FEROSUL 325 (65 Fe) MG tablet TAKE 1 TABLET BY MOUTH DAILY WITH BREAKFAST 30 tablet 0    potassium chloride (KLOR-CON M20) 20 MEQ extended release tablet Take 1 tablet by mouth daily 90 tablet 1    Nutritional Supplements (ENSURE PLUS HIGH PROTEIN) LIQD Please dispense single case of ensure to take BID as meal supplement. 237 mL 2    nystatin (MYCOSTATIN) 021667 UNIT/GM cream Apply topically 2 times daily.  30 g 0    fluticasone (FLONASE) 50 MCG/ACT nasal spray 1 spray by Each Nostril route daily      lisinopril (PRINIVIL;ZESTRIL) 40 MG tablet TAKE 1 TABLET BY MOUTH DAILY 90 tablet 3    torsemide (DEMADEX) 20 MG tablet Take 1 tablet by mouth daily 90 tablet 3    carvedilol (COREG) 25 MG tablet Take 1 tablet by mouth 2 times daily 180 tablet 3    clopidogrel (PLAVIX) 75 MG tablet Take 1 tablet by mouth daily 90 tablet 3    atorvastatin (LIPITOR) 40 MG tablet Take 1 tablet by mouth daily 90 tablet 3    levothyroxine (SYNTHROID) 25 MCG tablet Take 1 tablet by mouth Daily 90 tablet 1    pantoprazole (PROTONIX)

## 2023-06-28 ENCOUNTER — OFFICE VISIT (OUTPATIENT)
Dept: INTERNAL MEDICINE CLINIC | Facility: CLINIC | Age: 78
End: 2023-06-28
Payer: MEDICARE

## 2023-06-28 VITALS
OXYGEN SATURATION: 95 % | SYSTOLIC BLOOD PRESSURE: 90 MMHG | TEMPERATURE: 97.2 F | BODY MASS INDEX: 16.74 KG/M2 | HEART RATE: 98 BPM | DIASTOLIC BLOOD PRESSURE: 50 MMHG | HEIGHT: 69 IN | WEIGHT: 113 LBS

## 2023-06-28 DIAGNOSIS — N39.0 RECURRENT UTI: Primary | ICD-10-CM

## 2023-06-28 DIAGNOSIS — Z76.0 MEDICATION REFILL: ICD-10-CM

## 2023-06-28 DIAGNOSIS — R10.2 PELVIC PAIN: ICD-10-CM

## 2023-06-28 DIAGNOSIS — R19.5 DARK STOOLS: ICD-10-CM

## 2023-06-28 DIAGNOSIS — E03.8 SUBCLINICAL HYPOTHYROIDISM: ICD-10-CM

## 2023-06-28 DIAGNOSIS — N39.0 RECURRENT UTI: ICD-10-CM

## 2023-06-28 LAB
BASOPHILS # BLD: 0 K/UL (ref 0–0.2)
BASOPHILS NFR BLD: 1 % (ref 0–2)
BILIRUBIN, URINE, POC: NEGATIVE
BLOOD URINE, POC: ABNORMAL
DIFFERENTIAL METHOD BLD: ABNORMAL
EOSINOPHIL # BLD: 0.3 K/UL (ref 0–0.8)
EOSINOPHIL NFR BLD: 6 % (ref 0.5–7.8)
ERYTHROCYTE [DISTWIDTH] IN BLOOD BY AUTOMATED COUNT: 14.8 % (ref 11.9–14.6)
GLUCOSE URINE, POC: NEGATIVE
HCT VFR BLD AUTO: 30.1 % (ref 41.1–50.3)
HGB BLD-MCNC: 9.4 G/DL (ref 13.6–17.2)
IMM GRANULOCYTES # BLD AUTO: 0 K/UL (ref 0–0.5)
IMM GRANULOCYTES NFR BLD AUTO: 0 % (ref 0–5)
KETONES, URINE, POC: NEGATIVE
LEUKOCYTE ESTERASE, URINE, POC: ABNORMAL
LYMPHOCYTES # BLD: 1.3 K/UL (ref 0.5–4.6)
LYMPHOCYTES NFR BLD: 24 % (ref 13–44)
MCH RBC QN AUTO: 31.6 PG (ref 26.1–32.9)
MCHC RBC AUTO-ENTMCNC: 31.2 G/DL (ref 31.4–35)
MCV RBC AUTO: 101.3 FL (ref 82–102)
MONOCYTES # BLD: 0.6 K/UL (ref 0.1–1.3)
MONOCYTES NFR BLD: 11 % (ref 4–12)
NEUTS SEG # BLD: 3.3 K/UL (ref 1.7–8.2)
NEUTS SEG NFR BLD: 59 % (ref 43–78)
NITRITE, URINE, POC: POSITIVE
NRBC # BLD: 0 K/UL (ref 0–0.2)
PH, URINE, POC: 5.5 (ref 4.6–8)
PLATELET # BLD AUTO: 99 K/UL (ref 150–450)
PMV BLD AUTO: 10.7 FL (ref 9.4–12.3)
PROTEIN,URINE, POC: NEGATIVE
RBC # BLD AUTO: 2.97 M/UL (ref 4.23–5.6)
SPECIFIC GRAVITY, URINE, POC: 1.01 (ref 1–1.03)
URINALYSIS CLARITY, POC: CLEAR
URINALYSIS COLOR, POC: YELLOW
UROBILINOGEN, POC: NORMAL
WBC # BLD AUTO: 5.6 K/UL (ref 4.3–11.1)

## 2023-06-28 PROCEDURE — 1123F ACP DISCUSS/DSCN MKR DOCD: CPT | Performed by: NURSE PRACTITIONER

## 2023-06-28 PROCEDURE — 3078F DIAST BP <80 MM HG: CPT | Performed by: NURSE PRACTITIONER

## 2023-06-28 PROCEDURE — 81003 URINALYSIS AUTO W/O SCOPE: CPT | Performed by: NURSE PRACTITIONER

## 2023-06-28 PROCEDURE — 99214 OFFICE O/P EST MOD 30 MIN: CPT | Performed by: NURSE PRACTITIONER

## 2023-06-28 PROCEDURE — 3074F SYST BP LT 130 MM HG: CPT | Performed by: NURSE PRACTITIONER

## 2023-06-28 RX ORDER — FERROUS SULFATE 325(65) MG
TABLET ORAL
Qty: 30 TABLET | Refills: 0 | Status: SHIPPED | OUTPATIENT
Start: 2023-06-28

## 2023-06-28 RX ORDER — LEVOTHYROXINE SODIUM 0.03 MG/1
25 TABLET ORAL DAILY
Qty: 90 TABLET | Refills: 1 | Status: SHIPPED | OUTPATIENT
Start: 2023-06-28

## 2023-06-28 RX ORDER — SULFAMETHOXAZOLE AND TRIMETHOPRIM 800; 160 MG/1; MG/1
1 TABLET ORAL 2 TIMES DAILY
Qty: 14 TABLET | Refills: 0 | Status: SHIPPED | OUTPATIENT
Start: 2023-06-28 | End: 2023-07-05

## 2023-06-28 ASSESSMENT — PATIENT HEALTH QUESTIONNAIRE - PHQ9
SUM OF ALL RESPONSES TO PHQ QUESTIONS 1-9: 2
2. FEELING DOWN, DEPRESSED OR HOPELESS: 1
SUM OF ALL RESPONSES TO PHQ QUESTIONS 1-9: 2
1. LITTLE INTEREST OR PLEASURE IN DOING THINGS: 1
SUM OF ALL RESPONSES TO PHQ QUESTIONS 1-9: 2
SUM OF ALL RESPONSES TO PHQ QUESTIONS 1-9: 2
SUM OF ALL RESPONSES TO PHQ9 QUESTIONS 1 & 2: 2

## 2023-06-28 ASSESSMENT — ENCOUNTER SYMPTOMS
SHORTNESS OF BREATH: 0
EYE PAIN: 0
COUGH: 0
NAUSEA: 1
ABDOMINAL PAIN: 1
SINUS PAIN: 0
BACK PAIN: 0
ABDOMINAL DISTENTION: 0
CONSTIPATION: 0
DIARRHEA: 1
EYE ITCHING: 0
APNEA: 0
COLOR CHANGE: 0
EYE REDNESS: 0
EYE DISCHARGE: 0

## 2023-06-30 LAB
BACTERIA SPEC CULT: ABNORMAL
SERVICE CMNT-IMP: ABNORMAL

## 2023-07-05 LAB
HEMOCCULT STL QL: NEGATIVE
VALID INTERNAL CONTROL: YES

## 2023-07-12 ENCOUNTER — NURSE ONLY (OUTPATIENT)
Dept: INTERNAL MEDICINE CLINIC | Facility: CLINIC | Age: 78
End: 2023-07-12
Payer: MEDICARE

## 2023-07-12 DIAGNOSIS — N39.0 RECURRENT UTI: ICD-10-CM

## 2023-07-12 DIAGNOSIS — R82.90 ABNORMAL FINDING ON URINALYSIS: Primary | ICD-10-CM

## 2023-07-12 DIAGNOSIS — R82.90 ABNORMAL FINDING ON URINALYSIS: ICD-10-CM

## 2023-07-12 PROCEDURE — 81003 URINALYSIS AUTO W/O SCOPE: CPT | Performed by: NURSE PRACTITIONER

## 2023-07-13 RX ORDER — SULFAMETHOXAZOLE AND TRIMETHOPRIM 800; 160 MG/1; MG/1
1 TABLET ORAL 2 TIMES DAILY
Qty: 14 TABLET | Refills: 0 | Status: SHIPPED | OUTPATIENT
Start: 2023-07-13 | End: 2023-07-20

## 2023-07-14 LAB
BACTERIA SPEC CULT: NORMAL
BACTERIA SPEC CULT: NORMAL
SERVICE CMNT-IMP: NORMAL

## 2023-07-21 ENCOUNTER — HOSPITAL ENCOUNTER (EMERGENCY)
Age: 78
Discharge: ANOTHER HOSPITAL | End: 2023-07-22
Attending: EMERGENCY MEDICINE
Payer: MEDICARE

## 2023-07-21 DIAGNOSIS — E11.65 HYPERGLYCEMIA DUE TO DIABETES MELLITUS (HCC): ICD-10-CM

## 2023-07-21 DIAGNOSIS — E87.5 HYPERKALEMIA: ICD-10-CM

## 2023-07-21 DIAGNOSIS — D64.9 ANEMIA, UNSPECIFIED TYPE: ICD-10-CM

## 2023-07-21 DIAGNOSIS — N17.9 AKI (ACUTE KIDNEY INJURY) (HCC): Primary | ICD-10-CM

## 2023-07-21 DIAGNOSIS — R10.13 EPIGASTRIC PAIN: ICD-10-CM

## 2023-07-21 PROCEDURE — 96365 THER/PROPH/DIAG IV INF INIT: CPT

## 2023-07-21 PROCEDURE — 96361 HYDRATE IV INFUSION ADD-ON: CPT

## 2023-07-21 PROCEDURE — 96366 THER/PROPH/DIAG IV INF ADDON: CPT

## 2023-07-21 PROCEDURE — 83690 ASSAY OF LIPASE: CPT

## 2023-07-21 PROCEDURE — 96375 TX/PRO/DX INJ NEW DRUG ADDON: CPT

## 2023-07-21 PROCEDURE — 99285 EMERGENCY DEPT VISIT HI MDM: CPT

## 2023-07-21 PROCEDURE — 85025 COMPLETE CBC W/AUTO DIFF WBC: CPT

## 2023-07-21 PROCEDURE — 80053 COMPREHEN METABOLIC PANEL: CPT

## 2023-07-21 ASSESSMENT — ENCOUNTER SYMPTOMS
BLOOD IN STOOL: 0
ABDOMINAL PAIN: 1
DIARRHEA: 0
BACK PAIN: 0
RESPIRATORY NEGATIVE: 1
VOMITING: 0
NAUSEA: 1

## 2023-07-22 ENCOUNTER — APPOINTMENT (OUTPATIENT)
Dept: CT IMAGING | Age: 78
End: 2023-07-22
Payer: MEDICARE

## 2023-07-22 ENCOUNTER — HOSPITAL ENCOUNTER (INPATIENT)
Age: 78
LOS: 3 days | Discharge: HOME | DRG: 726 | End: 2023-07-25
Attending: FAMILY MEDICINE | Admitting: FAMILY MEDICINE
Payer: MEDICARE

## 2023-07-22 VITALS
OXYGEN SATURATION: 98 % | WEIGHT: 114 LBS | TEMPERATURE: 97.9 F | DIASTOLIC BLOOD PRESSURE: 84 MMHG | HEART RATE: 93 BPM | SYSTOLIC BLOOD PRESSURE: 142 MMHG | BODY MASS INDEX: 16.83 KG/M2 | RESPIRATION RATE: 17 BRPM

## 2023-07-22 DIAGNOSIS — I50.20 SYSTOLIC HEART FAILURE, UNSPECIFIED HF CHRONICITY (HCC): ICD-10-CM

## 2023-07-22 DIAGNOSIS — I25.5 ISCHEMIC CARDIOMYOPATHY: ICD-10-CM

## 2023-07-22 PROBLEM — F17.210 CIGARETTE NICOTINE DEPENDENCE WITHOUT COMPLICATION: Status: ACTIVE | Noted: 2019-05-16

## 2023-07-22 PROBLEM — E11.21 TYPE 2 DIABETES WITH NEPHROPATHY (HCC): Status: ACTIVE | Noted: 2018-11-02

## 2023-07-22 PROBLEM — N17.9 AKI (ACUTE KIDNEY INJURY) (HCC): Status: ACTIVE | Noted: 2021-08-04

## 2023-07-22 PROBLEM — R10.9 ABDOMINAL PAIN: Status: ACTIVE | Noted: 2023-07-22

## 2023-07-22 LAB
ALBUMIN SERPL-MCNC: 3.5 G/DL (ref 3.2–4.6)
ALBUMIN/GLOB SERPL: 1.2 (ref 0.4–1.6)
ALP SERPL-CCNC: 62 U/L (ref 50–136)
ALT SERPL-CCNC: 44 U/L (ref 12–65)
ANION GAP SERPL CALC-SCNC: 6 MMOL/L (ref 2–11)
ANION GAP SERPL CALC-SCNC: 6 MMOL/L (ref 2–11)
APPEARANCE UR: CLEAR
AST SERPL-CCNC: 48 U/L (ref 15–37)
BACTERIA URNS QL MICRO: ABNORMAL /HPF
BASOPHILS # BLD: 0 K/UL (ref 0–0.2)
BASOPHILS NFR BLD: 0 % (ref 0–2)
BILIRUB SERPL-MCNC: 0.4 MG/DL (ref 0.2–1.1)
BILIRUB UR QL: NEGATIVE
BUN SERPL-MCNC: 102 MG/DL (ref 8–23)
BUN SERPL-MCNC: 117 MG/DL (ref 8–23)
CALCIUM SERPL-MCNC: 8.1 MG/DL (ref 8.3–10.4)
CALCIUM SERPL-MCNC: 8.5 MG/DL (ref 8.3–10.4)
CASTS URNS QL MICRO: ABNORMAL /LPF
CHLORIDE SERPL-SCNC: 115 MMOL/L (ref 101–110)
CHLORIDE SERPL-SCNC: 118 MMOL/L (ref 101–110)
CO2 SERPL-SCNC: 17 MMOL/L (ref 21–32)
CO2 SERPL-SCNC: 18 MMOL/L (ref 21–32)
COLOR UR: ABNORMAL
CREAT SERPL-MCNC: 3.1 MG/DL (ref 0.8–1.5)
CREAT SERPL-MCNC: 4.01 MG/DL (ref 0.8–1.5)
CREAT UR-MCNC: 48 MG/DL
DIFFERENTIAL METHOD BLD: ABNORMAL
EKG ATRIAL RATE: 80 BPM
EKG DIAGNOSIS: NORMAL
EKG P AXIS: 52 DEGREES
EKG P-R INTERVAL: 157 MS
EKG Q-T INTERVAL: 431 MS
EKG QRS DURATION: 119 MS
EKG QTC CALCULATION (BAZETT): 501 MS
EKG R AXIS: -30 DEGREES
EKG T AXIS: 98 DEGREES
EKG VENTRICULAR RATE: 81 BPM
EOSINOPHIL # BLD: 0.2 K/UL (ref 0–0.8)
EOSINOPHIL NFR BLD: 2 % (ref 0.5–7.8)
EPI CELLS #/AREA URNS HPF: ABNORMAL /HPF
ERYTHROCYTE [DISTWIDTH] IN BLOOD BY AUTOMATED COUNT: 15.1 % (ref 11.9–14.6)
ERYTHROCYTE [DISTWIDTH] IN BLOOD BY AUTOMATED COUNT: 15.3 % (ref 11.9–14.6)
EST. AVERAGE GLUCOSE BLD GHB EST-MCNC: 123 MG/DL
GLOBULIN SER CALC-MCNC: 3 G/DL (ref 2.8–4.5)
GLUCOSE BLD STRIP.AUTO-MCNC: 138 MG/DL (ref 65–100)
GLUCOSE BLD STRIP.AUTO-MCNC: 146 MG/DL (ref 65–100)
GLUCOSE BLD STRIP.AUTO-MCNC: 224 MG/DL (ref 65–100)
GLUCOSE BLD STRIP.AUTO-MCNC: 235 MG/DL (ref 65–100)
GLUCOSE BLD STRIP.AUTO-MCNC: 392 MG/DL (ref 65–100)
GLUCOSE SERPL-MCNC: 128 MG/DL (ref 65–100)
GLUCOSE SERPL-MCNC: 290 MG/DL (ref 65–100)
GLUCOSE UR STRIP.AUTO-MCNC: NEGATIVE MG/DL
HBA1C MFR BLD: 5.9 % (ref 4.8–5.6)
HCT VFR BLD AUTO: 25.8 % (ref 41.1–50.3)
HCT VFR BLD AUTO: 26.9 % (ref 41.1–50.3)
HGB BLD-MCNC: 8.1 G/DL (ref 13.6–17.2)
HGB BLD-MCNC: 8.5 G/DL (ref 13.6–17.2)
HGB UR QL STRIP: ABNORMAL
IMM GRANULOCYTES # BLD AUTO: 0.1 K/UL (ref 0–0.5)
IMM GRANULOCYTES NFR BLD AUTO: 1 % (ref 0–5)
KETONES UR QL STRIP.AUTO: NEGATIVE MG/DL
LEUKOCYTE ESTERASE UR QL STRIP.AUTO: ABNORMAL
LIPASE SERPL-CCNC: 37 U/L (ref 73–393)
LIPASE SERPL-CCNC: 42 U/L (ref 73–393)
LYMPHOCYTES # BLD: 1.2 K/UL (ref 0.5–4.6)
LYMPHOCYTES NFR BLD: 16 % (ref 13–44)
MCH RBC QN AUTO: 31.2 PG (ref 26.1–32.9)
MCH RBC QN AUTO: 31.8 PG (ref 26.1–32.9)
MCHC RBC AUTO-ENTMCNC: 31.4 G/DL (ref 31.4–35)
MCHC RBC AUTO-ENTMCNC: 31.6 G/DL (ref 31.4–35)
MCV RBC AUTO: 100.7 FL (ref 82–102)
MCV RBC AUTO: 99.2 FL (ref 82–102)
MONOCYTES # BLD: 0.5 K/UL (ref 0.1–1.3)
MONOCYTES NFR BLD: 7 % (ref 4–12)
NEUTS SEG # BLD: 5.2 K/UL (ref 1.7–8.2)
NEUTS SEG NFR BLD: 73 % (ref 43–78)
NITRITE UR QL STRIP.AUTO: POSITIVE
NRBC # BLD: 0 K/UL (ref 0–0.2)
NRBC # BLD: 0 K/UL (ref 0–0.2)
PH UR STRIP: 5 (ref 5–9)
PLATELET # BLD AUTO: 100 K/UL (ref 150–450)
PLATELET # BLD AUTO: 101 K/UL (ref 150–450)
PMV BLD AUTO: 10.3 FL (ref 9.4–12.3)
PMV BLD AUTO: 11 FL (ref 9.4–12.3)
POTASSIUM SERPL-SCNC: 4.6 MMOL/L (ref 3.5–5.1)
POTASSIUM SERPL-SCNC: 4.8 MMOL/L (ref 3.5–5.1)
POTASSIUM SERPL-SCNC: 5.7 MMOL/L (ref 3.5–5.1)
PROT SERPL-MCNC: 6.5 G/DL (ref 6.3–8.2)
PROT UR STRIP-MCNC: NEGATIVE MG/DL
RBC # BLD AUTO: 2.6 M/UL (ref 4.23–5.6)
RBC # BLD AUTO: 2.67 M/UL (ref 4.23–5.6)
RBC #/AREA URNS HPF: ABNORMAL /HPF
SERVICE CMNT-IMP: ABNORMAL
SODIUM SERPL-SCNC: 139 MMOL/L (ref 133–143)
SODIUM SERPL-SCNC: 141 MMOL/L (ref 133–143)
SODIUM UR-SCNC: 72 MMOL/L
SP GR UR REFRACTOMETRY: 1.01 (ref 1–1.02)
UROBILINOGEN UR QL STRIP.AUTO: 0.2 EU/DL (ref 0.2–1)
WBC # BLD AUTO: 7.1 K/UL (ref 4.3–11.1)
WBC # BLD AUTO: 8.6 K/UL (ref 4.3–11.1)
WBC URNS QL MICRO: ABNORMAL /HPF

## 2023-07-22 PROCEDURE — 96361 HYDRATE IV INFUSION ADD-ON: CPT

## 2023-07-22 PROCEDURE — 6360000002 HC RX W HCPCS: Performed by: FAMILY MEDICINE

## 2023-07-22 PROCEDURE — 74176 CT ABD & PELVIS W/O CONTRAST: CPT

## 2023-07-22 PROCEDURE — 85027 COMPLETE CBC AUTOMATED: CPT

## 2023-07-22 PROCEDURE — 2580000003 HC RX 258: Performed by: FAMILY MEDICINE

## 2023-07-22 PROCEDURE — 83036 HEMOGLOBIN GLYCOSYLATED A1C: CPT

## 2023-07-22 PROCEDURE — 84132 ASSAY OF SERUM POTASSIUM: CPT

## 2023-07-22 PROCEDURE — 1100000003 HC PRIVATE W/ TELEMETRY

## 2023-07-22 PROCEDURE — 87040 BLOOD CULTURE FOR BACTERIA: CPT

## 2023-07-22 PROCEDURE — 2580000003 HC RX 258: Performed by: HOSPITALIST

## 2023-07-22 PROCEDURE — 82962 GLUCOSE BLOOD TEST: CPT

## 2023-07-22 PROCEDURE — 2580000003 HC RX 258: Performed by: EMERGENCY MEDICINE

## 2023-07-22 PROCEDURE — 82570 ASSAY OF URINE CREATININE: CPT

## 2023-07-22 PROCEDURE — 96365 THER/PROPH/DIAG IV INF INIT: CPT

## 2023-07-22 PROCEDURE — 80048 BASIC METABOLIC PNL TOTAL CA: CPT

## 2023-07-22 PROCEDURE — 6370000000 HC RX 637 (ALT 250 FOR IP): Performed by: FAMILY MEDICINE

## 2023-07-22 PROCEDURE — 6370000000 HC RX 637 (ALT 250 FOR IP): Performed by: EMERGENCY MEDICINE

## 2023-07-22 PROCEDURE — 36415 COLL VENOUS BLD VENIPUNCTURE: CPT

## 2023-07-22 PROCEDURE — 2500000003 HC RX 250 WO HCPCS: Performed by: EMERGENCY MEDICINE

## 2023-07-22 PROCEDURE — 93005 ELECTROCARDIOGRAM TRACING: CPT | Performed by: EMERGENCY MEDICINE

## 2023-07-22 PROCEDURE — 6370000000 HC RX 637 (ALT 250 FOR IP): Performed by: HOSPITALIST

## 2023-07-22 PROCEDURE — 83690 ASSAY OF LIPASE: CPT

## 2023-07-22 PROCEDURE — 51702 INSERT TEMP BLADDER CATH: CPT

## 2023-07-22 PROCEDURE — 84300 ASSAY OF URINE SODIUM: CPT

## 2023-07-22 PROCEDURE — 96366 THER/PROPH/DIAG IV INF ADDON: CPT

## 2023-07-22 PROCEDURE — 93010 ELECTROCARDIOGRAM REPORT: CPT | Performed by: INTERNAL MEDICINE

## 2023-07-22 PROCEDURE — 81001 URINALYSIS AUTO W/SCOPE: CPT

## 2023-07-22 PROCEDURE — 87086 URINE CULTURE/COLONY COUNT: CPT

## 2023-07-22 PROCEDURE — 96375 TX/PRO/DX INJ NEW DRUG ADDON: CPT

## 2023-07-22 PROCEDURE — 87186 SC STD MICRODIL/AGAR DIL: CPT

## 2023-07-22 PROCEDURE — 87088 URINE BACTERIA CULTURE: CPT

## 2023-07-22 RX ORDER — CARVEDILOL 3.12 MG/1
6.25 TABLET ORAL 2 TIMES DAILY
Status: DISCONTINUED | OUTPATIENT
Start: 2023-07-22 | End: 2023-07-23

## 2023-07-22 RX ORDER — DEXTROSE MONOHYDRATE 100 MG/ML
INJECTION, SOLUTION INTRAVENOUS CONTINUOUS PRN
Status: DISCONTINUED | OUTPATIENT
Start: 2023-07-22 | End: 2023-07-25 | Stop reason: HOSPADM

## 2023-07-22 RX ORDER — TAMSULOSIN HYDROCHLORIDE 0.4 MG/1
0.4 CAPSULE ORAL DAILY
Status: DISCONTINUED | OUTPATIENT
Start: 2023-07-22 | End: 2023-07-25 | Stop reason: HOSPADM

## 2023-07-22 RX ORDER — CALCIUM CHLORIDE 100 MG/ML
1000 INJECTION INTRAVENOUS; INTRAVENTRICULAR ONCE
Status: COMPLETED | OUTPATIENT
Start: 2023-07-22 | End: 2023-07-22

## 2023-07-22 RX ORDER — SODIUM CHLORIDE 0.9 % (FLUSH) 0.9 %
5-40 SYRINGE (ML) INJECTION PRN
Status: DISCONTINUED | OUTPATIENT
Start: 2023-07-22 | End: 2023-07-25 | Stop reason: HOSPADM

## 2023-07-22 RX ORDER — CALCIUM GLUCONATE 94 MG/ML
1000 INJECTION, SOLUTION INTRAVENOUS
Status: DISCONTINUED | OUTPATIENT
Start: 2023-07-22 | End: 2023-07-22

## 2023-07-22 RX ORDER — ONDANSETRON 4 MG/1
4 TABLET, ORALLY DISINTEGRATING ORAL EVERY 8 HOURS PRN
Status: DISCONTINUED | OUTPATIENT
Start: 2023-07-22 | End: 2023-07-25 | Stop reason: HOSPADM

## 2023-07-22 RX ORDER — MAGNESIUM HYDROXIDE/ALUMINUM HYDROXICE/SIMETHICONE 120; 1200; 1200 MG/30ML; MG/30ML; MG/30ML
30 SUSPENSION ORAL EVERY 6 HOURS PRN
Status: DISCONTINUED | OUTPATIENT
Start: 2023-07-22 | End: 2023-07-25 | Stop reason: HOSPADM

## 2023-07-22 RX ORDER — CLOPIDOGREL BISULFATE 75 MG/1
75 TABLET ORAL DAILY
Status: DISCONTINUED | OUTPATIENT
Start: 2023-07-22 | End: 2023-07-25 | Stop reason: HOSPADM

## 2023-07-22 RX ORDER — DEXTROSE MONOHYDRATE 100 MG/ML
INJECTION, SOLUTION INTRAVENOUS CONTINUOUS PRN
Status: DISCONTINUED | OUTPATIENT
Start: 2023-07-22 | End: 2023-07-22 | Stop reason: HOSPADM

## 2023-07-22 RX ORDER — PANTOPRAZOLE SODIUM 40 MG/1
40 TABLET, DELAYED RELEASE ORAL
Status: DISCONTINUED | OUTPATIENT
Start: 2023-07-23 | End: 2023-07-25 | Stop reason: HOSPADM

## 2023-07-22 RX ORDER — SODIUM CHLORIDE 0.9 % (FLUSH) 0.9 %
5-40 SYRINGE (ML) INJECTION EVERY 12 HOURS SCHEDULED
Status: DISCONTINUED | OUTPATIENT
Start: 2023-07-22 | End: 2023-07-25 | Stop reason: HOSPADM

## 2023-07-22 RX ORDER — SODIUM CHLORIDE 9 MG/ML
INJECTION, SOLUTION INTRAVENOUS CONTINUOUS
Status: DISCONTINUED | OUTPATIENT
Start: 2023-07-22 | End: 2023-07-22

## 2023-07-22 RX ORDER — INSULIN LISPRO 100 [IU]/ML
0-4 INJECTION, SOLUTION INTRAVENOUS; SUBCUTANEOUS NIGHTLY
Status: DISCONTINUED | OUTPATIENT
Start: 2023-07-22 | End: 2023-07-25 | Stop reason: HOSPADM

## 2023-07-22 RX ORDER — ACETAMINOPHEN 650 MG/1
650 SUPPOSITORY RECTAL EVERY 6 HOURS PRN
Status: DISCONTINUED | OUTPATIENT
Start: 2023-07-22 | End: 2023-07-25 | Stop reason: HOSPADM

## 2023-07-22 RX ORDER — CARVEDILOL 12.5 MG/1
25 TABLET ORAL 2 TIMES DAILY
Status: DISCONTINUED | OUTPATIENT
Start: 2023-07-22 | End: 2023-07-22

## 2023-07-22 RX ORDER — HYDROCODONE BITARTRATE AND ACETAMINOPHEN 7.5; 325 MG/1; MG/1
1 TABLET ORAL EVERY 4 HOURS PRN
Status: DISCONTINUED | OUTPATIENT
Start: 2023-07-22 | End: 2023-07-25 | Stop reason: HOSPADM

## 2023-07-22 RX ORDER — INSULIN LISPRO 100 [IU]/ML
0-8 INJECTION, SOLUTION INTRAVENOUS; SUBCUTANEOUS
Status: DISCONTINUED | OUTPATIENT
Start: 2023-07-22 | End: 2023-07-25 | Stop reason: HOSPADM

## 2023-07-22 RX ORDER — LIDOCAINE HYDROCHLORIDE 20 MG/ML
JELLY TOPICAL ONCE
Status: COMPLETED | OUTPATIENT
Start: 2023-07-22 | End: 2023-07-22

## 2023-07-22 RX ORDER — SODIUM CHLORIDE 9 MG/ML
INJECTION, SOLUTION INTRAVENOUS CONTINUOUS
Status: ACTIVE | OUTPATIENT
Start: 2023-07-22 | End: 2023-07-23

## 2023-07-22 RX ORDER — HEPARIN SODIUM 5000 [USP'U]/ML
5000 INJECTION, SOLUTION INTRAVENOUS; SUBCUTANEOUS EVERY 8 HOURS SCHEDULED
Status: DISCONTINUED | OUTPATIENT
Start: 2023-07-22 | End: 2023-07-22

## 2023-07-22 RX ORDER — POLYETHYLENE GLYCOL 3350 17 G/17G
17 POWDER, FOR SOLUTION ORAL DAILY PRN
Status: DISCONTINUED | OUTPATIENT
Start: 2023-07-22 | End: 2023-07-25 | Stop reason: HOSPADM

## 2023-07-22 RX ORDER — SODIUM CHLORIDE 9 MG/ML
INJECTION, SOLUTION INTRAVENOUS PRN
Status: DISCONTINUED | OUTPATIENT
Start: 2023-07-22 | End: 2023-07-25 | Stop reason: HOSPADM

## 2023-07-22 RX ORDER — 0.9 % SODIUM CHLORIDE 0.9 %
1000 INTRAVENOUS SOLUTION INTRAVENOUS ONCE
Status: COMPLETED | OUTPATIENT
Start: 2023-07-22 | End: 2023-07-22

## 2023-07-22 RX ORDER — ACETAMINOPHEN 325 MG/1
650 TABLET ORAL EVERY 6 HOURS PRN
Status: DISCONTINUED | OUTPATIENT
Start: 2023-07-22 | End: 2023-07-25 | Stop reason: HOSPADM

## 2023-07-22 RX ORDER — LEVOTHYROXINE SODIUM 0.05 MG/1
25 TABLET ORAL DAILY
Status: DISCONTINUED | OUTPATIENT
Start: 2023-07-22 | End: 2023-07-25 | Stop reason: HOSPADM

## 2023-07-22 RX ORDER — MORPHINE SULFATE 2 MG/ML
2 INJECTION, SOLUTION INTRAMUSCULAR; INTRAVENOUS EVERY 4 HOURS PRN
Status: DISCONTINUED | OUTPATIENT
Start: 2023-07-22 | End: 2023-07-25 | Stop reason: HOSPADM

## 2023-07-22 RX ORDER — ASPIRIN 81 MG/1
81 TABLET, CHEWABLE ORAL DAILY
Status: DISCONTINUED | OUTPATIENT
Start: 2023-07-22 | End: 2023-07-25 | Stop reason: HOSPADM

## 2023-07-22 RX ORDER — ONDANSETRON 2 MG/ML
4 INJECTION INTRAMUSCULAR; INTRAVENOUS EVERY 6 HOURS PRN
Status: DISCONTINUED | OUTPATIENT
Start: 2023-07-22 | End: 2023-07-25 | Stop reason: HOSPADM

## 2023-07-22 RX ADMIN — CLOPIDOGREL BISULFATE 75 MG: 75 TABLET ORAL at 13:09

## 2023-07-22 RX ADMIN — LIDOCAINE HYDROCHLORIDE: 20 JELLY TOPICAL at 07:35

## 2023-07-22 RX ADMIN — INSULIN HUMAN 10 UNITS: 100 INJECTION, SOLUTION PARENTERAL at 01:25

## 2023-07-22 RX ADMIN — CALCIUM CHLORIDE 1000 MG: 100 INJECTION INTRAVENOUS; INTRAVENTRICULAR at 01:26

## 2023-07-22 RX ADMIN — BARIUM SULFATE 450 ML: 20 SUSPENSION ORAL at 02:45

## 2023-07-22 RX ADMIN — DEXTROSE MONOHYDRATE 250 ML: 100 INJECTION, SOLUTION INTRAVENOUS at 01:26

## 2023-07-22 RX ADMIN — INSULIN LISPRO 2 UNITS: 100 INJECTION, SOLUTION INTRAVENOUS; SUBCUTANEOUS at 13:09

## 2023-07-22 RX ADMIN — LEVOTHYROXINE SODIUM 25 MCG: 0.05 TABLET ORAL at 13:09

## 2023-07-22 RX ADMIN — CEFTRIAXONE 1000 MG: 1 INJECTION, POWDER, FOR SOLUTION INTRAMUSCULAR; INTRAVENOUS at 09:11

## 2023-07-22 RX ADMIN — SODIUM CHLORIDE: 9 INJECTION, SOLUTION INTRAVENOUS at 11:33

## 2023-07-22 RX ADMIN — TAMSULOSIN HYDROCHLORIDE 0.4 MG: 0.4 CAPSULE ORAL at 13:09

## 2023-07-22 RX ADMIN — SODIUM CHLORIDE 1000 ML: 9 INJECTION, SOLUTION INTRAVENOUS at 00:35

## 2023-07-22 RX ADMIN — SODIUM CHLORIDE: 9 INJECTION, SOLUTION INTRAVENOUS at 07:24

## 2023-07-22 RX ADMIN — ASPIRIN 81 MG 81 MG: 81 TABLET ORAL at 13:09

## 2023-07-22 RX ADMIN — SODIUM CHLORIDE, PRESERVATIVE FREE 10 ML: 5 INJECTION INTRAVENOUS at 20:42

## 2023-07-22 RX ADMIN — SODIUM CHLORIDE, PRESERVATIVE FREE 10 ML: 5 INJECTION INTRAVENOUS at 09:13

## 2023-07-22 RX ADMIN — ACETAMINOPHEN 650 MG: 325 TABLET ORAL at 20:46

## 2023-07-22 ASSESSMENT — PAIN SCALES - GENERAL
PAINLEVEL_OUTOF10: 2
PAINLEVEL_OUTOF10: 0

## 2023-07-22 ASSESSMENT — PAIN DESCRIPTION - LOCATION: LOCATION: PENIS

## 2023-07-22 ASSESSMENT — PAIN DESCRIPTION - DESCRIPTORS: DESCRIPTORS: ACHING

## 2023-07-22 NOTE — ED TRIAGE NOTES
Pt. Alert and oriented and ambulatory to triage. Pt. C/o UTI and abdominal pain that has ongoing for months. Pt. States being seen by PCP and being scheduled for C. Pt. States unable to go to CT due to insurance issues.

## 2023-07-22 NOTE — ED PROVIDER NOTES
Emergency Department Provider Note       PCP: Jorge Westfall, APRN - JESSICA   Age: 65 y.o. Sex: male     DISPOSITION Decision To Transfer 07/22/2023 04:06:54 AM       ICD-10-CM    1. JESSICA (acute kidney injury) ([address])  N17.9       2. Hyperkalemia  E87.5       3. Hyperglycemia due to diabetes mellitus ([address])  E11.65       4. Anemia, unspecified type  D64.9       5. Epigastric pain  R10.13           Medical Decision Making     Complexity of Problems Addressed:  1 or more chronic illnesses with a severe exacerbation or progression. 1 or more acute illnesses that pose a threat to life or bodily function. Data Reviewed and Analyzed:  Category 1:   I independently ordered and reviewed each unique test.  I reviewed external records: provider visit note from PCP. Category 2:   I interpreted the CT Scan the abdomen pelvis without any acute perforation, bowel distention, small bowel obstruction. Agree with radiologist interpretation. I interpreted the labs. Category 3: Discussion of management or test interpretation. Abnormal weight loss. Upper abdominal pain. Not eating secondary to pain. 1 pack. Smoker for many years. No cough, congestion, fever. He does have abnormal weight loss. Will obtain CT abdomen pelvis for assessment. For ulcer disease, acid reflux given presentation however do have concern for malignancy due to abnormal weight loss    4:37 AM  Due to elevated potassium calcium chloride 1 g IV given. Insulin 10 units given. Hyperkalemia protocol initiated. Likely secondary to acute renal dysfunction. He is also hyperglycemic without acute diabetic ketoacidosis at this time. EKG without any concerning findings. Potassium improved with treatment initiation here. Given his multitude of issues I discussed the case with the hospitalist.  I feel he would benefit from transfer to [facility] for nephrology and GI involvement.   CT was obtained without any acute perforation, bowel coronal reformatted images without contrast enhancement. The exam is limited   because some types of pathology may not be adequately demonstrated due to lack   of contrast enhancement. CT dose lowering techniques were used, to include:   automated exposure control, adjustment for patient size, and or use of iterative   reconstruction. FINDINGS:    LOWER CHEST :  Normal.    ABDOMEN:    Liver and Biliary system:  Normal.    Gallbladder:  Normal.    Spleen:  Normal.    Pancreas:  Marked pancreatic calcifications. Adrenal glands:  Normal.    Kidneys and ureters:  Normal. No masses or inflammatory process. No   urolithiasis. Aorta/IVC/vascular:   Atherosclerotic aorta and iliac arteries. No aortic   aneurysm or dissection. Diffuse atherosclerosis IVC normal.    Lymph nodes:  No significant lymphadenopathy. Stomach: Normal    Bowel: No obstruction, free air, or ascites. No mucosal thickening. Appendix:. Not visualized    Peritoneum/Mesentery:  Normal.    Abdominal wall:  Normal.    PELVIS:  Urinary bladder:  Marked bladder distention. Reproductive organs:  Normal.    Lymph Nodes:  Normal.    BONES:  Diffuse degenerative disc disease from L1 to S1.    ADDITIONAL  SIGNIFICANT FINDINGS:  None. Impression    1. Marked chronic pancreatitis. 2.  Diffuse atherosclerosis. 3.  Moderate to marked bladder distention. 4.  Diffuse degenerative disc disease from L1 to S1.      Suman Bojorquez M.D.   7/22/2023 3:47:00 AM   CBC with Diff   Result Value Ref Range    WBC 7.1 4.3 - 11.1 K/uL    RBC 2.60 (L) 4.23 - 5.6 M/uL    Hemoglobin 8.1 (L) 13.6 - 17.2 g/dL    Hematocrit 25.8 (L) 41.1 - 50.3 %    MCV 99.2 82.0 - 102.0 FL    MCH 31.2 26.1 - 32.9 PG    MCHC 31.4 31.4 - 35.0 g/dL    RDW 15.1 (H) 11.9 - 14.6 %    Platelets 524 (L) 359 - 450 K/uL    MPV 11.0 9.4 - 12.3 FL    nRBC 0.00 0.0 - 0.2 K/uL    Differential Type AUTOMATED      Neutrophils % 73 43 - 78 %    Lymphocytes % 16 13 - 44 %    Monocytes % 7

## 2023-07-22 NOTE — H&P
SIGNIFICANT FINDINGS:  None. 1. Marked chronic pancreatitis. 2.  Diffuse atherosclerosis. 3.  Moderate to marked bladder distention. 4.  Diffuse degenerative disc disease from L1 to S1. Terrence Ace M.D. 7/22/2023 3:47:00 AM        Signed:  Manda Montes MD    Part of this note may have been written by using a voice dictation software. The note has been proof read but may still contain some grammatical/other typographical errors.

## 2023-07-22 NOTE — ED NOTES
Pt provided with urinal to attempt and collect urine sample at this time.       Rody Jarrell, RONNY  07/21/23 8304

## 2023-07-23 PROBLEM — E44.0 MODERATE PROTEIN-CALORIE MALNUTRITION (HCC): Status: ACTIVE | Noted: 2023-07-23

## 2023-07-23 LAB
ANION GAP SERPL CALC-SCNC: 6 MMOL/L (ref 2–11)
BUN SERPL-MCNC: 86 MG/DL (ref 8–23)
CALCIUM SERPL-MCNC: 8.1 MG/DL (ref 8.3–10.4)
CHLORIDE SERPL-SCNC: 124 MMOL/L (ref 101–110)
CO2 SERPL-SCNC: 18 MMOL/L (ref 21–32)
CREAT SERPL-MCNC: 2 MG/DL (ref 0.8–1.5)
GLUCOSE BLD STRIP.AUTO-MCNC: 107 MG/DL (ref 65–100)
GLUCOSE BLD STRIP.AUTO-MCNC: 192 MG/DL (ref 65–100)
GLUCOSE BLD STRIP.AUTO-MCNC: 237 MG/DL (ref 65–100)
GLUCOSE BLD STRIP.AUTO-MCNC: 266 MG/DL (ref 65–100)
GLUCOSE SERPL-MCNC: 113 MG/DL (ref 65–100)
POTASSIUM SERPL-SCNC: 4.5 MMOL/L (ref 3.5–5.1)
SERVICE CMNT-IMP: ABNORMAL
SODIUM SERPL-SCNC: 148 MMOL/L (ref 133–143)

## 2023-07-23 PROCEDURE — 1100000003 HC PRIVATE W/ TELEMETRY

## 2023-07-23 PROCEDURE — 2580000003 HC RX 258: Performed by: HOSPITALIST

## 2023-07-23 PROCEDURE — 6360000002 HC RX W HCPCS: Performed by: FAMILY MEDICINE

## 2023-07-23 PROCEDURE — 2580000003 HC RX 258: Performed by: FAMILY MEDICINE

## 2023-07-23 PROCEDURE — 97530 THERAPEUTIC ACTIVITIES: CPT

## 2023-07-23 PROCEDURE — 51702 INSERT TEMP BLADDER CATH: CPT

## 2023-07-23 PROCEDURE — 80048 BASIC METABOLIC PNL TOTAL CA: CPT

## 2023-07-23 PROCEDURE — 97165 OT EVAL LOW COMPLEX 30 MIN: CPT

## 2023-07-23 PROCEDURE — 97161 PT EVAL LOW COMPLEX 20 MIN: CPT

## 2023-07-23 PROCEDURE — 97535 SELF CARE MNGMENT TRAINING: CPT

## 2023-07-23 PROCEDURE — 6370000000 HC RX 637 (ALT 250 FOR IP): Performed by: HOSPITALIST

## 2023-07-23 PROCEDURE — 82962 GLUCOSE BLOOD TEST: CPT

## 2023-07-23 PROCEDURE — 36415 COLL VENOUS BLD VENIPUNCTURE: CPT

## 2023-07-23 RX ORDER — DEXTROSE MONOHYDRATE 50 MG/ML
INJECTION, SOLUTION INTRAVENOUS CONTINUOUS
Status: ACTIVE | OUTPATIENT
Start: 2023-07-23 | End: 2023-07-24

## 2023-07-23 RX ORDER — CARVEDILOL 3.12 MG/1
3.12 TABLET ORAL 2 TIMES DAILY
Status: DISCONTINUED | OUTPATIENT
Start: 2023-07-23 | End: 2023-07-25 | Stop reason: HOSPADM

## 2023-07-23 RX ADMIN — SODIUM CHLORIDE, PRESERVATIVE FREE 10 ML: 5 INJECTION INTRAVENOUS at 08:59

## 2023-07-23 RX ADMIN — SODIUM CHLORIDE, PRESERVATIVE FREE 10 ML: 5 INJECTION INTRAVENOUS at 21:00

## 2023-07-23 RX ADMIN — LEVOTHYROXINE SODIUM 25 MCG: 0.05 TABLET ORAL at 05:33

## 2023-07-23 RX ADMIN — PANTOPRAZOLE SODIUM 40 MG: 40 TABLET, DELAYED RELEASE ORAL at 05:33

## 2023-07-23 RX ADMIN — SODIUM CHLORIDE: 9 INJECTION, SOLUTION INTRAVENOUS at 00:10

## 2023-07-23 RX ADMIN — CLOPIDOGREL BISULFATE 75 MG: 75 TABLET ORAL at 08:58

## 2023-07-23 RX ADMIN — ASPIRIN 81 MG 81 MG: 81 TABLET ORAL at 08:59

## 2023-07-23 RX ADMIN — CEFTRIAXONE 1000 MG: 1 INJECTION, POWDER, FOR SOLUTION INTRAMUSCULAR; INTRAVENOUS at 08:57

## 2023-07-23 RX ADMIN — DEXTROSE MONOHYDRATE: 50 INJECTION, SOLUTION INTRAVENOUS at 13:17

## 2023-07-23 RX ADMIN — TAMSULOSIN HYDROCHLORIDE 0.4 MG: 0.4 CAPSULE ORAL at 18:16

## 2023-07-23 RX ADMIN — CARVEDILOL 6.25 MG: 3.12 TABLET, FILM COATED ORAL at 08:58

## 2023-07-23 ASSESSMENT — PAIN SCALES - GENERAL
PAINLEVEL_OUTOF10: 0

## 2023-07-23 NOTE — CONSULTS
Comprehensive Nutrition Assessment    Type and Reason for Visit: Initial, Consult, Positive Nutrition Screen  Malnutrition Screening Tool: Malnutrition Screen  Have you recently lost weight without trying?: 24 to 33 pounds (3 points)  Have you been eating poorly because of a decreased appetite?: Yes (1 point)  Malnutrition Screening Tool Score: 4 and General Nutrition Management/other reason (Hospitalists)    Nutrition Recommendations/Plan:   Meals and Snacks:  Diet: Continue current order  Nutrition Supplement Therapy:  Medical food supplement therapy:  Initiate Glucerna Shake three times per day (this provides 220 kcal and 10 grams protein per bottle)     Malnutrition Assessment:  Malnutrition Status: Moderate malnutrition  Context: Chronic Illness  Findings of clinical characteristics of malnutrition:   Energy Intake:  Mild decrease in energy intake (Comment) (2 meals at baselin, acute decline due to abdominal pain)  Weight Loss:  Mild weight loss (specify amount and time period) (13% loss in 13 months)     Body Fat Loss:  Mild body fat loss Triceps, Fat Overlying Ribs, Buccal region   Muscle Mass Loss:  Severe muscle mass loss Temples (temporalis), Clavicles (pectoralis & deltoids), Hand (interosseous), Scapula (trapezius)  Fluid Accumulation:  No significant fluid accumulation     Strength:  Not Performed     Nutrition Assessment:  Nutrition History: Patient reports cereal for breakfast and cooks various things for dinner. He also states that he will sometimes get up in the night and make a bologna sandwich. He used to drink Ensure but can no longer afford. He endorses weight history and states that he was even in the 160s before his heart surgery. Do You Have Any Cultural, Uatsdin, or Ethnic Food Preferences?: No   Nutrition Background:       Patient with PMH DM, GERD, HTN, tobacco abuse, COPD, CHF, CKD, CAD s/p CABG, HLD, BPH.  He presented with worsening abdominal pain and was admitted with UTI

## 2023-07-23 NOTE — CARE COORDINATION
Pt chart reviewed for discharge planning. CM met with pt at bedside, verified demographic information/ health insurance. Pt lives alone in a one level apartment, is independent with ADLs, ambulates with a cane as needed, and drives. PCP was confirmed, last seen a week ago in office and reports no outside services in the home. CM will follow pt plan of care and assist with supportive care referrals pending pt clinical progress. Please consult case management if specific needs arise. 07/23/23 1207   Service Assessment   Patient Orientation Alert and Oriented   Cognition Alert   History Provided By Patient   Primary [address] Family Members   Patient's Healthcare Decision Maker is: Legal Next of [address] [place] [address]   PCP Verified by CM Yes  (Alberta Skelton, HAWK)   Last Visit to PCP Within last 3 months   Prior Functional Level Independent in ADLs/IADLs   Current Functional Level Independent in ADLs/IADLs   Can patient return to prior living arrangement Yes   Ability to make needs known: Good   Family able to assist with home care needs: Yes   Would you like for me to discuss the discharge plan with any other family members/significant others, and if so, who? No   Financial Resources Medicare  ([employer])   Community Resources None   Social/Functional History   Lives With Alone   Type of Home Apartment   Home Layout One level   Home Access Stairs to enter with rails   Entrance Stairs - Number of Steps 15   Bathroom Shower/Tub Tub/Shower unit   Home Equipment Cane;Walker, rolling   ADL Assistance Independent   Active  Yes   Occupation Retired   Discharge Planning   Type of [facility] Prior To Admission None   Potential Assistance Needed N/A   DME Ordered?  No   Potential Assistance Purchasing Medications No   Type of Home Care Services None   Patient expects to be discharged to: House   One/Two Story Residence One story   Condition of

## 2023-07-23 NOTE — PLAN OF CARE
Problem: Discharge Planning  Goal: Discharge to home or other facility with appropriate resources  Outcome: Progressing  Flowsheets (Taken 7/22/2023 1949)  Discharge to home or other facility with appropriate resources:   Identify barriers to discharge with patient and caregiver   Arrange for needed discharge resources and transportation as appropriate     Problem: ABCDS Injury Assessment  Goal: Absence of physical injury  Outcome: Progressing     Problem: Safety - Adult  Goal: Free from fall injury  Outcome: Progressing

## 2023-07-24 PROBLEM — N39.0 UTI (URINARY TRACT INFECTION): Status: ACTIVE | Noted: 2023-07-24

## 2023-07-24 PROBLEM — E87.0 HYPERNATREMIA: Status: ACTIVE | Noted: 2023-07-24

## 2023-07-24 LAB
ANION GAP SERPL CALC-SCNC: 4 MMOL/L (ref 2–11)
BACTERIA SPEC CULT: ABNORMAL
BACTERIA SPEC CULT: ABNORMAL
BUN SERPL-MCNC: 70 MG/DL (ref 8–23)
CALCIUM SERPL-MCNC: 7.3 MG/DL (ref 8.3–10.4)
CHLORIDE SERPL-SCNC: 122 MMOL/L (ref 101–110)
CO2 SERPL-SCNC: 18 MMOL/L (ref 21–32)
CREAT SERPL-MCNC: 2 MG/DL (ref 0.8–1.5)
GLUCOSE BLD STRIP.AUTO-MCNC: 126 MG/DL (ref 65–100)
GLUCOSE BLD STRIP.AUTO-MCNC: 162 MG/DL (ref 65–100)
GLUCOSE BLD STRIP.AUTO-MCNC: 202 MG/DL (ref 65–100)
GLUCOSE BLD STRIP.AUTO-MCNC: 208 MG/DL (ref 65–100)
GLUCOSE SERPL-MCNC: 212 MG/DL (ref 65–100)
POTASSIUM SERPL-SCNC: 4.5 MMOL/L (ref 3.5–5.1)
SERVICE CMNT-IMP: ABNORMAL
SODIUM SERPL-SCNC: 144 MMOL/L (ref 133–143)

## 2023-07-24 PROCEDURE — 80048 BASIC METABOLIC PNL TOTAL CA: CPT

## 2023-07-24 PROCEDURE — 36415 COLL VENOUS BLD VENIPUNCTURE: CPT

## 2023-07-24 PROCEDURE — 6370000000 HC RX 637 (ALT 250 FOR IP): Performed by: FAMILY MEDICINE

## 2023-07-24 PROCEDURE — 82962 GLUCOSE BLOOD TEST: CPT

## 2023-07-24 PROCEDURE — 2580000003 HC RX 258: Performed by: HOSPITALIST

## 2023-07-24 PROCEDURE — 6360000002 HC RX W HCPCS: Performed by: FAMILY MEDICINE

## 2023-07-24 PROCEDURE — 6370000000 HC RX 637 (ALT 250 FOR IP): Performed by: HOSPITALIST

## 2023-07-24 PROCEDURE — 97530 THERAPEUTIC ACTIVITIES: CPT

## 2023-07-24 PROCEDURE — 2580000003 HC RX 258: Performed by: FAMILY MEDICINE

## 2023-07-24 PROCEDURE — 1100000003 HC PRIVATE W/ TELEMETRY

## 2023-07-24 RX ORDER — DEXTROSE MONOHYDRATE 50 MG/ML
INJECTION, SOLUTION INTRAVENOUS CONTINUOUS
Status: ACTIVE | OUTPATIENT
Start: 2023-07-24 | End: 2023-07-25

## 2023-07-24 RX ORDER — SODIUM BICARBONATE 650 MG/1
1300 TABLET ORAL 2 TIMES DAILY
Status: DISCONTINUED | OUTPATIENT
Start: 2023-07-24 | End: 2023-07-25 | Stop reason: HOSPADM

## 2023-07-24 RX ADMIN — SODIUM BICARBONATE 650 MG TABLET 1300 MG: at 17:13

## 2023-07-24 RX ADMIN — ASPIRIN 81 MG 81 MG: 81 TABLET ORAL at 09:13

## 2023-07-24 RX ADMIN — CEFTRIAXONE 1000 MG: 1 INJECTION, POWDER, FOR SOLUTION INTRAMUSCULAR; INTRAVENOUS at 09:10

## 2023-07-24 RX ADMIN — SODIUM CHLORIDE, PRESERVATIVE FREE 10 ML: 5 INJECTION INTRAVENOUS at 09:13

## 2023-07-24 RX ADMIN — DEXTROSE MONOHYDRATE: 50 INJECTION, SOLUTION INTRAVENOUS at 17:14

## 2023-07-24 RX ADMIN — CLOPIDOGREL BISULFATE 75 MG: 75 TABLET ORAL at 09:13

## 2023-07-24 RX ADMIN — CARVEDILOL 3.12 MG: 3.12 TABLET, FILM COATED ORAL at 09:13

## 2023-07-24 RX ADMIN — DEXTROSE MONOHYDRATE: 50 INJECTION, SOLUTION INTRAVENOUS at 05:38

## 2023-07-24 RX ADMIN — LEVOTHYROXINE SODIUM 25 MCG: 0.05 TABLET ORAL at 05:34

## 2023-07-24 RX ADMIN — SODIUM CHLORIDE, PRESERVATIVE FREE 10 ML: 5 INJECTION INTRAVENOUS at 21:26

## 2023-07-24 RX ADMIN — TAMSULOSIN HYDROCHLORIDE 0.4 MG: 0.4 CAPSULE ORAL at 17:13

## 2023-07-24 RX ADMIN — PANTOPRAZOLE SODIUM 40 MG: 40 TABLET, DELAYED RELEASE ORAL at 05:34

## 2023-07-24 RX ADMIN — HYDROCODONE BITARTRATE AND ACETAMINOPHEN 1 TABLET: 7.5; 325 TABLET ORAL at 02:46

## 2023-07-24 RX ADMIN — INSULIN LISPRO 2 UNITS: 100 INJECTION, SOLUTION INTRAVENOUS; SUBCUTANEOUS at 17:13

## 2023-07-24 RX ADMIN — INSULIN LISPRO 2 UNITS: 100 INJECTION, SOLUTION INTRAVENOUS; SUBCUTANEOUS at 09:09

## 2023-07-24 ASSESSMENT — PAIN SCALES - GENERAL
PAINLEVEL_OUTOF10: 0
PAINLEVEL_OUTOF10: 7

## 2023-07-24 ASSESSMENT — PAIN SCALES - WONG BAKER: WONGBAKER_NUMERICALRESPONSE: 0

## 2023-07-24 NOTE — CARE COORDINATION
CM reviewed pt chart for continued stay. LMSW met with pt this afternoon about his anticipated discharge home tomorrow. Pt did well with therapy today and although HH was initially recommended pt has improved and per verbal conversation with therapist, pt is most likely at his functional baseline. Pt reports that he was told by ES staff that he would be transported back to [facility] parking lot at discharge so he can drive his truck home from there as he drove himself to the hospital on 7/21/2023. LMSW will continue to follow pt plan of care and assist further with supportive care needs as appropriate.

## 2023-07-25 VITALS
WEIGHT: 147.71 LBS | SYSTOLIC BLOOD PRESSURE: 121 MMHG | OXYGEN SATURATION: 100 % | BODY MASS INDEX: 21.88 KG/M2 | HEIGHT: 69 IN | DIASTOLIC BLOOD PRESSURE: 48 MMHG | TEMPERATURE: 97.7 F | HEART RATE: 91 BPM | RESPIRATION RATE: 20 BRPM

## 2023-07-25 PROBLEM — E87.8 LOW BICARBONATE: Status: ACTIVE | Noted: 2023-07-25

## 2023-07-25 LAB
ANION GAP SERPL CALC-SCNC: 4 MMOL/L (ref 2–11)
ANION GAP SERPL CALC-SCNC: 4 MMOL/L (ref 2–11)
BUN SERPL-MCNC: 62 MG/DL (ref 8–23)
BUN SERPL-MCNC: 67 MG/DL (ref 8–23)
CALCIUM SERPL-MCNC: 7.7 MG/DL (ref 8.3–10.4)
CALCIUM SERPL-MCNC: 7.8 MG/DL (ref 8.3–10.4)
CHLORIDE SERPL-SCNC: 120 MMOL/L (ref 101–110)
CHLORIDE SERPL-SCNC: 124 MMOL/L (ref 101–110)
CO2 SERPL-SCNC: 17 MMOL/L (ref 21–32)
CO2 SERPL-SCNC: 18 MMOL/L (ref 21–32)
CREAT SERPL-MCNC: 2 MG/DL (ref 0.8–1.5)
CREAT SERPL-MCNC: 2.2 MG/DL (ref 0.8–1.5)
GLUCOSE BLD STRIP.AUTO-MCNC: 196 MG/DL (ref 65–100)
GLUCOSE BLD STRIP.AUTO-MCNC: 255 MG/DL (ref 65–100)
GLUCOSE SERPL-MCNC: 100 MG/DL (ref 65–100)
GLUCOSE SERPL-MCNC: 204 MG/DL (ref 65–100)
POTASSIUM SERPL-SCNC: 4.4 MMOL/L (ref 3.5–5.1)
POTASSIUM SERPL-SCNC: 4.7 MMOL/L (ref 3.5–5.1)
SERVICE CMNT-IMP: ABNORMAL
SERVICE CMNT-IMP: ABNORMAL
SODIUM SERPL-SCNC: 142 MMOL/L (ref 133–143)
SODIUM SERPL-SCNC: 145 MMOL/L (ref 133–143)

## 2023-07-25 PROCEDURE — 97530 THERAPEUTIC ACTIVITIES: CPT

## 2023-07-25 PROCEDURE — 2580000003 HC RX 258: Performed by: FAMILY MEDICINE

## 2023-07-25 PROCEDURE — 6360000002 HC RX W HCPCS: Performed by: FAMILY MEDICINE

## 2023-07-25 PROCEDURE — 80048 BASIC METABOLIC PNL TOTAL CA: CPT

## 2023-07-25 PROCEDURE — 82962 GLUCOSE BLOOD TEST: CPT

## 2023-07-25 PROCEDURE — 2500000003 HC RX 250 WO HCPCS: Performed by: FAMILY MEDICINE

## 2023-07-25 PROCEDURE — 6370000000 HC RX 637 (ALT 250 FOR IP): Performed by: HOSPITALIST

## 2023-07-25 PROCEDURE — 6370000000 HC RX 637 (ALT 250 FOR IP): Performed by: FAMILY MEDICINE

## 2023-07-25 PROCEDURE — 36415 COLL VENOUS BLD VENIPUNCTURE: CPT

## 2023-07-25 RX ORDER — CEFPODOXIME PROXETIL 100 MG/1
100 TABLET, FILM COATED ORAL 2 TIMES DAILY
Qty: 6 TABLET | Refills: 0 | Status: SHIPPED | OUTPATIENT
Start: 2023-07-25 | End: 2023-07-28

## 2023-07-25 RX ORDER — SODIUM CHLORIDE 9 MG/ML
INJECTION, SOLUTION INTRAVENOUS CONTINUOUS
Status: DISCONTINUED | OUTPATIENT
Start: 2023-07-25 | End: 2023-07-25 | Stop reason: HOSPADM

## 2023-07-25 RX ORDER — SODIUM BICARBONATE 650 MG/1
1300 TABLET ORAL 2 TIMES DAILY
Qty: 28 TABLET | Refills: 0 | Status: SHIPPED | OUTPATIENT
Start: 2023-07-25 | End: 2023-08-01

## 2023-07-25 RX ADMIN — LEVOTHYROXINE SODIUM 25 MCG: 0.05 TABLET ORAL at 05:13

## 2023-07-25 RX ADMIN — SODIUM BICARBONATE 650 MG TABLET 1300 MG: at 08:52

## 2023-07-25 RX ADMIN — INSULIN LISPRO 4 UNITS: 100 INJECTION, SOLUTION INTRAVENOUS; SUBCUTANEOUS at 12:31

## 2023-07-25 RX ADMIN — SODIUM CHLORIDE, PRESERVATIVE FREE 10 ML: 5 INJECTION INTRAVENOUS at 08:54

## 2023-07-25 RX ADMIN — CARVEDILOL 3.12 MG: 3.12 TABLET, FILM COATED ORAL at 08:52

## 2023-07-25 RX ADMIN — SODIUM BICARBONATE 50 MEQ: 84 INJECTION, SOLUTION INTRAVENOUS at 16:13

## 2023-07-25 RX ADMIN — SODIUM CHLORIDE: 9 INJECTION, SOLUTION INTRAVENOUS at 09:47

## 2023-07-25 RX ADMIN — CLOPIDOGREL BISULFATE 75 MG: 75 TABLET ORAL at 08:52

## 2023-07-25 RX ADMIN — PANTOPRAZOLE SODIUM 40 MG: 40 TABLET, DELAYED RELEASE ORAL at 05:13

## 2023-07-25 RX ADMIN — CEFTRIAXONE 1000 MG: 1 INJECTION, POWDER, FOR SOLUTION INTRAMUSCULAR; INTRAVENOUS at 08:52

## 2023-07-25 RX ADMIN — ASPIRIN 81 MG 81 MG: 81 TABLET ORAL at 08:52

## 2023-07-25 NOTE — DISCHARGE INSTRUCTIONS
F/u with PCP in a week with bmp and magnesium. Hold lisinopril , Demadex and potassium until a week-- PCP to decide to restart these medications based on labs. Can take Demadex with potassium tab if any new shortness of breath or any significant leg swelling noticed before seeing the PCP. Hold coreg if systolic blood pressure is less then 110 mmhg or heart rate less then 55/min.

## 2023-07-25 NOTE — DISCHARGE SUMMARY
BILITOT, BILIDIR, ALKPHOS, AST, ALT, PROT, LABALBU, GLOB in the last 72 hours.    Cardiac  Lab Results   Component Value Date/Time    TROPHS 11.6 03/11/2022 04:12 AM    TROPHS 4.8 09/25/2020 02:00 PM      Coags Lab Results   Component Value Date/Time    PROTIME 11.5 08/12/2019 03:29 PM    INR 1.1 08/12/2019 03:29 PM      A1c Lab Results   Component Value Date/Time    LABA1C 5.9 07/22/2023 10:15 AM    LABA1C 6.4 10/21/2022 10:57 AM    LABA1C 6.7 03/02/2022 11:15 AM     07/22/2023 10:15 AM     10/21/2022 10:57 AM     03/02/2022 11:15 AM      Lipids Lab Results   Component Value Date/Time    CHOL 104 10/21/2022 10:57 AM    LDLCALC 45 10/21/2022 10:57 AM    LABVLDL 11 10/21/2022 10:57 AM    HDL 48 10/21/2022 10:57 AM    CHOLHDLRATIO 2.2 10/21/2022 10:57 AM    TRIG 55 10/21/2022 10:57 AM      Thyroid  Lab Results   Component Value Date/Time    WYM7KAS 4.390 10/21/2022 10:57 AM        Most Recent UA Lab Results   Component Value Date/Time    COLORU YELLOW/STRAW 07/22/2023 03:57 AM    APPEARANCE CLEAR 07/22/2023 03:57 AM    SPECGRAV 1.011 07/22/2023 03:57 AM    LABPH 5.0 07/22/2023 03:57 AM    PROTEINU Negative 07/22/2023 03:57 AM    GLUCOSEU Negative 07/22/2023 03:57 AM    KETUA Negative 07/22/2023 03:57 AM    BILIRUBINUR Negative 07/22/2023 03:57 AM    BILIRUBINUR Negative 03/11/2022 05:35 PM    BLOODU TRACE 07/22/2023 03:57 AM    UROBILINOGEN 0.2 07/22/2023 03:57 AM    NITRU Positive 07/22/2023 03:57 AM    LEUKOCYTESUR SMALL 07/22/2023 03:57 AM    WBCUA 10-20 07/22/2023 03:57 AM    RBCUA 0-5 07/22/2023 03:57 AM    EPITHUA 0-5 07/22/2023 03:57 AM    BACTERIA TOO NUMEROUS TO COUNT 07/22/2023 03:57 AM    LABCAST 2-5 07/22/2023 03:57 AM    MUCUS 0 10/21/2022 10:57 AM        Microbiology:  Results       Procedure Component Value Units Date/Time    Culture, Blood 1 [[id number]] Collected: 07/22/23 1123    Order Status: Completed Specimen: Blood Updated: 07/25/23 1036     Special Requests --

## 2023-07-26 ENCOUNTER — CARE COORDINATION (OUTPATIENT)
Dept: CARE COORDINATION | Facility: CLINIC | Age: 78
End: 2023-07-26

## 2023-07-26 NOTE — CARE COORDINATION
Care Transitions Outreach Attempt    Call within 2 business days of discharge: Yes   Attempted to reach patient for transitions of care follow up. Unable to reach patient. Patient: Sloan Morales Patient : 1945 MRN: [id number]    Last Discharge [address]       Date Complaint Diagnosis Description Type Department Provider    23  Ischemic cardiomyopathy . .. Admission (Discharged) S[facility]6MS Brennan Solis MD              Was this an external facility discharge?  No Discharge Facility: sfd    Noted following upcoming appointments from discharge chart review:   [id number] [address] follow up appointment(s):   Future Appointments   Date Time Provider [address]   8/10/2023 11:00 AM HAWK De León - CNP MAT [facility] AMB     Non-BS follow up appointment(s): na

## 2023-07-27 ENCOUNTER — CARE COORDINATION (OUTPATIENT)
Dept: CARE COORDINATION | Facility: CLINIC | Age: 78
End: 2023-07-27

## 2023-07-27 LAB
BACTERIA SPEC CULT: NORMAL
BACTERIA SPEC CULT: NORMAL
SERVICE CMNT-IMP: NORMAL
SERVICE CMNT-IMP: NORMAL

## 2023-07-27 NOTE — CARE COORDINATION
Care Transitions Outreach Attempt    Call within 2 business days of discharge: Yes   Attempted to reach patient for transitions of care follow up. Unable to reach patient. Patient: Catarina Reyez Patient : 1945 MRN: [id number]    Last Discharge [address]       Date Complaint Diagnosis Description Type Department Provider    23  Ischemic cardiomyopathy . .. Admission (Discharged) S[facility]6MS Jaiden Ross MD              Was this an external facility discharge?  No Discharge Facility: sfd    Noted following upcoming appointments from discharge chart review:   [facility] follow up appointment(s):   Future Appointments   Date Time Provider [address]   8/10/2023 11:00 AM HAWK Koroma - CNP [facility] AMB     Non-[facility] follow up appointment(s): na

## 2023-07-28 ENCOUNTER — CARE COORDINATION (OUTPATIENT)
Dept: CARE COORDINATION | Facility: CLINIC | Age: 78
End: 2023-07-28

## 2023-07-28 NOTE — CARE COORDINATION
[facility] Care Transitions Initial Follow Up Call    Call within 2 business days of discharge: Yes    Patient Current Location:  Home: [address] [place] [address]  [address] Transition Nurse contacted the patient by telephone to perform post hospital discharge assessment. Verified name and  with patient as identifiers. Provided introduction to self, and explanation of the Care Transition Nurse role. Patient: Kyle Risk Patient : 1945   MRN: [id number]  Reason for Admission: abdominal pain  Discharge Date: 23 RARS: Readmission Risk Score: 14.4      Last Discharge [address] [facility]       Date Complaint Diagnosis Description Type Department Provider    23  Ischemic cardiomyopathy . .. Admission (Discharged) SFD6MS Larry Bingham MD            Was this an external facility discharge? No Discharge Facility: [facility]    Challenges to be reviewed by the provider   Additional needs identified to be addressed with provider: No  none               Method of communication with provider: none. Ctn spoke with vernon after 3rd attempt at [facility] call. Patient stated he has been doing 'ok' but has noticed some urgency with his urination. He endorsed a 'normal flow once its started' but difficult to start at times. Patient denies swelling and or any further symptoms. Patient has hospital follow up for 8/10 and CTN to inquire of sooner appt for lab and med adjustments based on meds held from IP setting. Care Transition Nurse reviewed discharge instructions, medical action plan, and red flags with patient who verbalized understanding. The patient was given an opportunity to ask questions and does not have any further questions or concerns at this time. Were discharge instructions available to patient? Yes. Reviewed appropriate site of care based on symptoms and resources available to patient including: PCP  Specialist  CTN .  The patient agrees to contact the PCP office for questions

## 2023-08-01 ENCOUNTER — CARE COORDINATION (OUTPATIENT)
Dept: CARE COORDINATION | Facility: CLINIC | Age: 78
End: 2023-08-01

## 2023-08-01 NOTE — CARE COORDINATION
CTN moved patient appt closer to d/c for numerous medications changes and possible labwork. Patient made aware of earlier appt than previously scheduled. CTN encouraged to call with any questions/concerns prior to this date.  8/7 11:00am

## 2023-08-05 NOTE — PROGRESS NOTES
regular rhythm. Heart sounds: No friction rub. No gallop. Comments: Positive JVD on exam  Pulmonary:      Effort: No tachypnea, accessory muscle usage, respiratory distress or retractions. He is not intubated. Breath sounds: Wheezing (Scattered, expiratory) present. No rhonchi or rales. Abdominal:      General: There is distension (Epigastric with some associated tenderness to palpation). Palpations: Abdomen is soft. Tenderness: There is abdominal tenderness in the epigastric area. Musculoskeletal:      Right lower leg: Edema present. Left lower leg: Edema present. Comments: 1+ pitting edema of bilateral lower extremities below the knee   Skin:     General: Skin is dry. Coloration: Skin is not jaundiced. Comments: Venous stasis changes of bilateral lower extremities   Neurological:      Mental Status: He is alert. Psychiatric:         Attention and Perception: Attention normal.         Mood and Affect: Mood and affect normal. Mood is not anxious or depressed. Affect is not tearful. Speech: Speech normal. Speech is not rapid and pressured or slurred. Behavior: Behavior normal. Behavior is not agitated, aggressive or combative. Behavior is cooperative. Thought Content: Thought content normal.          On this date 8/7/2023 I have spent 49 minutes reviewing previous notes, test results and face to face with the patient discussing the diagnosis and importance of compliance with the treatment plan as well as documenting on the day of the visit. An electronic signature was used to authenticate this note.     --Saul Hodgkin, DO

## 2023-08-07 ENCOUNTER — OFFICE VISIT (OUTPATIENT)
Dept: INTERNAL MEDICINE CLINIC | Facility: CLINIC | Age: 78
End: 2023-08-07
Payer: MEDICARE

## 2023-08-07 VITALS
HEART RATE: 87 BPM | RESPIRATION RATE: 20 BRPM | SYSTOLIC BLOOD PRESSURE: 110 MMHG | BODY MASS INDEX: 18.57 KG/M2 | DIASTOLIC BLOOD PRESSURE: 40 MMHG | OXYGEN SATURATION: 93 % | WEIGHT: 125.4 LBS | TEMPERATURE: 97.2 F | HEIGHT: 69 IN

## 2023-08-07 DIAGNOSIS — D64.9 NORMOCYTIC NORMOCHROMIC ANEMIA: ICD-10-CM

## 2023-08-07 DIAGNOSIS — N17.9 ACUTE KIDNEY INJURY (HCC): ICD-10-CM

## 2023-08-07 DIAGNOSIS — I25.10 CORONARY ARTERY DISEASE INVOLVING NATIVE CORONARY ARTERY OF NATIVE HEART WITHOUT ANGINA PECTORIS: ICD-10-CM

## 2023-08-07 DIAGNOSIS — I25.5 ISCHEMIC CARDIOMYOPATHY: ICD-10-CM

## 2023-08-07 DIAGNOSIS — I10 ESSENTIAL HYPERTENSION: ICD-10-CM

## 2023-08-07 DIAGNOSIS — N40.1 BENIGN PROSTATIC HYPERPLASIA WITH URINARY OBSTRUCTION: ICD-10-CM

## 2023-08-07 DIAGNOSIS — Z09 HOSPITAL DISCHARGE FOLLOW-UP: Primary | ICD-10-CM

## 2023-08-07 DIAGNOSIS — E11.59 TYPE 2 DIABETES MELLITUS WITH OTHER CIRCULATORY COMPLICATION, WITHOUT LONG-TERM CURRENT USE OF INSULIN (HCC): ICD-10-CM

## 2023-08-07 DIAGNOSIS — I50.42 CHRONIC HEART FAILURE WITH REDUCED EJECTION FRACTION AND DIASTOLIC DYSFUNCTION (HCC): ICD-10-CM

## 2023-08-07 DIAGNOSIS — N13.8 BENIGN PROSTATIC HYPERPLASIA WITH URINARY OBSTRUCTION: ICD-10-CM

## 2023-08-07 PROBLEM — R35.1 BENIGN PROSTATIC HYPERPLASIA WITH NOCTURIA: Status: RESOLVED | Noted: 2021-08-01 | Resolved: 2023-08-07

## 2023-08-07 LAB
BASOPHILS # BLD: 0 K/UL (ref 0–0.2)
BASOPHILS NFR BLD: 0 % (ref 0–2)
DIFFERENTIAL METHOD BLD: ABNORMAL
EOSINOPHIL # BLD: 0.1 K/UL (ref 0–0.8)
EOSINOPHIL NFR BLD: 2 % (ref 0.5–7.8)
ERYTHROCYTE [DISTWIDTH] IN BLOOD BY AUTOMATED COUNT: 16 % (ref 11.9–14.6)
HCT VFR BLD AUTO: 23.8 % (ref 41.1–50.3)
HGB BLD-MCNC: 7 G/DL (ref 13.6–17.2)
IMM GRANULOCYTES # BLD AUTO: 0 K/UL (ref 0–0.5)
IMM GRANULOCYTES NFR BLD AUTO: 0 % (ref 0–5)
LYMPHOCYTES # BLD: 0.6 K/UL (ref 0.5–4.6)
LYMPHOCYTES NFR BLD: 12 % (ref 13–44)
MCH RBC QN AUTO: 31.1 PG (ref 26.1–32.9)
MCHC RBC AUTO-ENTMCNC: 29.4 G/DL (ref 31.4–35)
MCV RBC AUTO: 105.8 FL (ref 82–102)
MONOCYTES # BLD: 0.4 K/UL (ref 0.1–1.3)
MONOCYTES NFR BLD: 8 % (ref 4–12)
NEUTS SEG # BLD: 3.8 K/UL (ref 1.7–8.2)
NEUTS SEG NFR BLD: 78 % (ref 43–78)
NRBC # BLD: 0 K/UL (ref 0–0.2)
PLATELET # BLD AUTO: 142 K/UL (ref 150–450)
PMV BLD AUTO: 10.1 FL (ref 9.4–12.3)
RBC # BLD AUTO: 2.25 M/UL (ref 4.23–5.6)
WBC # BLD AUTO: 4.9 K/UL (ref 4.3–11.1)

## 2023-08-07 PROCEDURE — 3044F HG A1C LEVEL LT 7.0%: CPT | Performed by: STUDENT IN AN ORGANIZED HEALTH CARE EDUCATION/TRAINING PROGRAM

## 2023-08-07 PROCEDURE — 99215 OFFICE O/P EST HI 40 MIN: CPT | Performed by: STUDENT IN AN ORGANIZED HEALTH CARE EDUCATION/TRAINING PROGRAM

## 2023-08-07 PROCEDURE — 3078F DIAST BP <80 MM HG: CPT | Performed by: STUDENT IN AN ORGANIZED HEALTH CARE EDUCATION/TRAINING PROGRAM

## 2023-08-07 PROCEDURE — 3074F SYST BP LT 130 MM HG: CPT | Performed by: STUDENT IN AN ORGANIZED HEALTH CARE EDUCATION/TRAINING PROGRAM

## 2023-08-07 PROCEDURE — 93000 ELECTROCARDIOGRAM COMPLETE: CPT | Performed by: STUDENT IN AN ORGANIZED HEALTH CARE EDUCATION/TRAINING PROGRAM

## 2023-08-07 PROCEDURE — 1123F ACP DISCUSS/DSCN MKR DOCD: CPT | Performed by: STUDENT IN AN ORGANIZED HEALTH CARE EDUCATION/TRAINING PROGRAM

## 2023-08-07 RX ORDER — SULFAMETHOXAZOLE AND TRIMETHOPRIM 800; 160 MG/1; MG/1
1 TABLET ORAL 2 TIMES DAILY
COMMUNITY

## 2023-08-07 RX ORDER — TORSEMIDE 20 MG/1
20 TABLET ORAL DAILY
COMMUNITY

## 2023-08-07 RX ORDER — GLIMEPIRIDE 1 MG/1
1 TABLET ORAL
Qty: 90 TABLET | Refills: 1 | Status: SHIPPED | OUTPATIENT
Start: 2023-08-07

## 2023-08-07 RX ORDER — LISINOPRIL 40 MG/1
40 TABLET ORAL DAILY
COMMUNITY

## 2023-08-07 RX ORDER — FERROUS SULFATE 325(65) MG
325 TABLET ORAL
Qty: 90 TABLET | Refills: 1 | Status: SHIPPED | OUTPATIENT
Start: 2023-08-07

## 2023-08-07 ASSESSMENT — ENCOUNTER SYMPTOMS
VOMITING: 0
ANAL BLEEDING: 0
BLOOD IN STOOL: 0
CONSTIPATION: 1
SHORTNESS OF BREATH: 1
NAUSEA: 0

## 2023-08-08 ENCOUNTER — TELEPHONE (OUTPATIENT)
Dept: INTERNAL MEDICINE CLINIC | Facility: CLINIC | Age: 78
End: 2023-08-08

## 2023-08-08 DIAGNOSIS — D50.9 IRON DEFICIENCY ANEMIA, UNSPECIFIED IRON DEFICIENCY ANEMIA TYPE: Primary | ICD-10-CM

## 2023-08-08 LAB
ALBUMIN SERPL-MCNC: 3.2 G/DL (ref 3.2–4.6)
ALBUMIN/GLOB SERPL: 1.2 (ref 0.4–1.6)
ALP SERPL-CCNC: 96 U/L (ref 50–136)
ALT SERPL-CCNC: 28 U/L (ref 12–65)
ANION GAP SERPL CALC-SCNC: 5 MMOL/L (ref 2–11)
AST SERPL-CCNC: 29 U/L (ref 15–37)
BILIRUB SERPL-MCNC: 0.4 MG/DL (ref 0.2–1.1)
BUN SERPL-MCNC: 26 MG/DL (ref 8–23)
CALCIUM SERPL-MCNC: 8 MG/DL (ref 8.3–10.4)
CHLORIDE SERPL-SCNC: 110 MMOL/L (ref 101–110)
CO2 SERPL-SCNC: 24 MMOL/L (ref 21–32)
CREAT SERPL-MCNC: 1.3 MG/DL (ref 0.8–1.5)
FERRITIN SERPL-MCNC: 60 NG/ML (ref 8–388)
GLOBULIN SER CALC-MCNC: 2.7 G/DL (ref 2.8–4.5)
GLUCOSE SERPL-MCNC: 135 MG/DL (ref 65–100)
IRON SATN MFR SERPL: 9 %
IRON SERPL-MCNC: 23 UG/DL (ref 35–150)
POTASSIUM SERPL-SCNC: 4.8 MMOL/L (ref 3.5–5.1)
PROT SERPL-MCNC: 5.9 G/DL (ref 6.3–8.2)
SODIUM SERPL-SCNC: 139 MMOL/L (ref 133–143)
TIBC SERPL-MCNC: 262 UG/DL (ref 250–450)

## 2023-08-08 NOTE — TELEPHONE ENCOUNTER
Recent labs drawn show evidence of iron deficiency anemia with hemoglobin dropped to 7. Order placed for repeat CBC as patient on threshold for transfusion. Urgent referral to GI placed to further evaluate.     Orders Placed This Encounter    CBC with Auto Differential     Standing Status:   Future     Standing Expiration Date:   8/8/2024    External Referral To Gastroenterology     Referral Priority:   Urgent     Referral Type:   Eval and Treat     Referral Reason:   Specialty Services Required     Requested Specialty:   Gastroenterology     Number of Visits Requested:   1
Yes, reasonable to hold his aspirin and Plavix until his hemoglobin improves. He is on Plavix due to prior intervention on the lower extremity arteries. Ideally look to get him back on at least aspirin in the future when his hemoglobin is better once ok with GI . Please let us know if we can help further. Thanks a lot.     Tan Maher, DO
Detail Level: Detailed

## 2023-08-09 ENCOUNTER — HOSPITAL ENCOUNTER (OUTPATIENT)
Dept: LAB | Age: 78
Discharge: HOME | End: 2023-08-12

## 2023-08-09 ENCOUNTER — CARE COORDINATION (OUTPATIENT)
Dept: CARE COORDINATION | Facility: CLINIC | Age: 78
End: 2023-08-09

## 2023-08-09 DIAGNOSIS — D50.9 IRON DEFICIENCY ANEMIA, UNSPECIFIED IRON DEFICIENCY ANEMIA TYPE: ICD-10-CM

## 2023-08-09 LAB
BASOPHILS # BLD: 0 K/UL (ref 0–0.2)
BASOPHILS NFR BLD: 0 % (ref 0–2)
DIFFERENTIAL METHOD BLD: ABNORMAL
EOSINOPHIL # BLD: 0.1 K/UL (ref 0–0.8)
EOSINOPHIL NFR BLD: 2 % (ref 0.5–7.8)
ERYTHROCYTE [DISTWIDTH] IN BLOOD BY AUTOMATED COUNT: 15.8 % (ref 11.9–14.6)
HCT VFR BLD AUTO: 22.9 % (ref 41.1–50.3)
HGB BLD-MCNC: 6.8 G/DL (ref 13.6–17.2)
IMM GRANULOCYTES # BLD AUTO: 0.1 K/UL (ref 0–0.5)
IMM GRANULOCYTES NFR BLD AUTO: 1 % (ref 0–5)
LYMPHOCYTES # BLD: 1.1 K/UL (ref 0.5–4.6)
LYMPHOCYTES NFR BLD: 21 % (ref 13–44)
MCH RBC QN AUTO: 30.9 PG (ref 26.1–32.9)
MCHC RBC AUTO-ENTMCNC: 29.7 G/DL (ref 31.4–35)
MCV RBC AUTO: 104.1 FL (ref 82–102)
MONOCYTES # BLD: 0.5 K/UL (ref 0.1–1.3)
MONOCYTES NFR BLD: 9 % (ref 4–12)
NEUTS SEG # BLD: 3.6 K/UL (ref 1.7–8.2)
NEUTS SEG NFR BLD: 67 % (ref 43–78)
NRBC # BLD: 0 K/UL (ref 0–0.2)
PLATELET # BLD AUTO: 165 K/UL (ref 150–450)
PMV BLD AUTO: 10.2 FL (ref 9.4–12.3)
RBC # BLD AUTO: 2.2 M/UL (ref 4.23–5.6)
WBC # BLD AUTO: 5.3 K/UL (ref 4.3–11.1)

## 2023-08-09 NOTE — CARE COORDINATION
[facility] Care Transitions Follow Up Call    Patient Current Location:  Home: [address] [place] [address]  [address] [facility]    Care Transition Nurse contacted the patient by telephone to follow up after admission on . Verified name and  with patient as identifiers. Patient: Maryelizabeth Councilman  Patient : 1945   MRN: [id number]  Reason for Admission: abdominal pain, UTI  Discharge Date: 23 RARS: Readmission Risk Score: 14.4      Needs to be reviewed by the provider   Additional needs identified to be addressed with provider: yes  labs-patient for repeat labs             Method of communication with provider: phone. CTN contacted patient after recent PCP visit. Patient indicated he has repeat lab work and providers have called in regards to medications to be stopped for anemia. Patient aware of lab work today. Indicated he has been contacted by GI after urgent referral and getting appt set up. Patient endorses YEAGER at times over the past few weeks and fatigue. Patient does have some edema to BLE's as well, noted at recent provider visit. Patient working with providers and in contact with office for further instructions. No further assistance needed by CTN at this time. Addressed changes since last contact:  labs-see above  Discussed follow-up appointments. If no appointment was previously scheduled, appointment scheduling offered: Yes. Is follow up appointment scheduled within 7 days of discharge? Yes. Follow Up  Future Appointments   Date Time Provider [address]   2023 11:45 AM S[facility] LAB DS [facility]S S[facility]   2023 11:40 AM Oanh Wallace APRN - CNP [facility] AMB     Non-[facility] follow up appointment(s): [facility]    Care Transition Nurse reviewed discharge instructions, medical action plan, and red flags with patient and discussed any barriers to care and/or understanding of plan of care after discharge.  Discussed appropriate site of care based on symptoms and resources available to

## 2023-08-15 ENCOUNTER — CARE COORDINATION (OUTPATIENT)
Dept: CARE COORDINATION | Facility: CLINIC | Age: 78
End: 2023-08-15

## 2023-08-15 NOTE — CARE COORDINATION
Ctn with unsuccessful attempt at follow up after recent follow up call and ED visit for low HgB. Patient had level <7 and was informed to go to ED. Patient had HgB 7.8 at ED visit. Subsequent PCP appt was cancelled for unknown reason and rescheduled for 8/25. CTN to attempt call again at a later time.

## 2023-08-17 ENCOUNTER — CARE COORDINATION (OUTPATIENT)
Dept: CARE COORDINATION | Facility: CLINIC | Age: 78
End: 2023-08-17

## 2023-08-17 NOTE — CARE COORDINATION
[facility] Up Call    Patient Current Location:  Home: [address] [place] [address] 21  [address] [facility]    Care Transition Nurse contacted the patient by telephone to follow up after admission on . Verified name and  with patient as identifiers. Patient: Alexis January  Patient : 1945   MRN: [id number]  Reason for Admission: abdominal pain  Discharge Date: 23 RARS: Readmission Risk Score: 14.4      Needs to be reviewed by the provider   Additional needs identified to be addressed with provider: No  none             Method of communication with provider: none. Patient indicated he is feeling much better since ED visit and that he had procedure with 'light down my stomach to stop the bleeding and transfusion' although Ctn unable to view notes for this procedure. Patient indicated he has much more energy and is feeling better and has follow up next week on  with PCP. PCP recently sent to ED for low HgB levels. Patient appreciative of call and plans on following with PCP next week. CTN to review OV and discuss with patient at a later date. Addressed changes since last contact:   see above  Discussed follow-up appointments. If no appointment was previously scheduled, appointment scheduling offered: Yes. Is follow up appointment scheduled within 7 days of discharge? Yes. Follow Up  Future Appointments   Date Time Provider [address]   2023 12:40 PM HAWK Pena CNP AMB   2023 11:40 AM HAWK Dalal CNP GVL AMB     Non-[facility] follow up appointment(s): parish    Care Transition Nurse reviewed discharge instructions, medical action plan, and red flags with patient and discussed any barriers to care and/or understanding of plan of care after discharge. Discussed appropriate site of care based on symptoms and resources available to patient including: PCP  Specialist  CTN .  The patient agrees to contact the PCP office

## 2023-08-23 PROBLEM — N39.0 UTI (URINARY TRACT INFECTION): Status: RESOLVED | Noted: 2023-07-24 | Resolved: 2023-08-23

## 2023-08-25 ENCOUNTER — OFFICE VISIT (OUTPATIENT)
Dept: INTERNAL MEDICINE CLINIC | Facility: CLINIC | Age: 78
End: 2023-08-25

## 2023-08-25 VITALS
BODY MASS INDEX: 17.48 KG/M2 | TEMPERATURE: 97.2 F | SYSTOLIC BLOOD PRESSURE: 116 MMHG | DIASTOLIC BLOOD PRESSURE: 78 MMHG | HEART RATE: 86 BPM | WEIGHT: 118 LBS | HEIGHT: 69 IN | OXYGEN SATURATION: 95 %

## 2023-08-25 DIAGNOSIS — Z09 HOSPITAL DISCHARGE FOLLOW-UP: Primary | ICD-10-CM

## 2023-08-25 DIAGNOSIS — D50.0 IRON DEFICIENCY ANEMIA DUE TO CHRONIC BLOOD LOSS: ICD-10-CM

## 2023-08-25 RX ORDER — OMEPRAZOLE 40 MG/1
CAPSULE, DELAYED RELEASE ORAL
Qty: 90 CAPSULE | Refills: 1 | Status: SHIPPED | OUTPATIENT
Start: 2023-08-25

## 2023-08-25 RX ORDER — OMEPRAZOLE 40 MG/1
CAPSULE, DELAYED RELEASE ORAL
COMMUNITY
Start: 2023-08-15 | End: 2023-08-25 | Stop reason: SDUPTHER

## 2023-08-25 ASSESSMENT — ENCOUNTER SYMPTOMS
APNEA: 0
SHORTNESS OF BREATH: 0
CONSTIPATION: 0
EYE PAIN: 0
COLOR CHANGE: 0
BACK PAIN: 0
ABDOMINAL DISTENTION: 0
COUGH: 0
EYE DISCHARGE: 0
EYE REDNESS: 0
SINUS PAIN: 0
EYE ITCHING: 0
ABDOMINAL PAIN: 0
NAUSEA: 0
DIARRHEA: 0

## 2023-08-25 NOTE — PROGRESS NOTES
@[email or web address]@  [address] [id number]      PROGRESS NOTE    SUBJECTIVE:   Leticia Mike is a 65 y.o. male seen for a follow up visit regarding the following chief complaint:     Chief Complaint   Patient presents with    ED Follow-up     Pt was seen at [facility] on 8/11/23 for anemia. HPI  Patient presents for ER follow up on 8/12/23 due to GI bleed and anemia. ER notes reviewed. He was noted to have positive FOBT along with hgb of 6.5 in ER. 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 The angiectasias were treated with both EGD and colonoscopy, with noted diverticulosis; It was recommended he abstain from ETOH and tobacco use. He has continued iron sulfate oral; GI recommended to continue ASA and plavix at 4 days post ER visit and he is currently on these. CBC 8/15 with Hgb of 10.3; Today he feels much better; denies any stomach pain or dark stools. Remains on iron oral.     Current Outpatient Medications   Medication Sig Dispense Refill    omeprazole (PRILOSEC) 40 MG delayed release capsule Take 1 tab PO in the morning 30 min before breakfast. 90 capsule 1    torsemide (DEMADEX) 20 MG tablet Take 1 tablet by mouth daily      sulfamethoxazole-trimethoprim (BACTRIM DS;SEPTRA DS) 800-160 MG per tablet Take 1 tablet by mouth 2 times daily      lisinopril (PRINIVIL;ZESTRIL) 40 MG tablet Take 1 tablet by mouth daily      glimepiride (AMARYL) 1 MG tablet Take 1 tablet by mouth every morning (before breakfast) TAKE IN PLACE OF GLIPIZIDE.  DO NOT TAKE THIS IF FASTING BLOOD SUGAR < 150. 90 tablet 1    ferrous sulfate (IRON 325) 325 (65 Fe) MG tablet Take 1 tablet by mouth daily (with breakfast) 90 tablet 1    levothyroxine (SYNTHROID) 25 MCG tablet Take 1 tablet by mouth Daily 90 tablet 1    carvedilol (COREG) 25 MG tablet Take 1 tablet by mouth 2 times

## 2023-08-29 ENCOUNTER — ENROLLMENT (OUTPATIENT)
Dept: CARE COORDINATION | Facility: CLINIC | Age: 78
End: 2023-08-29

## 2023-08-29 ENCOUNTER — CARE COORDINATION (OUTPATIENT)
Dept: CARE COORDINATION | Facility: CLINIC | Age: 78
End: 2023-08-29

## 2023-08-29 NOTE — CARE COORDINATION
CTN attempted final ANDREW call after recent follow ups and appts. Patient not available at time and CTN to attempt again at a later time.

## 2023-09-01 ENCOUNTER — CARE COORDINATION (OUTPATIENT)
Dept: CARE COORDINATION | Facility: CLINIC | Age: 78
End: 2023-09-01

## 2023-09-01 NOTE — CARE COORDINATION
[address] [address] [facility] [[email or web address]] 250 Care Transitions Follow Up Call    Patient Current Location:  Home: [address] [place] [address] 21  [address] [facility]    Care Transition Nurse contacted the patient by telephone to follow up after admission on 8/15. Verified name and  with patient as identifiers. Patient: Uriel Sheehan  Patient : 1945   MRN: [id number]  Reason for Admission: 8/15  Discharge Date: 23 RARS: Readmission Risk Score: 14.4      Needs to be reviewed by the provider   Additional needs identified to be addressed with provider: No  none             Method of communication with provider: none. Patient with recent PCP appt and indicated he was doing well since d/c. Patient most recent HgB level was >10 and has not noticed any dark color stools or blood in urine after d/c. Patient endorsed improvement in energy level an fatigue over this time. Patient does not have any further questions/concerns and is set for lab work in a couple weeks with PCP and request call back at that time to discuss any changes that may occur. CTN to follow up after lab work in couple weeks and encouraged patient to call with any questions/concerns prior to this time. Addressed changes since last contact:  none  Discussed follow-up appointments. If no appointment was previously scheduled, appointment scheduling offered: Yes. Is follow up appointment scheduled within 7 days of discharge? Yes. Follow Up  Future Appointments   Date Time Provider [address]   9/15/2023 10:30 AM [facility] LAB DS [facility]     Non-[facility] follow up appointment(s): na    Care Transition Nurse reviewed discharge instructions, medical action plan, and red flags with patient and discussed any barriers to care and/or understanding of plan of care after discharge. Discussed appropriate site of care based on symptoms and resources available to patient including: PCP  Specialist  When to call 911  CTN .  The patient agrees to contact the PCP office for

## 2023-09-11 ENCOUNTER — CARE COORDINATION (OUTPATIENT)
Dept: CARE COORDINATION | Facility: CLINIC | Age: 78
End: 2023-09-11

## 2023-09-18 ENCOUNTER — CARE COORDINATION (OUTPATIENT)
Dept: CARE COORDINATION | Facility: CLINIC | Age: 78
End: 2023-09-18

## 2023-09-18 NOTE — CARE COORDINATION
CTN contacted patient after recently scheduled lab work. Patient was not able to attend lab work on 9/15 as scheduled and was encouraged to call PCP office to reschedule labwork or give further recommendations on next steps. CTN to follow up after lab work completed to discuss. Patient feeling well without any new or worsening s/s and has no acute needs.

## 2023-09-26 DIAGNOSIS — E78.2 MIXED HYPERLIPIDEMIA: ICD-10-CM

## 2023-09-26 DIAGNOSIS — Z95.1 HX OF CABG: ICD-10-CM

## 2023-09-26 NOTE — TELEPHONE ENCOUNTER
Will defer refills to [facility] for tomorrow, as I have never seen this patient;

## 2023-09-26 NOTE — TELEPHONE ENCOUNTER
----- Message from Daniel Babinski sent at 9/26/2023 11:26 AM EDT -----  Subject: Refill Request    QUESTIONS  Name of Medication? Other - pantoprazole (PROTONIX) tablet 40 mg   Patient-reported dosage and instructions? 1 a day   How many days do you have left? 0  Preferred Pharmacy? [address] [facility] #[id number]  Pharmacy phone number (if available)? [phone]  ---------------------------------------------------------------------------  --------------,  Name of Medication? aspirin 81 MG chewable tablet  Patient-reported dosage and instructions? 1 a day   How many days do you have left? 0  Preferred Pharmacy? [address] [facility] #[id number]  Pharmacy phone number (if available)? [phone]  ---------------------------------------------------------------------------  --------------,  Name of Medication? atorvastatin (LIPITOR) 40 MG tablet  Patient-reported dosage and instructions? 1 a day   How many days do you have left? 10  Preferred Pharmacy? [address] [facility] #[id number]  Pharmacy phone number (if available)? [phone]  ---------------------------------------------------------------------------  --------------  Donald MCMILLAN  What is the best way for the office to contact you? OK to leave message on   voicemail  Preferred Call Back Phone Number? [phone]  ---------------------------------------------------------------------------  --------------  SCRIPT ANSWERS  Relationship to Patient?  Self

## 2023-09-27 ENCOUNTER — CARE COORDINATION (OUTPATIENT)
Dept: CARE COORDINATION | Facility: CLINIC | Age: 78
End: 2023-09-27

## 2023-09-27 RX ORDER — ASPIRIN 81 MG/1
81 TABLET, CHEWABLE ORAL DAILY
Qty: 90 TABLET | Refills: 1 | Status: SHIPPED | OUTPATIENT
Start: 2023-09-27

## 2023-09-27 RX ORDER — PANTOPRAZOLE SODIUM 40 MG/1
40 TABLET, DELAYED RELEASE ORAL
Qty: 90 TABLET | Refills: 1 | Status: SHIPPED | OUTPATIENT
Start: 2023-09-27

## 2023-09-27 RX ORDER — ATORVASTATIN CALCIUM 40 MG/1
40 TABLET, FILM COATED ORAL DAILY
Qty: 90 TABLET | Refills: 3 | Status: SHIPPED | OUTPATIENT
Start: 2023-09-27

## 2023-10-06 ENCOUNTER — TELEPHONE (OUTPATIENT)
Dept: INTERNAL MEDICINE CLINIC | Facility: CLINIC | Age: 78
End: 2023-10-06

## 2023-10-06 NOTE — TELEPHONE ENCOUNTER
Pt is requesting an alternative to the Norco to be sent to the pharmacy. Pt states their current pharmacy is out of stock until November. Please send alternative to [facility] [address].

## 2023-10-10 DIAGNOSIS — D50.0 IRON DEFICIENCY ANEMIA DUE TO CHRONIC BLOOD LOSS: ICD-10-CM

## 2023-10-10 DIAGNOSIS — R79.89 ELEVATED SERUM CREATININE: ICD-10-CM

## 2023-10-10 LAB
ALBUMIN SERPL-MCNC: 3.6 G/DL (ref 3.2–4.6)
ALBUMIN/GLOB SERPL: 1.3 (ref 0.4–1.6)
ALP SERPL-CCNC: 100 U/L (ref 50–136)
ALT SERPL-CCNC: 40 U/L (ref 12–65)
ANION GAP SERPL CALC-SCNC: 7 MMOL/L (ref 2–11)
AST SERPL-CCNC: 45 U/L (ref 15–37)
BASOPHILS # BLD: 0 K/UL (ref 0–0.2)
BASOPHILS NFR BLD: 1 % (ref 0–2)
BILIRUB SERPL-MCNC: 0.3 MG/DL (ref 0.2–1.1)
BUN SERPL-MCNC: 57 MG/DL (ref 8–23)
CALCIUM SERPL-MCNC: 8.3 MG/DL (ref 8.3–10.4)
CHLORIDE SERPL-SCNC: 116 MMOL/L (ref 101–110)
CO2 SERPL-SCNC: 20 MMOL/L (ref 21–32)
CREAT SERPL-MCNC: 2.1 MG/DL (ref 0.8–1.5)
DIFFERENTIAL METHOD BLD: ABNORMAL
EOSINOPHIL # BLD: 0.2 K/UL (ref 0–0.8)
EOSINOPHIL NFR BLD: 4 % (ref 0.5–7.8)
ERYTHROCYTE [DISTWIDTH] IN BLOOD BY AUTOMATED COUNT: 17 % (ref 11.9–14.6)
GLOBULIN SER CALC-MCNC: 2.7 G/DL (ref 2.8–4.5)
GLUCOSE SERPL-MCNC: 105 MG/DL (ref 65–100)
HCT VFR BLD AUTO: 34.2 % (ref 41.1–50.3)
HGB BLD-MCNC: 10.6 G/DL (ref 13.6–17.2)
IMM GRANULOCYTES # BLD AUTO: 0 K/UL (ref 0–0.5)
IMM GRANULOCYTES NFR BLD AUTO: 0 % (ref 0–5)
LYMPHOCYTES # BLD: 1 K/UL (ref 0.5–4.6)
LYMPHOCYTES NFR BLD: 23 % (ref 13–44)
MCH RBC QN AUTO: 30 PG (ref 26.1–32.9)
MCHC RBC AUTO-ENTMCNC: 31 G/DL (ref 31.4–35)
MCV RBC AUTO: 96.9 FL (ref 82–102)
MONOCYTES # BLD: 0.4 K/UL (ref 0.1–1.3)
MONOCYTES NFR BLD: 9 % (ref 4–12)
NEUTS SEG # BLD: 2.7 K/UL (ref 1.7–8.2)
NEUTS SEG NFR BLD: 63 % (ref 43–78)
NRBC # BLD: 0 K/UL (ref 0–0.2)
PLATELET # BLD AUTO: 144 K/UL (ref 150–450)
PMV BLD AUTO: 11.5 FL (ref 9.4–12.3)
POTASSIUM SERPL-SCNC: 4.9 MMOL/L (ref 3.5–5.1)
PROT SERPL-MCNC: 6.3 G/DL (ref 6.3–8.2)
RBC # BLD AUTO: 3.53 M/UL (ref 4.23–5.6)
SODIUM SERPL-SCNC: 143 MMOL/L (ref 133–143)
WBC # BLD AUTO: 4.3 K/UL (ref 4.3–11.1)

## 2023-10-11 ENCOUNTER — TELEPHONE (OUTPATIENT)
Dept: INTERNAL MEDICINE CLINIC | Facility: CLINIC | Age: 78
End: 2023-10-11

## 2023-10-11 NOTE — TELEPHONE ENCOUNTER
Patient was called in regards to results. It was advised that a f/u is needed in 2-3 months with Dr. Urvashi Gary. Appointment was scheduled on Jan 08, 2024 @ 11:40 am for patient.

## 2023-10-26 RX ORDER — TORSEMIDE 20 MG/1
20 TABLET ORAL DAILY
Qty: 90 TABLET | Refills: 1 | Status: SHIPPED | OUTPATIENT
Start: 2023-10-26

## 2023-10-26 NOTE — TELEPHONE ENCOUNTER
Requested Prescriptions     Pending Prescriptions Disp Refills    torsemide (DEMADEX) 20 MG tablet 90 tablet 1     Sig: Take 1 tablet by mouth daily

## 2023-12-07 ENCOUNTER — OFFICE VISIT (OUTPATIENT)
Dept: INTERNAL MEDICINE CLINIC | Facility: CLINIC | Age: 78
End: 2023-12-07

## 2023-12-07 VITALS
HEART RATE: 86 BPM | HEIGHT: 69 IN | WEIGHT: 116 LBS | DIASTOLIC BLOOD PRESSURE: 70 MMHG | OXYGEN SATURATION: 100 % | BODY MASS INDEX: 17.18 KG/M2 | SYSTOLIC BLOOD PRESSURE: 120 MMHG | TEMPERATURE: 97 F

## 2023-12-07 DIAGNOSIS — Z16.12 UTI DUE TO EXTENDED-SPECTRUM BETA LACTAMASE (ESBL) PRODUCING ESCHERICHIA COLI: ICD-10-CM

## 2023-12-07 DIAGNOSIS — N39.0 UTI DUE TO EXTENDED-SPECTRUM BETA LACTAMASE (ESBL) PRODUCING ESCHERICHIA COLI: ICD-10-CM

## 2023-12-07 DIAGNOSIS — E11.59 TYPE 2 DIABETES MELLITUS WITH OTHER CIRCULATORY COMPLICATION, WITHOUT LONG-TERM CURRENT USE OF INSULIN (HCC): ICD-10-CM

## 2023-12-07 DIAGNOSIS — N17.9 AKI (ACUTE KIDNEY INJURY) (HCC): ICD-10-CM

## 2023-12-07 DIAGNOSIS — N20.0 RENAL STONE: ICD-10-CM

## 2023-12-07 DIAGNOSIS — D69.6 THROMBOCYTOPENIA (HCC): ICD-10-CM

## 2023-12-07 DIAGNOSIS — R82.90 ABNORMAL FINDING ON URINALYSIS: ICD-10-CM

## 2023-12-07 DIAGNOSIS — D50.9 IRON DEFICIENCY ANEMIA, UNSPECIFIED IRON DEFICIENCY ANEMIA TYPE: ICD-10-CM

## 2023-12-07 DIAGNOSIS — B96.29 UTI DUE TO EXTENDED-SPECTRUM BETA LACTAMASE (ESBL) PRODUCING ESCHERICHIA COLI: ICD-10-CM

## 2023-12-07 DIAGNOSIS — I10 ESSENTIAL HYPERTENSION: ICD-10-CM

## 2023-12-07 DIAGNOSIS — E03.8 SUBCLINICAL HYPOTHYROIDISM: ICD-10-CM

## 2023-12-07 DIAGNOSIS — Z09 HOSPITAL DISCHARGE FOLLOW-UP: Primary | ICD-10-CM

## 2023-12-07 LAB
ANION GAP SERPL CALC-SCNC: 7 MMOL/L (ref 2–11)
BASOPHILS # BLD: 0 K/UL (ref 0–0.2)
BASOPHILS NFR BLD: 1 % (ref 0–2)
BILIRUBIN, URINE, POC: NEGATIVE
BLOOD URINE, POC: NORMAL
BUN SERPL-MCNC: 54 MG/DL (ref 8–23)
CALCIUM SERPL-MCNC: 8.4 MG/DL (ref 8.3–10.4)
CHLORIDE SERPL-SCNC: 108 MMOL/L (ref 103–113)
CO2 SERPL-SCNC: 24 MMOL/L (ref 21–32)
CREAT SERPL-MCNC: 2.4 MG/DL (ref 0.8–1.5)
DIFFERENTIAL METHOD BLD: ABNORMAL
EOSINOPHIL # BLD: 0.2 K/UL (ref 0–0.8)
EOSINOPHIL NFR BLD: 3 % (ref 0.5–7.8)
ERYTHROCYTE [DISTWIDTH] IN BLOOD BY AUTOMATED COUNT: 16.5 % (ref 11.9–14.6)
GLUCOSE SERPL-MCNC: 100 MG/DL (ref 65–100)
GLUCOSE URINE, POC: NEGATIVE
HCT VFR BLD AUTO: 35.7 % (ref 41.1–50.3)
HGB BLD-MCNC: 11.2 G/DL (ref 13.6–17.2)
IMM GRANULOCYTES # BLD AUTO: 0 K/UL (ref 0–0.5)
IMM GRANULOCYTES NFR BLD AUTO: 0 % (ref 0–5)
KETONES, URINE, POC: NEGATIVE
LEUKOCYTE ESTERASE, URINE, POC: NORMAL
LYMPHOCYTES # BLD: 1.3 K/UL (ref 0.5–4.6)
LYMPHOCYTES NFR BLD: 22 % (ref 13–44)
MCH RBC QN AUTO: 30.9 PG (ref 26.1–32.9)
MCHC RBC AUTO-ENTMCNC: 31.4 G/DL (ref 31.4–35)
MCV RBC AUTO: 98.6 FL (ref 82–102)
MONOCYTES # BLD: 0.5 K/UL (ref 0.1–1.3)
MONOCYTES NFR BLD: 8 % (ref 4–12)
NEUTS SEG # BLD: 4 K/UL (ref 1.7–8.2)
NEUTS SEG NFR BLD: 66 % (ref 43–78)
NITRITE, URINE, POC: NEGATIVE
NRBC # BLD: 0 K/UL (ref 0–0.2)
PH, URINE, POC: 5.5 (ref 4.6–8)
PLATELET # BLD AUTO: 137 K/UL (ref 150–450)
PMV BLD AUTO: 10.7 FL (ref 9.4–12.3)
POTASSIUM SERPL-SCNC: 4.1 MMOL/L (ref 3.5–5.1)
PROTEIN,URINE, POC: NEGATIVE
RBC # BLD AUTO: 3.62 M/UL (ref 4.23–5.6)
SODIUM SERPL-SCNC: 139 MMOL/L (ref 136–146)
SPECIFIC GRAVITY, URINE, POC: 1.01 (ref 1–1.03)
URINALYSIS CLARITY, POC: NORMAL
URINALYSIS COLOR, POC: YELLOW
UROBILINOGEN, POC: NORMAL
WBC # BLD AUTO: 6.1 K/UL (ref 4.3–11.1)

## 2023-12-07 RX ORDER — GLIMEPIRIDE 1 MG/1
1 TABLET ORAL
Qty: 90 TABLET | Refills: 1 | Status: CANCELLED | OUTPATIENT
Start: 2023-12-07

## 2023-12-07 RX ORDER — GLUCOSAMINE HCL/CHONDROITIN SU 500-400 MG
CAPSULE ORAL
Qty: 100 STRIP | Refills: 1 | Status: CANCELLED | OUTPATIENT
Start: 2023-12-07

## 2023-12-07 RX ORDER — LEVOTHYROXINE SODIUM 0.03 MG/1
25 TABLET ORAL DAILY
Qty: 90 TABLET | Refills: 1 | Status: CANCELLED | OUTPATIENT
Start: 2023-12-07

## 2023-12-07 RX ORDER — POTASSIUM CHLORIDE 20MEQ/15ML
LIQUID (ML) ORAL
COMMUNITY
Start: 2023-10-28

## 2023-12-07 RX ORDER — GLUCOSAMINE HCL/CHONDROITIN SU 500-400 MG
CAPSULE ORAL
Qty: 100 STRIP | Refills: 3 | Status: SHIPPED | OUTPATIENT
Start: 2023-12-07

## 2023-12-07 RX ORDER — NICOTINE 21 MG/24HR
PATCH, TRANSDERMAL 24 HOURS TRANSDERMAL
COMMUNITY
Start: 2023-10-28

## 2023-12-07 RX ORDER — LEVOTHYROXINE SODIUM 0.03 MG/1
25 TABLET ORAL DAILY
Qty: 90 TABLET | Refills: 1 | Status: SHIPPED | OUTPATIENT
Start: 2023-12-07

## 2023-12-07 RX ORDER — LISINOPRIL 40 MG/1
40 TABLET ORAL DAILY
Qty: 90 TABLET | Refills: 1 | Status: CANCELLED | OUTPATIENT
Start: 2023-12-07

## 2023-12-07 RX ORDER — IPRATROPIUM BROMIDE AND ALBUTEROL SULFATE 2.5; .5 MG/3ML; MG/3ML
3 SOLUTION RESPIRATORY (INHALATION) EVERY 6 HOURS
COMMUNITY
Start: 2023-10-28

## 2023-12-07 RX ORDER — LANCETS 30 GAUGE
EACH MISCELLANEOUS
Qty: 100 EACH | Refills: 0 | Status: SHIPPED | OUTPATIENT
Start: 2023-12-07

## 2023-12-07 ASSESSMENT — ENCOUNTER SYMPTOMS
SORE THROAT: 0
COUGH: 0
NAUSEA: 0
VOMITING: 0
BLOOD IN STOOL: 0
ABDOMINAL PAIN: 0
DIARRHEA: 0
SHORTNESS OF BREATH: 0
CONSTIPATION: 0

## 2023-12-07 NOTE — PROGRESS NOTES
Post-Discharge Transitional Care Management Progress Note      Amilcar Height   YOB: 1945    Date of Office Visit:  12/7/2023  Date of Hospital Admission: 10/23/23  Date of Hospital Discharge: 10/28/23    Care management risk score Rising risk (score 2-5) and Complex Care (Scores >=6): No Risk Score On File     Non face to face  following discharge, date last encounter closed (first attempt may have been earlier): *No documented post hospital discharge outreach found in the last 14 days *No documented post hospital discharge outreach found in the last 14 days    Call initiated 2 business days of discharge: *No response recorded in the last 14 days    ASSESSMENT/PLAN:   Hospital discharge follow-up  -     IA DISCHARGE MEDS RECONCILED W/ CURRENT OUTPATIENT MED LIST    UTI due to extended-spectrum beta lactamase (ESBL) producing Escherichia coli  -     AMB POC URINALYSIS DIP STICK AUTO W/O MICRO  UA in the office shows 1+ leukocytes and trace blood. Will send for culture to ensure resolution of infection. Contact information for urology given for patient to call and schedule appointment. JESSICA (acute kidney injury) ([address])  -     Basic Metabolic Panel; Future  Blood work today. Renal stone  Urology contact information given. Iron deficiency anemia, unspecified iron deficiency anemia type  -     CBC with Auto Differential; Future  Recheck blood counts today. Thrombocytopenia ([address])  As above. Essential hypertension  Well-controlled on current regimen. Continue. Type 2 diabetes mellitus with other circulatory complication, without long-term current use of insulin (HCC)  -     blood glucose monitor strips; Test two times a day & as needed ACCU CHEK MORGAN PLUS, Disp-100 strip, R-3, Normal  -     Lancets MISC; Disp-100 each, R-0, NormalACCU-CHEK FASTCLIX LANCETS    Subclinical hypothyroidism  -     levothyroxine (SYNTHROID) 25 MCG tablet;  Take 1 tablet by mouth Daily, Disp-90

## 2023-12-11 LAB
BACTERIA SPEC CULT: ABNORMAL
SERVICE CMNT-IMP: ABNORMAL

## 2023-12-12 ENCOUNTER — TELEPHONE (OUTPATIENT)
Age: 78
End: 2023-12-12

## 2023-12-12 DIAGNOSIS — Z16.12 UTI DUE TO EXTENDED-SPECTRUM BETA LACTAMASE (ESBL) PRODUCING ESCHERICHIA COLI: Primary | ICD-10-CM

## 2023-12-12 DIAGNOSIS — N18.9 CHRONIC KIDNEY DISEASE, UNSPECIFIED CKD STAGE: ICD-10-CM

## 2023-12-12 DIAGNOSIS — B96.29 UTI DUE TO EXTENDED-SPECTRUM BETA LACTAMASE (ESBL) PRODUCING ESCHERICHIA COLI: Primary | ICD-10-CM

## 2023-12-12 DIAGNOSIS — N39.0 UTI DUE TO EXTENDED-SPECTRUM BETA LACTAMASE (ESBL) PRODUCING ESCHERICHIA COLI: Primary | ICD-10-CM

## 2023-12-12 RX ORDER — TORSEMIDE 20 MG/1
10 TABLET ORAL DAILY
Refills: 0 | COMMUNITY
Start: 2023-12-12

## 2023-12-12 RX ORDER — CIPROFLOXACIN 500 MG/1
500 TABLET, FILM COATED ORAL DAILY
Qty: 7 TABLET | Refills: 0 | Status: SHIPPED | OUTPATIENT
Start: 2023-12-12 | End: 2023-12-19

## 2023-12-12 RX ORDER — LISINOPRIL 40 MG/1
20 TABLET ORAL DAILY
COMMUNITY
Start: 2023-12-12

## 2023-12-12 NOTE — TELEPHONE ENCOUNTER
----- Message from Ramo Bunn DO sent at 12/12/2023 10:28 AM EST -----  Good morning,     That seems very reasonable. I am out on leave for the next few months, but I will copy my MA Nato Rose on this message to try to get him an office appointment as I don't think he has been seen in our office in almost a year. Nato Rose ,  Can you please help get Mr Wale Fong an appointment in the next 1-2 weeks with one of the doctors in our office. Please let me know if I can help further. Thank you. Pamela Mccarthy    ----- Message -----  From: HAWK Marin - JESSICA  Sent: 12/12/2023   8:40 AM EST  To: Ramo Bunn DO    I saw a mutual patient of yours late last week for hospital follow-up. Was admitted for hematuria, UTI, anemia, as well as acute on chronic CKD. Labs at his follow-up appt revealed a creatinine of 2.40. I am planning to get him over to nephrology. Until then, would you be okay with me cutting lisinopril dose in half (down to 20 mg daily) and his torsemide dose to 10 mg daily or 20 mg every other day? Lungs clear, no edema on exam.    Thankful for your input!   Yvonne Rae NP

## 2023-12-12 NOTE — TELEPHONE ENCOUNTER
Lvm for patient to call and scheduled appt. Patient needs to be seen by any available provider in the next 2-3 weeks.

## 2023-12-19 RX ORDER — LISINOPRIL 40 MG/1
40 TABLET ORAL DAILY
Qty: 90 TABLET | OUTPATIENT
Start: 2023-12-19

## 2024-01-15 ENCOUNTER — OFFICE VISIT (OUTPATIENT)
Dept: UROLOGY | Age: 79
End: 2024-01-15
Payer: MEDICARE

## 2024-01-15 DIAGNOSIS — N39.0 RECURRENT UTI: ICD-10-CM

## 2024-01-15 DIAGNOSIS — N13.8 BPH WITH OBSTRUCTION/LOWER URINARY TRACT SYMPTOMS: ICD-10-CM

## 2024-01-15 DIAGNOSIS — N39.0 RECURRENT UTI: Primary | ICD-10-CM

## 2024-01-15 DIAGNOSIS — N40.1 BPH WITH OBSTRUCTION/LOWER URINARY TRACT SYMPTOMS: ICD-10-CM

## 2024-01-15 LAB
BILIRUBIN, URINE, POC: NEGATIVE
BLOOD URINE, POC: NORMAL
GLUCOSE URINE, POC: NEGATIVE
KETONES, URINE, POC: NEGATIVE
LEUKOCYTE ESTERASE, URINE, POC: NORMAL
NITRITE, URINE, POC: NEGATIVE
PH, URINE, POC: 6 (ref 4.6–8)
PROTEIN,URINE, POC: NEGATIVE
PVR, POC: 651 CC
SPECIFIC GRAVITY, URINE, POC: 1.02 (ref 1–1.03)
URINALYSIS CLARITY, POC: NORMAL
URINALYSIS COLOR, POC: NORMAL
UROBILINOGEN, POC: NORMAL

## 2024-01-15 PROCEDURE — 81003 URINALYSIS AUTO W/O SCOPE: CPT | Performed by: NURSE PRACTITIONER

## 2024-01-15 PROCEDURE — 1123F ACP DISCUSS/DSCN MKR DOCD: CPT | Performed by: NURSE PRACTITIONER

## 2024-01-15 PROCEDURE — 99204 OFFICE O/P NEW MOD 45 MIN: CPT | Performed by: NURSE PRACTITIONER

## 2024-01-15 PROCEDURE — 51798 US URINE CAPACITY MEASURE: CPT | Performed by: NURSE PRACTITIONER

## 2024-01-15 RX ORDER — TAMSULOSIN HYDROCHLORIDE 0.4 MG/1
0.4 CAPSULE ORAL
Qty: 90 CAPSULE | Refills: 3 | Status: SHIPPED | OUTPATIENT
Start: 2024-01-15

## 2024-01-15 ASSESSMENT — ENCOUNTER SYMPTOMS
SHORTNESS OF BREATH: 1
EYE DISCHARGE: 0
EYE PAIN: 0
INDIGESTION: 0
BLOOD IN STOOL: 0
COUGH: 0
NAUSEA: 0
VOMITING: 0
CONSTIPATION: 0
DIARRHEA: 0
BACK PAIN: 1
HEARTBURN: 0
SKIN LESIONS: 0
WHEEZING: 0
ABDOMINAL PAIN: 0

## 2024-01-15 NOTE — PROGRESS NOTES
[facility]  [address] [address]  [phone]          Beto Shipley  : 1945    Chief Complaint   Patient presents with    New Patient    Urinary Tract Infection          HPI     Beto Shipley is a 78 y.o. male referred for recurrent UTI and LUTS. He reports this has progressed over the past year. He had 4 positive urine cultures in . Last one was 23. Today he denies dysuria or hematuria. He cont to have nocturia up to 4x/night. Daytime frequency over every 2 hours. Weak stream w urinary hesitancy. ?incomplete bladder emptying.      cc via US in office today    CT a/p 10/23/23 showed thick wall bladder ?cystitis. 3 mm L renal stone wo obstruction. Images not available for review.     CARLA 10/24/23 showed NO obstruction. L renal stone. Debris in bladder. Images not available for review.     No personal or family h/o urological CA. Current smoker.    PSA: 0.9 (20), 1.1 (12/10/20), 1.6 (21)    CKD, sCr 2.40 **baseline       Past Medical History:   Diagnosis Date    BPH (benign prostatic hyperplasia)     CHF (NYHA class III, ACC/AHA stage C) ([facility])     Chronic kidney disease     CKD (chronic kidney disease)     COPD (chronic obstructive pulmonary disease) (Formerly [facility])     Coronary atherosclerosis of native coronary artery     Diabetes mellitus with circulatory complication ([facility]ly [facility])     DM neuropathies ([facility]ly [facility])     Dyslipidemia     GERD (gastroesophageal reflux disease)     Hyperlipidemia     Hypertension     Ischemic cardiomyopathy     Mitral regurgitation     PVD (peripheral vascular disease) ([facility]ly [facility])      Past Surgical History:   Procedure Laterality Date    CAROTID ENDARTERECTOMY      CORONARY ARTERY BYPASS GRAFT       Current Outpatient Medications   Medication Sig Dispense Refill    tamsulosin (FLOMAX) 0.4 MG capsule Take 1 capsule by mouth nightly 90 capsule 3    carvedilol (COREG) 25 MG tablet Take 1 tablet by mouth 2 times daily

## 2024-01-17 ENCOUNTER — TELEPHONE (OUTPATIENT)
Dept: INTERNAL MEDICINE CLINIC | Facility: CLINIC | Age: 79
End: 2024-01-17

## 2024-01-17 LAB
BACTERIA SPEC CULT: ABNORMAL
SERVICE CMNT-IMP: ABNORMAL

## 2024-01-17 RX ORDER — CIPROFLOXACIN 250 MG/1
250 TABLET, FILM COATED ORAL 2 TIMES DAILY
Qty: 14 TABLET | Refills: 0 | Status: SHIPPED | OUTPATIENT
Start: 2024-01-17 | End: 2024-01-24

## 2024-01-26 ENCOUNTER — TELEPHONE (OUTPATIENT)
Age: 79
End: 2024-01-26

## 2024-01-26 DIAGNOSIS — I73.9 PVD (PERIPHERAL VASCULAR DISEASE) (HCC): Primary | ICD-10-CM

## 2024-01-26 NOTE — TELEPHONE ENCOUNTER
Patient called with Dr. Gutierrez's response.. patient voiced understanding and agrees to see vascular surgery. Referral entered//brendab    ----- Message from Mari Ryan MD sent at 1/26/2024  7:38 AM EST -----  Dr Huang's patient has apparently severe and diffuse LE arterial disease and is symptomatic.  He also has stage 4 CKD so rather than automatically evaluating with CT, I would recommend a referral to vascular surgery to evaluate and assume management   of this issue.  Complete smoking cessation is a must.  Advised nightly investigation of bottoms of feet, legs, including between toes, looking for ulcers or poorly healing wounds, meticulous foot care with moisturizers, etc.  He should have follow u  p already scheduled with Dr Huang. thanks

## 2024-02-08 ENCOUNTER — TELEPHONE (OUTPATIENT)
Age: 79
End: 2024-02-08

## 2024-02-08 DIAGNOSIS — I73.9 PVD (PERIPHERAL VASCULAR DISEASE) (HCC): Primary | ICD-10-CM

## 2024-02-08 NOTE — TELEPHONE ENCOUNTER
[facility] called & stated that Dr. Quiñones only treats Varicose veins & that referral would need to be entered for pt to see Vasc Surg DT. New referral placed.

## 2024-02-16 ENCOUNTER — OFFICE VISIT (OUTPATIENT)
Dept: VASCULAR SURGERY | Age: 79
End: 2024-02-16
Payer: MEDICARE

## 2024-02-16 VITALS
BODY MASS INDEX: 17.92 KG/M2 | DIASTOLIC BLOOD PRESSURE: 75 MMHG | HEIGHT: 69 IN | SYSTOLIC BLOOD PRESSURE: 125 MMHG | HEART RATE: 82 BPM | WEIGHT: 121 LBS | OXYGEN SATURATION: 97 %

## 2024-02-16 DIAGNOSIS — I73.9 PVD (PERIPHERAL VASCULAR DISEASE) WITH CLAUDICATION (HCC): Primary | ICD-10-CM

## 2024-02-16 PROCEDURE — 3078F DIAST BP <80 MM HG: CPT | Performed by: SURGERY

## 2024-02-16 PROCEDURE — 1123F ACP DISCUSS/DSCN MKR DOCD: CPT | Performed by: SURGERY

## 2024-02-16 PROCEDURE — 3074F SYST BP LT 130 MM HG: CPT | Performed by: SURGERY

## 2024-02-16 PROCEDURE — 99213 OFFICE O/P EST LOW 20 MIN: CPT | Performed by: SURGERY

## 2024-02-16 NOTE — PROGRESS NOTES
[address] [address]  [phone] FAX: [phone]    Beto Shipley  : 1945    Chief Complaint:     History of Present Illness:   Patient follows up today for history of peripheral vascular disease status post right SFA and left SFA stent at outside facility.  Patient denies any claudication or rest pain symptoms.    CURRENT MEDICATIONS:  Current Outpatient Medications   Medication Sig Dispense Refill    tamsulosin (FLOMAX) 0.4 MG capsule Take 1 capsule by mouth nightly 90 capsule 3    carvedilol (COREG) 25 MG tablet Take 1 tablet by mouth 2 times daily 180 tablet 3    clopidogrel (PLAVIX) 75 MG tablet Take 1 tablet by mouth daily 90 tablet 3    torsemide (DEMADEX) 20 MG tablet Take 0.5 tablets by mouth daily  0    lisinopril (PRINIVIL;ZESTRIL) 40 MG tablet Take 0.5 tablets by mouth daily      ipratropium 0.5 mg-albuterol 2.5 mg (DUONEB) 0.5-2.5 (3) MG/3ML SOLN nebulizer solution Inhale 3 mLs into the lungs every 6 hours      nicotine (NICODERM CQ) 14 MG/24HR       levothyroxine (SYNTHROID) 25 MCG tablet Take 1 tablet by mouth Daily 90 tablet 1    aspirin 81 MG chewable tablet Take 1 tablet by mouth daily 90 tablet 1    atorvastatin (LIPITOR) 40 MG tablet Take 1 tablet by mouth daily 90 tablet 3    pantoprazole (PROTONIX) 40 MG tablet Take 1 tablet by mouth every morning (before breakfast) 90 tablet 1    omeprazole (PRILOSEC) 40 MG delayed release capsule Take 1 tab PO in the morning 30 min before breakfast. 90 capsule 1    glimepiride (AMARYL) 1 MG tablet Take 1 tablet by mouth every morning (before breakfast) TAKE IN PLACE OF GLIPIZIDE. DO NOT TAKE THIS IF FASTING BLOOD SUGAR < 150. 90 tablet 1    ferrous sulfate (IRON 325) 325 (65 Fe) MG tablet Take 1 tablet by mouth daily (with breakfast) 90 tablet 1    Nutritional Supplements (ENSURE PLUS HIGH PROTEIN) LIQD Please dispense single case of ensure to take BID as meal supplement. 237 mL 2    nystatin

## 2024-04-11 ENCOUNTER — APPOINTMENT (OUTPATIENT)
Dept: GENERAL RADIOLOGY | Age: 79
End: 2024-04-11
Payer: MEDICARE

## 2024-04-11 ENCOUNTER — HOSPITAL ENCOUNTER (EMERGENCY)
Age: 79
Discharge: HOME | End: 2024-04-12
Payer: MEDICARE

## 2024-04-11 DIAGNOSIS — N39.0 ACUTE UTI: Primary | ICD-10-CM

## 2024-04-11 LAB
ALBUMIN SERPL-MCNC: 3.4 G/DL (ref 3.2–4.6)
ALBUMIN/GLOB SERPL: 1.1 (ref 0.4–1.6)
ALP SERPL-CCNC: 152 U/L (ref 50–136)
ALT SERPL-CCNC: 35 U/L (ref 12–65)
ANION GAP SERPL CALC-SCNC: 3 MMOL/L (ref 2–11)
AST SERPL-CCNC: 38 U/L (ref 15–37)
BACTERIA URNS QL MICRO: ABNORMAL /HPF
BILIRUB SERPL-MCNC: 0.5 MG/DL (ref 0.2–1.1)
BILIRUB UR QL: NEGATIVE
BUN SERPL-MCNC: 32 MG/DL (ref 8–23)
CALCIUM SERPL-MCNC: 7.9 MG/DL (ref 8.3–10.4)
CASTS URNS QL MICRO: 0 /LPF
CHLORIDE SERPL-SCNC: 112 MMOL/L (ref 103–113)
CHP ED QC CHECK: NORMAL
CO2 SERPL-SCNC: 27 MMOL/L (ref 21–32)
CREAT SERPL-MCNC: 1.52 MG/DL (ref 0.8–1.5)
CRYSTALS URNS QL MICRO: 0 /LPF
EPI CELLS #/AREA URNS HPF: ABNORMAL /HPF
ERYTHROCYTE [DISTWIDTH] IN BLOOD BY AUTOMATED COUNT: 14.5 % (ref 11.9–14.6)
GLOBULIN SER CALC-MCNC: 3.1 G/DL (ref 2.8–4.5)
GLUCOSE BLD STRIP.AUTO-MCNC: 156 MG/DL (ref 65–100)
GLUCOSE BLD-MCNC: 156 MG/DL
GLUCOSE SERPL-MCNC: 152 MG/DL (ref 65–100)
GLUCOSE UR QL STRIP.AUTO: NEGATIVE MG/DL
HCT VFR BLD AUTO: 33.8 % (ref 41.1–50.3)
HGB BLD-MCNC: 10.7 G/DL (ref 13.6–17.2)
KETONES UR-MCNC: NEGATIVE MG/DL
LEUKOCYTE ESTERASE UR QL STRIP: ABNORMAL
MCH RBC QN AUTO: 31.6 PG (ref 26.1–32.9)
MCHC RBC AUTO-ENTMCNC: 31.7 G/DL (ref 31.4–35)
MCV RBC AUTO: 99.7 FL (ref 82–102)
MUCOUS THREADS URNS QL MICRO: 0 /LPF
NITRITE UR QL: NEGATIVE
NRBC # BLD: 0 K/UL (ref 0–0.2)
OTHER OBSERVATIONS: ABNORMAL
PH UR: 5.5 (ref 5–9)
PLATELET # BLD AUTO: 101 K/UL (ref 150–450)
PMV BLD AUTO: 10.2 FL (ref 9.4–12.3)
POTASSIUM SERPL-SCNC: 4.5 MMOL/L (ref 3.5–5.1)
PROT SERPL-MCNC: 6.5 G/DL (ref 6.3–8.2)
PROT UR QL: NEGATIVE MG/DL
RBC # BLD AUTO: 3.39 M/UL (ref 4.23–5.6)
RBC # UR STRIP: ABNORMAL
RBC #/AREA URNS HPF: ABNORMAL /HPF
SERVICE CMNT-IMP: ABNORMAL
SERVICE CMNT-IMP: ABNORMAL
SODIUM SERPL-SCNC: 142 MMOL/L (ref 136–146)
SP GR UR: 1.02 (ref 1–1.02)
UROBILINOGEN UR QL: 0.2 EU/DL (ref 0.2–1)
WBC # BLD AUTO: 3.5 K/UL (ref 4.3–11.1)
WBC URNS QL MICRO: ABNORMAL /HPF

## 2024-04-11 PROCEDURE — 82962 GLUCOSE BLOOD TEST: CPT

## 2024-04-11 PROCEDURE — 96374 THER/PROPH/DIAG INJ IV PUSH: CPT

## 2024-04-11 PROCEDURE — 85027 COMPLETE CBC AUTOMATED: CPT

## 2024-04-11 PROCEDURE — 71046 X-RAY EXAM CHEST 2 VIEWS: CPT

## 2024-04-11 PROCEDURE — 81003 URINALYSIS AUTO W/O SCOPE: CPT

## 2024-04-11 PROCEDURE — 80053 COMPREHEN METABOLIC PANEL: CPT

## 2024-04-11 PROCEDURE — 99284 EMERGENCY DEPT VISIT MOD MDM: CPT

## 2024-04-11 PROCEDURE — 87086 URINE CULTURE/COLONY COUNT: CPT

## 2024-04-11 PROCEDURE — 81015 MICROSCOPIC EXAM OF URINE: CPT

## 2024-04-11 ASSESSMENT — PAIN - FUNCTIONAL ASSESSMENT: PAIN_FUNCTIONAL_ASSESSMENT: NONE - DENIES PAIN

## 2024-04-11 ASSESSMENT — ENCOUNTER SYMPTOMS
NAUSEA: 0
BLOOD IN STOOL: 0
ABDOMINAL PAIN: 0
SHORTNESS OF BREATH: 0

## 2024-04-11 ASSESSMENT — LIFESTYLE VARIABLES
HOW MANY STANDARD DRINKS CONTAINING ALCOHOL DO YOU HAVE ON A TYPICAL DAY: 1 OR 2
HOW OFTEN DO YOU HAVE A DRINK CONTAINING ALCOHOL: MONTHLY OR LESS

## 2024-04-12 VITALS
TEMPERATURE: 97.7 F | SYSTOLIC BLOOD PRESSURE: 144 MMHG | OXYGEN SATURATION: 97 % | BODY MASS INDEX: 17.92 KG/M2 | HEART RATE: 79 BPM | WEIGHT: 121 LBS | DIASTOLIC BLOOD PRESSURE: 69 MMHG | HEIGHT: 69 IN | RESPIRATION RATE: 16 BRPM

## 2024-04-12 PROCEDURE — 2580000003 HC RX 258: Performed by: PHYSICIAN ASSISTANT

## 2024-04-12 PROCEDURE — 6360000002 HC RX W HCPCS: Performed by: PHYSICIAN ASSISTANT

## 2024-04-12 RX ORDER — CIPROFLOXACIN 250 MG/1
250 TABLET, FILM COATED ORAL 2 TIMES DAILY
Qty: 14 TABLET | Refills: 0 | Status: SHIPPED | OUTPATIENT
Start: 2024-04-12 | End: 2024-04-19

## 2024-04-12 RX ADMIN — WATER 1000 MG: 1 INJECTION INTRAMUSCULAR; INTRAVENOUS; SUBCUTANEOUS at 00:03

## 2024-04-12 NOTE — DISCHARGE INSTRUCTIONS
Take the antibiotic as prescribed.  I have placed a referral to urology so they are aware of your diagnosis here today and encourage you to follow-up with them.  Have a family member  the antibiotic for you as soon as possible so he can start taking it.  Return to the emergency department as needed for new or worsening symptoms.

## 2024-04-12 NOTE — ED NOTES
Patient mobility status  with difficulty, uses a cane. Provider aware     I have reviewed discharge instructions with the patient.  The patient verbalized understanding.    Patient left ED via Discharge Method: stretcher to Home with  self .    Opportunity for questions and clarification provided.     Patient given 1 scripts.

## 2024-04-12 NOTE — ED PROVIDER NOTES
Notable for the following components:    Blood, UA POC MODERATE (*)     Leukocyte Est, UA POC SMALL (*)     All other components within normal limits   POCT GLUCOSE - Normal   CULTURE, URINE     Medications   cefTRIAXone (ROCEPHIN) 1,000 mg in sterile water 10 mL IV syringe (1,000 mg IntraVENous Given 4/12/24 0003)     XR CHEST (2 VW)   Final Result   Mild bilateral pulmonary infiltrates. Trace bilateral pleural   effusions versus scarring.           Voice dictation software was used during the making of this note. This software is not perfect and grammatical and other typographical errors may be present. This note has not been completely proofread for errors.     Trenton Callaway PA-C  04/12/24 0019

## 2024-04-12 NOTE — ED TRIAGE NOTES
Pt arrives to the ED via EMS from home. Pt admits that he just \"doesn't feel right.\" Pt states that he feels weaker than normal so he called EMS. EMS reports a bgl of 267 en route and administered 400mL of Normal Saline. Pt denies pain, shortness of breath and chest pain at this time. Pt also denies nausea and vomiting. No active signs of distress and vitals stable at this time.

## 2024-04-13 LAB
BACTERIA SPEC CULT: ABNORMAL
SERVICE CMNT-IMP: ABNORMAL

## 2024-04-16 LAB
BACTERIA SPEC CULT: ABNORMAL
SERVICE CMNT-IMP: ABNORMAL

## 2024-04-24 ENCOUNTER — TELEPHONE (OUTPATIENT)
Dept: INTERNAL MEDICINE CLINIC | Facility: CLINIC | Age: 79
End: 2024-04-24

## 2024-04-24 NOTE — TELEPHONE ENCOUNTER
----- Message from Beulah Zamarripa sent at 4/24/2024 12:20 PM EDT -----  Subject: Message to Provider    QUESTIONS  Information for Provider? Pt is due for his AWV, his insurance called with   him on the line and states that he is covered under Dr. Zimmerman. Is   wanting to scheduled him with Dr. Zimmerman, please call to schedule. He is   also willing to see Chika Wiggins. Please call [phone] with   [facility], M-F 8am - 5pm  ---------------------------------------------------------------------------  --------------  CALL BACK INFO  [phone]; OK to leave message on voicemail  ---------------------------------------------------------------------------  --------------  SCRIPT ANSWERS  Relationship to Patient? Self

## 2024-04-29 ENCOUNTER — OFFICE VISIT (OUTPATIENT)
Dept: UROLOGY | Age: 79
End: 2024-04-29
Payer: MEDICARE

## 2024-04-29 DIAGNOSIS — N39.0 RECURRENT UTI: Primary | ICD-10-CM

## 2024-04-29 DIAGNOSIS — N13.8 BPH WITH OBSTRUCTION/LOWER URINARY TRACT SYMPTOMS: ICD-10-CM

## 2024-04-29 DIAGNOSIS — N40.1 BPH WITH OBSTRUCTION/LOWER URINARY TRACT SYMPTOMS: ICD-10-CM

## 2024-04-29 LAB — PVR, POC: 481 CC

## 2024-04-29 PROCEDURE — 99214 OFFICE O/P EST MOD 30 MIN: CPT | Performed by: NURSE PRACTITIONER

## 2024-04-29 PROCEDURE — 1123F ACP DISCUSS/DSCN MKR DOCD: CPT | Performed by: NURSE PRACTITIONER

## 2024-04-29 PROCEDURE — 51798 US URINE CAPACITY MEASURE: CPT | Performed by: NURSE PRACTITIONER

## 2024-04-29 ASSESSMENT — ENCOUNTER SYMPTOMS
BACK PAIN: 0
NAUSEA: 0

## 2024-04-29 NOTE — PROGRESS NOTES
albuterol sulfate HFA (PROVENTIL;VENTOLIN;PROAIR) 108 (90 Base) MCG/ACT inhaler Inhale 2 puffs into the lungs every 4 hours as needed       No current facility-administered medications for this visit.     Allergies   Allergen Reactions    Iohexol Hives    Other Hives    Iodine Rash     Social History     Socioeconomic History    Marital status: Single     Spouse name: Not on file    Number of children: Not on file    Years of education: Not on file    Highest education level: Not on file   Occupational History    Not on file   Tobacco Use    Smoking status: Every Day     Current packs/day: 0.60     Average packs/day: 0.6 packs/day for 50.0 years (30.0 ttl pk-yrs)     Types: Cigarettes    Smokeless tobacco: Never    Tobacco comments:     Quit smoking: almost a pack a day now, been smoking since 21 years old   Vaping Use    Vaping Use: Never used   Substance and Sexual Activity    Alcohol use: Yes     Alcohol/week: 1.0 standard drink of alcohol    Drug use: No    Sexual activity: Not on file   Other Topics Concern    Not on file   Social History Narrative    Not on file     Social Determinants of Health     Financial Resource Strain: Medium Risk (5/26/2023)    Overall Financial Resource Strain (CARDIA)     Difficulty of Paying Living Expenses: Somewhat hard   Food Insecurity: Not on file (5/26/2023)   Recent Concern: Food Insecurity - Food Insecurity Present (5/26/2023)    Hunger Vital Sign     Worried About Running Out of Food in the Last Year: Sometimes true     Ran Out of Food in the Last Year: Sometimes true   Transportation Needs: Unmet Transportation Needs (5/26/2023)    PRAPARE - Transportation     Lack of Transportation (Medical): Not on file     Lack of Transportation (Non-Medical): Yes   Physical Activity: Insufficiently Active (10/26/2022)    Exercise Vital Sign     Days of Exercise per Week: 3 days     Minutes of Exercise per Session: 10 min   Stress: Not on file   Social Connections: Not on file

## 2024-05-01 DIAGNOSIS — I25.10 CORONARY ARTERY DISEASE INVOLVING NATIVE CORONARY ARTERY OF NATIVE HEART WITHOUT ANGINA PECTORIS: Primary | ICD-10-CM

## 2024-05-01 DIAGNOSIS — E11.59 TYPE 2 DIABETES MELLITUS WITH OTHER CIRCULATORY COMPLICATION, WITHOUT LONG-TERM CURRENT USE OF INSULIN (HCC): ICD-10-CM

## 2024-05-01 DIAGNOSIS — E78.2 MIXED HYPERLIPIDEMIA: ICD-10-CM

## 2024-05-03 ENCOUNTER — NURSE ONLY (OUTPATIENT)
Dept: INTERNAL MEDICINE CLINIC | Facility: CLINIC | Age: 79
End: 2024-05-03

## 2024-05-03 DIAGNOSIS — I25.10 CORONARY ARTERY DISEASE INVOLVING NATIVE CORONARY ARTERY OF NATIVE HEART WITHOUT ANGINA PECTORIS: ICD-10-CM

## 2024-05-03 DIAGNOSIS — E11.59 TYPE 2 DIABETES MELLITUS WITH OTHER CIRCULATORY COMPLICATION, WITHOUT LONG-TERM CURRENT USE OF INSULIN (HCC): ICD-10-CM

## 2024-05-03 DIAGNOSIS — E78.2 MIXED HYPERLIPIDEMIA: ICD-10-CM

## 2024-05-03 LAB
ALBUMIN SERPL-MCNC: 4 G/DL (ref 3.2–4.6)
ALBUMIN/GLOB SERPL: 1.5 (ref 1–1.9)
ALP SERPL-CCNC: 138 U/L (ref 40–129)
ALT SERPL-CCNC: 25 U/L (ref 12–65)
ANION GAP SERPL CALC-SCNC: 10 MMOL/L (ref 9–18)
AST SERPL-CCNC: 32 U/L (ref 15–37)
BASOPHILS # BLD: 0 K/UL (ref 0–0.2)
BASOPHILS NFR BLD: 1 % (ref 0–2)
BILIRUB SERPL-MCNC: 0.5 MG/DL (ref 0–1.2)
BUN SERPL-MCNC: 32 MG/DL (ref 8–23)
CALCIUM SERPL-MCNC: 9 MG/DL (ref 8.8–10.2)
CHLORIDE SERPL-SCNC: 108 MMOL/L (ref 98–107)
CHOLEST SERPL-MCNC: 114 MG/DL (ref 0–200)
CO2 SERPL-SCNC: 26 MMOL/L (ref 20–28)
CREAT SERPL-MCNC: 1.52 MG/DL (ref 0.8–1.3)
DIFFERENTIAL METHOD BLD: ABNORMAL
EOSINOPHIL # BLD: 0.1 K/UL (ref 0–0.8)
EOSINOPHIL NFR BLD: 2 % (ref 0.5–7.8)
ERYTHROCYTE [DISTWIDTH] IN BLOOD BY AUTOMATED COUNT: 14.5 % (ref 11.9–14.6)
EST. AVERAGE GLUCOSE BLD GHB EST-MCNC: 146 MG/DL
GLOBULIN SER CALC-MCNC: 2.6 G/DL (ref 2.3–3.5)
GLUCOSE SERPL-MCNC: 242 MG/DL (ref 70–99)
HBA1C MFR BLD: 6.7 % (ref 0–5.6)
HCT VFR BLD AUTO: 36.5 % (ref 41.1–50.3)
HDLC SERPL-MCNC: 46 MG/DL (ref 40–60)
HDLC SERPL: 2.5 (ref 0–5)
HGB BLD-MCNC: 11.4 G/DL (ref 13.6–17.2)
IMM GRANULOCYTES # BLD AUTO: 0 K/UL (ref 0–0.5)
IMM GRANULOCYTES NFR BLD AUTO: 0 % (ref 0–5)
LDLC SERPL CALC-MCNC: 50 MG/DL (ref 0–100)
LYMPHOCYTES # BLD: 0.8 K/UL (ref 0.5–4.6)
LYMPHOCYTES NFR BLD: 16 % (ref 13–44)
MCH RBC QN AUTO: 31.4 PG (ref 26.1–32.9)
MCHC RBC AUTO-ENTMCNC: 31.2 G/DL (ref 31.4–35)
MCV RBC AUTO: 100.6 FL (ref 82–102)
MONOCYTES # BLD: 0.3 K/UL (ref 0.1–1.3)
MONOCYTES NFR BLD: 7 % (ref 4–12)
NEUTS SEG # BLD: 3.7 K/UL (ref 1.7–8.2)
NEUTS SEG NFR BLD: 74 % (ref 43–78)
NRBC # BLD: 0 K/UL (ref 0–0.2)
PLATELET # BLD AUTO: 125 K/UL (ref 150–450)
PMV BLD AUTO: 10.9 FL (ref 9.4–12.3)
POTASSIUM SERPL-SCNC: 5 MMOL/L (ref 3.5–5.1)
PROT SERPL-MCNC: 6.6 G/DL (ref 6.3–8.2)
RBC # BLD AUTO: 3.63 M/UL (ref 4.23–5.6)
SODIUM SERPL-SCNC: 144 MMOL/L (ref 136–145)
TRIGL SERPL-MCNC: 91 MG/DL (ref 0–150)
TSH, 3RD GENERATION: 2.63 UIU/ML (ref 0.27–4.2)
VLDLC SERPL CALC-MCNC: 18 MG/DL (ref 6–23)
WBC # BLD AUTO: 5 K/UL (ref 4.3–11.1)

## 2024-05-09 ENCOUNTER — TELEPHONE (OUTPATIENT)
Dept: INTERNAL MEDICINE CLINIC | Facility: CLINIC | Age: 79
End: 2024-05-09

## 2024-05-13 ENCOUNTER — HOSPITAL ENCOUNTER (OUTPATIENT)
Age: 79
Setting detail: OBSERVATION
Discharge: HOME | End: 2024-05-15
Attending: EMERGENCY MEDICINE | Admitting: STUDENT IN AN ORGANIZED HEALTH CARE EDUCATION/TRAINING PROGRAM
Payer: MEDICARE

## 2024-05-13 ENCOUNTER — APPOINTMENT (OUTPATIENT)
Dept: CT IMAGING | Age: 79
End: 2024-05-13
Payer: MEDICARE

## 2024-05-13 ENCOUNTER — TELEPHONE (OUTPATIENT)
Dept: INTERNAL MEDICINE CLINIC | Facility: CLINIC | Age: 79
End: 2024-05-13

## 2024-05-13 DIAGNOSIS — E87.5 HYPERKALEMIA: Primary | ICD-10-CM

## 2024-05-13 DIAGNOSIS — N13.30 HYDRONEPHROSIS, BILATERAL: ICD-10-CM

## 2024-05-13 DIAGNOSIS — G89.4 CHRONIC PAIN SYNDROME: ICD-10-CM

## 2024-05-13 DIAGNOSIS — K92.1 BLOOD PRESENT IN STOOL: ICD-10-CM

## 2024-05-13 PROBLEM — K92.2 GASTROINTESTINAL HEMORRHAGE: Status: ACTIVE | Noted: 2024-02-07

## 2024-05-13 PROBLEM — N40.1 BENIGN PROSTATIC HYPERPLASIA WITH NOCTURIA: Status: ACTIVE | Noted: 2021-08-01

## 2024-05-13 PROBLEM — I48.91 ATRIAL FIBRILLATION (HCC): Status: ACTIVE | Noted: 2024-02-07

## 2024-05-13 PROBLEM — E03.9 HYPOTHYROIDISM: Status: ACTIVE | Noted: 2024-05-13

## 2024-05-13 PROBLEM — M54.9 BACKACHE: Status: ACTIVE | Noted: 2024-02-07

## 2024-05-13 PROBLEM — R35.1 BENIGN PROSTATIC HYPERPLASIA WITH NOCTURIA: Status: ACTIVE | Noted: 2021-08-01

## 2024-05-13 LAB
ABO + RH BLD: NORMAL
ALBUMIN SERPL-MCNC: 3.7 G/DL (ref 3.2–4.6)
ALBUMIN/GLOB SERPL: 1.4 (ref 1–1.9)
ALP SERPL-CCNC: 147 U/L (ref 40–129)
ALT SERPL-CCNC: 31 U/L (ref 12–65)
ANION GAP SERPL CALC-SCNC: 11 MMOL/L (ref 9–18)
ANION GAP SERPL CALC-SCNC: 12 MMOL/L (ref 9–18)
AST SERPL-CCNC: 45 U/L (ref 15–37)
BASOPHILS # BLD: 0 K/UL (ref 0–0.2)
BASOPHILS NFR BLD: 1 % (ref 0–2)
BILIRUB SERPL-MCNC: 0.5 MG/DL (ref 0–1.2)
BILIRUB UR QL: NEGATIVE
BLOOD GROUP ANTIBODIES SERPL: NORMAL
BUN SERPL-MCNC: 35 MG/DL (ref 8–23)
BUN SERPL-MCNC: 37 MG/DL (ref 8–23)
CALCIUM SERPL-MCNC: 8.6 MG/DL (ref 8.8–10.2)
CALCIUM SERPL-MCNC: 8.9 MG/DL (ref 8.8–10.2)
CHLORIDE SERPL-SCNC: 105 MMOL/L (ref 98–107)
CHLORIDE SERPL-SCNC: 105 MMOL/L (ref 98–107)
CO2 SERPL-SCNC: 21 MMOL/L (ref 20–28)
CO2 SERPL-SCNC: 24 MMOL/L (ref 20–28)
CREAT SERPL-MCNC: 1.58 MG/DL (ref 0.8–1.3)
CREAT SERPL-MCNC: 1.63 MG/DL (ref 0.8–1.3)
DIFFERENTIAL METHOD BLD: ABNORMAL
EKG ATRIAL RATE: 70 BPM
EKG DIAGNOSIS: NORMAL
EKG P AXIS: 73 DEGREES
EKG P-R INTERVAL: 170 MS
EKG Q-T INTERVAL: 447 MS
EKG QRS DURATION: 112 MS
EKG QTC CALCULATION (BAZETT): 483 MS
EKG R AXIS: -52 DEGREES
EKG T AXIS: 110 DEGREES
EKG VENTRICULAR RATE: 70 BPM
EOSINOPHIL # BLD: 0.1 K/UL (ref 0–0.8)
EOSINOPHIL NFR BLD: 3 % (ref 0.5–7.8)
ERYTHROCYTE [DISTWIDTH] IN BLOOD BY AUTOMATED COUNT: 14.4 % (ref 11.9–14.6)
GLOBULIN SER CALC-MCNC: 2.6 G/DL (ref 2.3–3.5)
GLUCOSE BLD STRIP.AUTO-MCNC: 192 MG/DL (ref 65–100)
GLUCOSE SERPL-MCNC: 148 MG/DL (ref 70–99)
GLUCOSE SERPL-MCNC: 189 MG/DL (ref 70–99)
GLUCOSE UR QL STRIP.AUTO: NEGATIVE MG/DL
HCT VFR BLD AUTO: 34.7 % (ref 41.1–50.3)
HGB BLD-MCNC: 10.8 G/DL (ref 13.6–17.2)
IMM GRANULOCYTES # BLD AUTO: 0 K/UL (ref 0–0.5)
IMM GRANULOCYTES NFR BLD AUTO: 0 % (ref 0–5)
INR PPP: 1.3
KETONES UR-MCNC: NEGATIVE MG/DL
LEUKOCYTE ESTERASE UR QL STRIP: NEGATIVE
LYMPHOCYTES # BLD: 0.7 K/UL (ref 0.5–4.6)
LYMPHOCYTES NFR BLD: 17 % (ref 13–44)
MCH RBC QN AUTO: 31.2 PG (ref 26.1–32.9)
MCHC RBC AUTO-ENTMCNC: 31.1 G/DL (ref 31.4–35)
MCV RBC AUTO: 100.3 FL (ref 82–102)
MONOCYTES # BLD: 0.3 K/UL (ref 0.1–1.3)
MONOCYTES NFR BLD: 7 % (ref 4–12)
NEUTS SEG # BLD: 3.2 K/UL (ref 1.7–8.2)
NEUTS SEG NFR BLD: 73 % (ref 43–78)
NITRITE UR QL: NEGATIVE
NRBC # BLD: 0 K/UL (ref 0–0.2)
PH UR: 5.5 (ref 5–9)
PLATELET # BLD AUTO: 97 K/UL (ref 150–450)
PMV BLD AUTO: 10.4 FL (ref 9.4–12.3)
POTASSIUM SERPL-SCNC: 5.5 MMOL/L (ref 3.5–5.1)
POTASSIUM SERPL-SCNC: 6.4 MMOL/L (ref 3.5–5.1)
PROT SERPL-MCNC: 6.3 G/DL (ref 6.3–8.2)
PROT UR QL: NEGATIVE MG/DL
PROTHROMBIN TIME: 16 SEC (ref 11.3–14.9)
RBC # BLD AUTO: 3.46 M/UL (ref 4.23–5.6)
RBC # UR STRIP: ABNORMAL
SERVICE CMNT-IMP: ABNORMAL
SERVICE CMNT-IMP: ABNORMAL
SODIUM SERPL-SCNC: 137 MMOL/L (ref 136–145)
SODIUM SERPL-SCNC: 141 MMOL/L (ref 136–145)
SP GR UR: 1.01 (ref 1–1.02)
SPECIMEN EXP DATE BLD: NORMAL
UROBILINOGEN UR QL: 0.2 EU/DL (ref 0.2–1)
WBC # BLD AUTO: 4.4 K/UL (ref 4.3–11.1)

## 2024-05-13 PROCEDURE — 2580000003 HC RX 258: Performed by: STUDENT IN AN ORGANIZED HEALTH CARE EDUCATION/TRAINING PROGRAM

## 2024-05-13 PROCEDURE — 82962 GLUCOSE BLOOD TEST: CPT

## 2024-05-13 PROCEDURE — 74176 CT ABD & PELVIS W/O CONTRAST: CPT

## 2024-05-13 PROCEDURE — G0378 HOSPITAL OBSERVATION PER HR: HCPCS

## 2024-05-13 PROCEDURE — 93005 ELECTROCARDIOGRAM TRACING: CPT

## 2024-05-13 PROCEDURE — 94760 N-INVAS EAR/PLS OXIMETRY 1: CPT

## 2024-05-13 PROCEDURE — 6360000002 HC RX W HCPCS: Performed by: STUDENT IN AN ORGANIZED HEALTH CARE EDUCATION/TRAINING PROGRAM

## 2024-05-13 PROCEDURE — A4216 STERILE WATER/SALINE, 10 ML: HCPCS

## 2024-05-13 PROCEDURE — 85025 COMPLETE CBC W/AUTO DIFF WBC: CPT

## 2024-05-13 PROCEDURE — 6370000000 HC RX 637 (ALT 250 FOR IP): Performed by: STUDENT IN AN ORGANIZED HEALTH CARE EDUCATION/TRAINING PROGRAM

## 2024-05-13 PROCEDURE — 99285 EMERGENCY DEPT VISIT HI MDM: CPT

## 2024-05-13 PROCEDURE — 80053 COMPREHEN METABOLIC PANEL: CPT

## 2024-05-13 PROCEDURE — 6360000002 HC RX W HCPCS

## 2024-05-13 PROCEDURE — 36415 COLL VENOUS BLD VENIPUNCTURE: CPT

## 2024-05-13 PROCEDURE — 85610 PROTHROMBIN TIME: CPT

## 2024-05-13 PROCEDURE — C9113 INJ PANTOPRAZOLE SODIUM, VIA: HCPCS

## 2024-05-13 PROCEDURE — 93010 ELECTROCARDIOGRAM REPORT: CPT | Performed by: INTERNAL MEDICINE

## 2024-05-13 PROCEDURE — 86850 RBC ANTIBODY SCREEN: CPT

## 2024-05-13 PROCEDURE — 86900 BLOOD TYPING SEROLOGIC ABO: CPT

## 2024-05-13 PROCEDURE — 6370000000 HC RX 637 (ALT 250 FOR IP)

## 2024-05-13 PROCEDURE — 96372 THER/PROPH/DIAG INJ SC/IM: CPT

## 2024-05-13 PROCEDURE — 81003 URINALYSIS AUTO W/O SCOPE: CPT

## 2024-05-13 PROCEDURE — 2500000003 HC RX 250 WO HCPCS

## 2024-05-13 PROCEDURE — 96374 THER/PROPH/DIAG INJ IV PUSH: CPT

## 2024-05-13 PROCEDURE — 86901 BLOOD TYPING SEROLOGIC RH(D): CPT

## 2024-05-13 PROCEDURE — 2580000003 HC RX 258

## 2024-05-13 PROCEDURE — 96375 TX/PRO/DX INJ NEW DRUG ADDON: CPT

## 2024-05-13 RX ORDER — TORSEMIDE 20 MG/1
10 TABLET ORAL DAILY
Status: DISCONTINUED | OUTPATIENT
Start: 2024-05-14 | End: 2024-05-15 | Stop reason: HOSPADM

## 2024-05-13 RX ORDER — INSULIN LISPRO 100 [IU]/ML
0-16 INJECTION, SOLUTION INTRAVENOUS; SUBCUTANEOUS
Status: DISCONTINUED | OUTPATIENT
Start: 2024-05-13 | End: 2024-05-15 | Stop reason: HOSPADM

## 2024-05-13 RX ORDER — MAGNESIUM HYDROXIDE/ALUMINUM HYDROXICE/SIMETHICONE 120; 1200; 1200 MG/30ML; MG/30ML; MG/30ML
30 SUSPENSION ORAL EVERY 6 HOURS PRN
Status: DISCONTINUED | OUTPATIENT
Start: 2024-05-13 | End: 2024-05-15 | Stop reason: HOSPADM

## 2024-05-13 RX ORDER — ONDANSETRON 4 MG/1
4 TABLET, ORALLY DISINTEGRATING ORAL EVERY 8 HOURS PRN
Status: DISCONTINUED | OUTPATIENT
Start: 2024-05-13 | End: 2024-05-15 | Stop reason: HOSPADM

## 2024-05-13 RX ORDER — TAMSULOSIN HYDROCHLORIDE 0.4 MG/1
0.4 CAPSULE ORAL DAILY
Status: DISCONTINUED | OUTPATIENT
Start: 2024-05-14 | End: 2024-05-15 | Stop reason: HOSPADM

## 2024-05-13 RX ORDER — CARVEDILOL 25 MG/1
25 TABLET ORAL 2 TIMES DAILY
Status: DISCONTINUED | OUTPATIENT
Start: 2024-05-13 | End: 2024-05-15 | Stop reason: HOSPADM

## 2024-05-13 RX ORDER — ASPIRIN 81 MG/1
81 TABLET, CHEWABLE ORAL DAILY
Status: DISCONTINUED | OUTPATIENT
Start: 2024-05-14 | End: 2024-05-15 | Stop reason: HOSPADM

## 2024-05-13 RX ORDER — 0.9 % SODIUM CHLORIDE 0.9 %
500 INTRAVENOUS SOLUTION INTRAVENOUS ONCE
Status: COMPLETED | OUTPATIENT
Start: 2024-05-13 | End: 2024-05-13

## 2024-05-13 RX ORDER — LISINOPRIL 20 MG/1
20 TABLET ORAL DAILY
Status: DISCONTINUED | OUTPATIENT
Start: 2024-05-14 | End: 2024-05-15 | Stop reason: HOSPADM

## 2024-05-13 RX ORDER — HEPARIN SODIUM 5000 [USP'U]/ML
5000 INJECTION, SOLUTION INTRAVENOUS; SUBCUTANEOUS EVERY 8 HOURS SCHEDULED
Status: DISCONTINUED | OUTPATIENT
Start: 2024-05-13 | End: 2024-05-15 | Stop reason: HOSPADM

## 2024-05-13 RX ORDER — BISACODYL 10 MG
10 SUPPOSITORY, RECTAL RECTAL DAILY PRN
Status: DISCONTINUED | OUTPATIENT
Start: 2024-05-13 | End: 2024-05-15 | Stop reason: HOSPADM

## 2024-05-13 RX ORDER — GLIPIZIDE 5 MG/1
2.5 TABLET ORAL
Status: DISCONTINUED | OUTPATIENT
Start: 2024-05-14 | End: 2024-05-15 | Stop reason: HOSPADM

## 2024-05-13 RX ORDER — INSULIN LISPRO 100 [IU]/ML
0-4 INJECTION, SOLUTION INTRAVENOUS; SUBCUTANEOUS NIGHTLY
Status: DISCONTINUED | OUTPATIENT
Start: 2024-05-13 | End: 2024-05-15 | Stop reason: HOSPADM

## 2024-05-13 RX ORDER — ACETAMINOPHEN 650 MG/1
650 SUPPOSITORY RECTAL EVERY 6 HOURS PRN
Status: DISCONTINUED | OUTPATIENT
Start: 2024-05-13 | End: 2024-05-15 | Stop reason: HOSPADM

## 2024-05-13 RX ORDER — NICOTINE 21 MG/24HR
1 PATCH, TRANSDERMAL 24 HOURS TRANSDERMAL DAILY
Status: DISCONTINUED | OUTPATIENT
Start: 2024-05-14 | End: 2024-05-15 | Stop reason: HOSPADM

## 2024-05-13 RX ORDER — LEVOTHYROXINE SODIUM 0.05 MG/1
25 TABLET ORAL DAILY
Status: DISCONTINUED | OUTPATIENT
Start: 2024-05-14 | End: 2024-05-15 | Stop reason: HOSPADM

## 2024-05-13 RX ORDER — FAMOTIDINE 20 MG/1
10 TABLET, FILM COATED ORAL DAILY PRN
Status: DISCONTINUED | OUTPATIENT
Start: 2024-05-13 | End: 2024-05-15 | Stop reason: HOSPADM

## 2024-05-13 RX ORDER — ATORVASTATIN CALCIUM 40 MG/1
40 TABLET, FILM COATED ORAL DAILY
Status: DISCONTINUED | OUTPATIENT
Start: 2024-05-14 | End: 2024-05-15 | Stop reason: HOSPADM

## 2024-05-13 RX ORDER — POLYETHYLENE GLYCOL 3350 17 G/17G
17 POWDER, FOR SOLUTION ORAL DAILY PRN
Status: DISCONTINUED | OUTPATIENT
Start: 2024-05-13 | End: 2024-05-15 | Stop reason: HOSPADM

## 2024-05-13 RX ORDER — SODIUM CHLORIDE 9 MG/ML
INJECTION, SOLUTION INTRAVENOUS CONTINUOUS
Status: DISCONTINUED | OUTPATIENT
Start: 2024-05-13 | End: 2024-05-15 | Stop reason: HOSPADM

## 2024-05-13 RX ORDER — ACETAMINOPHEN 325 MG/1
650 TABLET ORAL EVERY 6 HOURS PRN
Status: DISCONTINUED | OUTPATIENT
Start: 2024-05-13 | End: 2024-05-15 | Stop reason: HOSPADM

## 2024-05-13 RX ORDER — PANTOPRAZOLE SODIUM 40 MG/1
40 TABLET, DELAYED RELEASE ORAL
Status: DISCONTINUED | OUTPATIENT
Start: 2024-05-14 | End: 2024-05-14

## 2024-05-13 RX ORDER — ONDANSETRON 2 MG/ML
4 INJECTION INTRAMUSCULAR; INTRAVENOUS EVERY 6 HOURS PRN
Status: DISCONTINUED | OUTPATIENT
Start: 2024-05-13 | End: 2024-05-15 | Stop reason: HOSPADM

## 2024-05-13 RX ORDER — TAMSULOSIN HYDROCHLORIDE 0.4 MG/1
0.4 CAPSULE ORAL
Status: DISCONTINUED | OUTPATIENT
Start: 2024-05-13 | End: 2024-05-13

## 2024-05-13 RX ORDER — CLOPIDOGREL BISULFATE 75 MG/1
75 TABLET ORAL DAILY
Status: DISCONTINUED | OUTPATIENT
Start: 2024-05-14 | End: 2024-05-15 | Stop reason: HOSPADM

## 2024-05-13 RX ORDER — HYDROCODONE BITARTRATE AND ACETAMINOPHEN 10; 325 MG/1; MG/1
1 TABLET ORAL EVERY 8 HOURS PRN
Status: DISCONTINUED | OUTPATIENT
Start: 2024-05-13 | End: 2024-05-15 | Stop reason: HOSPADM

## 2024-05-13 RX ORDER — FUROSEMIDE 10 MG/ML
20 INJECTION INTRAMUSCULAR; INTRAVENOUS ONCE
Status: COMPLETED | OUTPATIENT
Start: 2024-05-13 | End: 2024-05-13

## 2024-05-13 RX ADMIN — BARIUM SULFATE 450 ML: 20 SUSPENSION ORAL at 12:58

## 2024-05-13 RX ADMIN — INSULIN HUMAN 2 UNITS: 100 INJECTION, SOLUTION PARENTERAL at 16:42

## 2024-05-13 RX ADMIN — FUROSEMIDE 20 MG: 10 INJECTION, SOLUTION INTRAVENOUS at 13:50

## 2024-05-13 RX ADMIN — CARVEDILOL 25 MG: 25 TABLET, FILM COATED ORAL at 20:43

## 2024-05-13 RX ADMIN — PANTOPRAZOLE SODIUM 40 MG: 40 INJECTION, POWDER, FOR SOLUTION INTRAVENOUS at 13:01

## 2024-05-13 RX ADMIN — SODIUM CHLORIDE: 9 INJECTION, SOLUTION INTRAVENOUS at 20:14

## 2024-05-13 RX ADMIN — HEPARIN SODIUM 5000 UNITS: 5000 INJECTION INTRAVENOUS; SUBCUTANEOUS at 20:44

## 2024-05-13 RX ADMIN — SODIUM ZIRCONIUM CYCLOSILICATE 10 G: 5 POWDER, FOR SUSPENSION ORAL at 16:26

## 2024-05-13 RX ADMIN — SODIUM BICARBONATE 25 MEQ: 84 INJECTION, SOLUTION INTRAVENOUS at 16:45

## 2024-05-13 RX ADMIN — SODIUM CHLORIDE 500 ML: 9 INJECTION, SOLUTION INTRAVENOUS at 15:42

## 2024-05-13 ASSESSMENT — ENCOUNTER SYMPTOMS
NAUSEA: 0
VOMITING: 0
BLOOD IN STOOL: 1
SHORTNESS OF BREATH: 0
ABDOMINAL PAIN: 1
DIARRHEA: 0

## 2024-05-13 ASSESSMENT — PAIN - FUNCTIONAL ASSESSMENT: PAIN_FUNCTIONAL_ASSESSMENT: NONE - DENIES PAIN

## 2024-05-13 ASSESSMENT — LIFESTYLE VARIABLES
HOW OFTEN DO YOU HAVE A DRINK CONTAINING ALCOHOL: MONTHLY OR LESS
HOW MANY STANDARD DRINKS CONTAINING ALCOHOL DO YOU HAVE ON A TYPICAL DAY: 1 OR 2

## 2024-05-13 NOTE — ED PROVIDER NOTES
(L) 31.4 - 35.0 g/dL    RDW 14.4 11.9 - 14.6 %    Platelets 97 (L) 150 - 450 K/uL    MPV 10.4 9.4 - 12.3 FL    nRBC 0.00 0.0 - 0.2 K/uL    Differential Type AUTOMATED      Neutrophils % 73 43 - 78 %    Lymphocytes % 17 13 - 44 %    Monocytes % 7 4.0 - 12.0 %    Eosinophils % 3 0.5 - 7.8 %    Basophils % 1 0.0 - 2.0 %    Immature Granulocytes % 0 0.0 - 5.0 %    Neutrophils Absolute 3.2 1.7 - 8.2 K/UL    Lymphocytes Absolute 0.7 0.5 - 4.6 K/UL    Monocytes Absolute 0.3 0.1 - 1.3 K/UL    Eosinophils Absolute 0.1 0.0 - 0.8 K/UL    Basophils Absolute 0.0 0.0 - 0.2 K/UL    Immature Granulocytes Absolute 0.0 0.0 - 0.5 K/UL   CMP   Result Value Ref Range    Sodium 137 136 - 145 mmol/L    Potassium 6.4 (HH) 3.5 - 5.1 mmol/L    Chloride 105 98 - 107 mmol/L    CO2 21 20 - 28 mmol/L    Anion Gap 11 9 - 18 mmol/L    Glucose 189 (H) 70 - 99 mg/dL    BUN 37 (H) 8 - 23 MG/DL    Creatinine 1.58 (H) 0.80 - 1.30 MG/DL    Est, Glom Filt Rate 44 (L) >60 ml/min/1.73m2    Calcium 8.6 (L) 8.8 - 10.2 MG/DL    Total Bilirubin 0.5 0.0 - 1.2 MG/DL    ALT 31 12 - 65 U/L    AST 45 (H) 15 - 37 U/L    Alk Phosphatase 147 (H) 40 - 129 U/L    Total Protein 6.3 6.3 - 8.2 g/dL    Albumin 3.7 3.2 - 4.6 g/dL    Globulin 2.6 2.3 - 3.5 g/dL    Albumin/Globulin Ratio 1.4 1.0 - 1.9     Protime-INR   Result Value Ref Range    Protime 16.0 (H) 11.3 - 14.9 sec    INR 1.3     POCT Urinalysis no Micro   Result Value Ref Range    Specific Gravity, Urine, POC 1.010 1.001 - 1.023      pH, Urine, POC 5.5 5.0 - 9.0      Protein, Urine, POC Negative NEG mg/dL    Glucose, UA POC Negative NEG mg/dL    Ketones, Urine, POC Negative NEG mg/dL    Bilirubin, Urine, POC Negative NEG      Blood, UA POC Trace Intact (A) NEG      URINE UROBILINOGEN POC 0.2 0.2 - 1.0 EU/dL    Nitrite, Urine, POC Negative NEG      Leukocyte Est, UA POC Negative NEG      Performed by: Fernando Toledo    EKG 12 Lead   Result Value Ref Range    Ventricular Rate 70 BPM    Atrial Rate 70 BPM    P-R

## 2024-05-13 NOTE — TELEPHONE ENCOUNTER
Pt calling the office with complaints on blood in stool for 1 day as well as SOB when bent over. Spoke with Chika Wiggins NP, and the pt was advised to go to the ER to be evaluated. Pt voiced understanding.

## 2024-05-13 NOTE — ED TRIAGE NOTES
Pt presents stating he noticed blood in stool yesterday.  Pt says he noticed a small amount of \"light\" colored blood.  Pt takes plavix and ASA at home.  Pt states PCP recommended he presents for evaluation.

## 2024-05-13 NOTE — ED NOTES
This RN reviewed patient medication with patient at bedside.  Pt had no narcotics in medication bag; patient now stating that Hydrocodone is missing.

## 2024-05-13 NOTE — ED NOTES
TRANSFER - OUT REPORT:    Verbal report given to RONNY Bailey on Beto Shipley  being transferred to [facility] for urgent transfer       Report consisted of patient's Situation, Background, Assessment and   Recommendations(SBAR).     Information from the following report(s) Nurse Handoff Report, Index, ED Encounter Summary, ED SBAR, Adult Overview, Intake/Output, and MAR was reviewed with the receiving nurse.    [place] Fall Assessment:    Presents to emergency department  because of falls (Syncope, seizure, or loss of consciousness): No  Age > 70: Yes  Altered Mental Status, Intoxication with alcohol or substance confusion (Disorientation, impaired judgment, poor safety awaremess, or inability to follow instructions): No  Impaired Mobility: Ambulates or transfers with assistive devices or assistance; Unable to ambulate or transer.: Yes  Nursing Judgement: Yes          Lines:   Peripheral IV 05/13/24 Left Antecubital (Active)        Opportunity for questions and clarification was provided.      Patient transported with:  Transport

## 2024-05-13 NOTE — H&P
Hospitalist History and Physical   Admit Date:  2024 12:03 PM   Name:  Beto Shipley   Age:  78 y.o.  Sex:  male  :  1945   MRN:  [id number]   Room:  [place] [address]    Presenting/Chief Complaint: Rectal Bleeding     Reason(s) for Admission: Hyperkalemia [E87.5]     History of Present Illness:   Beto Shipley is a 78 y.o. male with medical history of hypertension, diabetes, BPH, CKD who presented with blood-streaked stool.  Patient states that he was straining to have a hard bowel movement yesterday and noted some blood.  Today he had some streaks of blood as well.  He presented to the hospital due to his symptoms.  In the ED vitals were stable.  Labs were pertinent for potassium of 6.4, glucose 189, hemoglobin 10.8 [which is baseline for patient].  Per ED provider rectal exam was benign.  Blood stool was likely due to internal hemorrhoids and constipation.  Patient will be admitted for hyperkalemia management.  In the ED he received Lasix, insulin, bicarb, Lokelma.  Of note patient's narcotic pain medication was stolen by the registration person today.  ER director reached out to patient's pain management provider who stated that they would refill the patient's prescription upon discharge.  However patient will need to be discharged with about 10 pain pills incase pain management provider can not see patient for about 3 days.  Patient takes Norco for chronic back pain.    Assessment & Plan:     Hyperkalemia  K: 6.4  Status post hyperkalemia regimen  Repeat BMP  Monitor on telemetry  EKG:WNL    Coronary atherosclerosis of native coronary artery  HFrEF (heart failure with reduced ejection fraction) (HCC)  On aspirin, Plavix, atorvastatin, Coreg, torsemide    BPH (benign prostatic hyperplasia)  Continue Flomax  Hypothyroidism  Continue levothyroxine    Chronic back pain  On Norco as needed    Essential hypertension  BP stable  On Coreg, lisinopril    Tobacco abuse  On nicotine patch    Type 2

## 2024-05-14 LAB
ANION GAP SERPL CALC-SCNC: 9 MMOL/L (ref 9–18)
BASOPHILS # BLD: 0 K/UL (ref 0–0.2)
BASOPHILS NFR BLD: 1 % (ref 0–2)
BUN SERPL-MCNC: 33 MG/DL (ref 8–23)
CALCIUM SERPL-MCNC: 8.4 MG/DL (ref 8.8–10.2)
CHLORIDE SERPL-SCNC: 110 MMOL/L (ref 98–107)
CO2 SERPL-SCNC: 25 MMOL/L (ref 20–28)
CREAT SERPL-MCNC: 1.44 MG/DL (ref 0.8–1.3)
DIFFERENTIAL METHOD BLD: ABNORMAL
EKG ATRIAL RATE: 83 BPM
EKG DIAGNOSIS: NORMAL
EKG P AXIS: 62 DEGREES
EKG P-R INTERVAL: 160 MS
EKG Q-T INTERVAL: 392 MS
EKG QRS DURATION: 100 MS
EKG QTC CALCULATION (BAZETT): 460 MS
EKG R AXIS: -62 DEGREES
EKG T AXIS: 87 DEGREES
EKG VENTRICULAR RATE: 83 BPM
EOSINOPHIL # BLD: 0.2 K/UL (ref 0–0.8)
EOSINOPHIL NFR BLD: 5 % (ref 0.5–7.8)
ERYTHROCYTE [DISTWIDTH] IN BLOOD BY AUTOMATED COUNT: 14.4 % (ref 11.9–14.6)
GLUCOSE BLD STRIP.AUTO-MCNC: 123 MG/DL (ref 65–100)
GLUCOSE BLD STRIP.AUTO-MCNC: 144 MG/DL (ref 65–100)
GLUCOSE BLD STRIP.AUTO-MCNC: 170 MG/DL (ref 65–100)
GLUCOSE BLD STRIP.AUTO-MCNC: 275 MG/DL (ref 65–100)
GLUCOSE BLD STRIP.AUTO-MCNC: 73 MG/DL (ref 65–100)
GLUCOSE SERPL-MCNC: 129 MG/DL (ref 70–99)
HCT VFR BLD AUTO: 35.3 % (ref 41.1–50.3)
HGB BLD-MCNC: 11 G/DL (ref 13.6–17.2)
IMM GRANULOCYTES # BLD AUTO: 0 K/UL (ref 0–0.5)
IMM GRANULOCYTES NFR BLD AUTO: 0 % (ref 0–5)
LYMPHOCYTES # BLD: 0.7 K/UL (ref 0.5–4.6)
LYMPHOCYTES NFR BLD: 25 % (ref 13–44)
MCH RBC QN AUTO: 31.1 PG (ref 26.1–32.9)
MCHC RBC AUTO-ENTMCNC: 31.2 G/DL (ref 31.4–35)
MCV RBC AUTO: 99.7 FL (ref 82–102)
MONOCYTES # BLD: 0.3 K/UL (ref 0.1–1.3)
MONOCYTES NFR BLD: 9 % (ref 4–12)
NEUTS SEG # BLD: 1.8 K/UL (ref 1.7–8.2)
NEUTS SEG NFR BLD: 60 % (ref 43–78)
NRBC # BLD: 0 K/UL (ref 0–0.2)
PLATELET # BLD AUTO: 98 K/UL (ref 150–450)
PMV BLD AUTO: 10.2 FL (ref 9.4–12.3)
POTASSIUM SERPL-SCNC: 4.9 MMOL/L (ref 3.5–5.1)
POTASSIUM SERPL-SCNC: 5.6 MMOL/L (ref 3.5–5.1)
RBC # BLD AUTO: 3.54 M/UL (ref 4.23–5.6)
SERVICE CMNT-IMP: ABNORMAL
SERVICE CMNT-IMP: NORMAL
SODIUM SERPL-SCNC: 144 MMOL/L (ref 136–145)
WBC # BLD AUTO: 3 K/UL (ref 4.3–11.1)

## 2024-05-14 PROCEDURE — 84132 ASSAY OF SERUM POTASSIUM: CPT

## 2024-05-14 PROCEDURE — 6370000000 HC RX 637 (ALT 250 FOR IP): Performed by: STUDENT IN AN ORGANIZED HEALTH CARE EDUCATION/TRAINING PROGRAM

## 2024-05-14 PROCEDURE — 96376 TX/PRO/DX INJ SAME DRUG ADON: CPT

## 2024-05-14 PROCEDURE — 82962 GLUCOSE BLOOD TEST: CPT

## 2024-05-14 PROCEDURE — 97161 PT EVAL LOW COMPLEX 20 MIN: CPT

## 2024-05-14 PROCEDURE — 97535 SELF CARE MNGMENT TRAINING: CPT

## 2024-05-14 PROCEDURE — G0378 HOSPITAL OBSERVATION PER HR: HCPCS

## 2024-05-14 PROCEDURE — 85025 COMPLETE CBC W/AUTO DIFF WBC: CPT

## 2024-05-14 PROCEDURE — 36415 COLL VENOUS BLD VENIPUNCTURE: CPT

## 2024-05-14 PROCEDURE — 2580000003 HC RX 258: Performed by: STUDENT IN AN ORGANIZED HEALTH CARE EDUCATION/TRAINING PROGRAM

## 2024-05-14 PROCEDURE — 6360000002 HC RX W HCPCS: Performed by: STUDENT IN AN ORGANIZED HEALTH CARE EDUCATION/TRAINING PROGRAM

## 2024-05-14 PROCEDURE — 80048 BASIC METABOLIC PNL TOTAL CA: CPT

## 2024-05-14 PROCEDURE — 97530 THERAPEUTIC ACTIVITIES: CPT

## 2024-05-14 PROCEDURE — 96372 THER/PROPH/DIAG INJ SC/IM: CPT

## 2024-05-14 PROCEDURE — 97165 OT EVAL LOW COMPLEX 30 MIN: CPT

## 2024-05-14 RX ORDER — IBUPROFEN 600 MG/1
1 TABLET ORAL PRN
Status: DISCONTINUED | OUTPATIENT
Start: 2024-05-14 | End: 2024-05-15 | Stop reason: HOSPADM

## 2024-05-14 RX ORDER — POLYETHYLENE GLYCOL 3350 17 G/17G
17 POWDER, FOR SOLUTION ORAL 2 TIMES DAILY
Status: DISCONTINUED | OUTPATIENT
Start: 2024-05-14 | End: 2024-05-15 | Stop reason: HOSPADM

## 2024-05-14 RX ORDER — PANTOPRAZOLE SODIUM 40 MG/1
40 TABLET, DELAYED RELEASE ORAL
Status: DISCONTINUED | OUTPATIENT
Start: 2024-05-14 | End: 2024-05-15 | Stop reason: HOSPADM

## 2024-05-14 RX ORDER — SUCRALFATE 1 G/1
1 TABLET ORAL EVERY 8 HOURS SCHEDULED
Status: DISCONTINUED | OUTPATIENT
Start: 2024-05-14 | End: 2024-05-15 | Stop reason: HOSPADM

## 2024-05-14 RX ORDER — DEXTROSE MONOHYDRATE 100 MG/ML
INJECTION, SOLUTION INTRAVENOUS CONTINUOUS PRN
Status: DISCONTINUED | OUTPATIENT
Start: 2024-05-14 | End: 2024-05-15 | Stop reason: HOSPADM

## 2024-05-14 RX ADMIN — POLYETHYLENE GLYCOL 3350 17 G: 17 POWDER, FOR SOLUTION ORAL at 11:53

## 2024-05-14 RX ADMIN — GLIPIZIDE 2.5 MG: 5 TABLET ORAL at 05:55

## 2024-05-14 RX ADMIN — ATORVASTATIN CALCIUM 40 MG: 40 TABLET, FILM COATED ORAL at 09:58

## 2024-05-14 RX ADMIN — SODIUM CHLORIDE: 9 INJECTION, SOLUTION INTRAVENOUS at 05:56

## 2024-05-14 RX ADMIN — PANTOPRAZOLE SODIUM 40 MG: 40 TABLET, DELAYED RELEASE ORAL at 05:55

## 2024-05-14 RX ADMIN — SUCRALFATE 1 G: 1 TABLET ORAL at 15:57

## 2024-05-14 RX ADMIN — INSULIN LISPRO 8 UNITS: 100 INJECTION, SOLUTION INTRAVENOUS; SUBCUTANEOUS at 12:48

## 2024-05-14 RX ADMIN — PANTOPRAZOLE SODIUM 40 MG: 40 TABLET, DELAYED RELEASE ORAL at 17:55

## 2024-05-14 RX ADMIN — CLOPIDOGREL BISULFATE 75 MG: 75 TABLET ORAL at 09:59

## 2024-05-14 RX ADMIN — LEVOTHYROXINE SODIUM 25 MCG: 50 TABLET ORAL at 05:55

## 2024-05-14 RX ADMIN — TORSEMIDE 10 MG: 20 TABLET ORAL at 09:58

## 2024-05-14 RX ADMIN — HEPARIN SODIUM 5000 UNITS: 5000 INJECTION INTRAVENOUS; SUBCUTANEOUS at 15:57

## 2024-05-14 RX ADMIN — DEXTROSE MONOHYDRATE 125 ML: 100 INJECTION, SOLUTION INTRAVENOUS at 11:51

## 2024-05-14 RX ADMIN — ASPIRIN 81 MG 81 MG: 81 TABLET ORAL at 09:58

## 2024-05-14 RX ADMIN — HEPARIN SODIUM 5000 UNITS: 5000 INJECTION INTRAVENOUS; SUBCUTANEOUS at 05:55

## 2024-05-14 RX ADMIN — CARVEDILOL 25 MG: 25 TABLET, FILM COATED ORAL at 20:52

## 2024-05-14 RX ADMIN — INSULIN HUMAN 10 UNITS: 100 INJECTION, SOLUTION PARENTERAL at 11:43

## 2024-05-14 RX ADMIN — SODIUM CHLORIDE: 9 INJECTION, SOLUTION INTRAVENOUS at 17:59

## 2024-05-14 RX ADMIN — DEXTROSE MONOHYDRATE: 100 INJECTION, SOLUTION INTRAVENOUS at 12:52

## 2024-05-14 RX ADMIN — POLYETHYLENE GLYCOL 3350 17 G: 17 POWDER, FOR SOLUTION ORAL at 20:52

## 2024-05-14 RX ADMIN — LISINOPRIL 20 MG: 20 TABLET ORAL at 09:58

## 2024-05-14 RX ADMIN — SODIUM ZIRCONIUM CYCLOSILICATE 10 G: 10 POWDER, FOR SUSPENSION ORAL at 10:02

## 2024-05-14 RX ADMIN — SUCRALFATE 1 G: 1 TABLET ORAL at 20:52

## 2024-05-14 RX ADMIN — HYDROCODONE BITARTRATE AND ACETAMINOPHEN 1 TABLET: 10; 325 TABLET ORAL at 12:47

## 2024-05-14 RX ADMIN — CARVEDILOL 25 MG: 25 TABLET, FILM COATED ORAL at 09:58

## 2024-05-14 RX ADMIN — TAMSULOSIN HYDROCHLORIDE 0.4 MG: 0.4 CAPSULE ORAL at 09:58

## 2024-05-14 ASSESSMENT — PAIN DESCRIPTION - LOCATION
LOCATION: ABDOMEN
LOCATION: ABDOMEN

## 2024-05-14 ASSESSMENT — PAIN DESCRIPTION - ORIENTATION: ORIENTATION: MID

## 2024-05-14 ASSESSMENT — PAIN SCALES - GENERAL
PAINLEVEL_OUTOF10: 7
PAINLEVEL_OUTOF10: 8

## 2024-05-14 ASSESSMENT — PAIN DESCRIPTION - DESCRIPTORS: DESCRIPTORS: ACHING

## 2024-05-14 NOTE — PLAN OF CARE
Problem: Pain  Goal: Verbalizes/displays adequate comfort level or baseline comfort level  Outcome: Not Progressing       Problem: Discharge Planning  Goal: Discharge to home or other facility with appropriate resources  5/14/2024 1202 by Alexis Toledo RN  Outcome: Progressing  5/14/2024 0014 by Rachael Rosas RN  Outcome: Progressing  Flowsheets (Taken 5/13/2024 2122)  Discharge to home or other facility with appropriate resources:   Identify barriers to discharge with patient and caregiver   Arrange for needed discharge resources and transportation as appropriate   Identify discharge learning needs (meds, wound care, etc)   Refer to discharge planning if patient needs post-hospital services based on physician order or complex needs related to functional status, cognitive ability or social support system     Problem: Safety - Adult  Goal: Free from fall injury  5/14/2024 1202 by Alexis Toledo, RN  Outcome: Progressing  5/14/2024 0014 by Rachael Rosas, RN  Outcome: Progressing     Problem: ABCDS Injury Assessment  Goal: Absence of physical injury  5/14/2024 1202 by Aelxis Toledo, RN  Outcome: Progressing  5/14/2024 0014 by Rachael Rosas, RN  Outcome: Progressing

## 2024-05-14 NOTE — CARE COORDINATION
(Primary); [place] ([place]))   Community Resources None   CM/SW Referral Other (see comment)  (pending clinical course)   Social/Functional History   Lives With Alone   Type of Home Apartment   Home Layout One level   Home Access Stairs to enter with rails   Entrance Stairs - Number of Steps 14   Entrance Stairs - Rails Both   Bathroom Equipment None   Home Equipment Cane;Walker - Standard   Receives Help From Family   ADL Assistance Independent   Homemaking Assistance Independent   Homemaking Responsibilities Yes   Ambulation Assistance Independent   Transfer Assistance Independent   Active  Yes   Mode of Transportation Car   Occupation Retired   Discharge Planning   Type of Residence Apartment   Living Arrangements Alone   Current Services Prior To Admission None   Potential Assistance Needed Other (Comment)  (pending PT/OT evals and recommendations)   DME Ordered? Other (comment)  (pending recommendations)   Potential Assistance Purchasing Medications No   Type of Home Care Services None   Patient expects to be discharged to: Apartment   Follow Up Appointment: Best Day/Time  Friday AM   One/Two Story Residence One story   Services At/After Discharge   Transition of Care Consult (CM Consult) Discharge Planning   Condition of Participation: Discharge Planning   The Plan for Transition of Care is related to the following treatment goals: Patient to return home when medically stable.       Lillie Ayala, MSW, LBSW    [facility]

## 2024-05-15 VITALS
HEART RATE: 80 BPM | DIASTOLIC BLOOD PRESSURE: 59 MMHG | BODY MASS INDEX: 17.77 KG/M2 | SYSTOLIC BLOOD PRESSURE: 144 MMHG | OXYGEN SATURATION: 97 % | TEMPERATURE: 98.1 F | WEIGHT: 120 LBS | HEIGHT: 69 IN | RESPIRATION RATE: 16 BRPM

## 2024-05-15 LAB
ANION GAP SERPL CALC-SCNC: 10 MMOL/L (ref 9–18)
BASOPHILS # BLD: 0 K/UL (ref 0–0.2)
BASOPHILS NFR BLD: 1 % (ref 0–2)
BUN SERPL-MCNC: 29 MG/DL (ref 8–23)
CALCIUM SERPL-MCNC: 7.7 MG/DL (ref 8.8–10.2)
CHLORIDE SERPL-SCNC: 109 MMOL/L (ref 98–107)
CO2 SERPL-SCNC: 23 MMOL/L (ref 20–28)
CREAT SERPL-MCNC: 1.45 MG/DL (ref 0.8–1.3)
DIFFERENTIAL METHOD BLD: ABNORMAL
EOSINOPHIL # BLD: 0.1 K/UL (ref 0–0.8)
EOSINOPHIL NFR BLD: 4 % (ref 0.5–7.8)
ERYTHROCYTE [DISTWIDTH] IN BLOOD BY AUTOMATED COUNT: 14.5 % (ref 11.9–14.6)
GLUCOSE BLD STRIP.AUTO-MCNC: 106 MG/DL (ref 65–100)
GLUCOSE BLD STRIP.AUTO-MCNC: 194 MG/DL (ref 65–100)
GLUCOSE SERPL-MCNC: 121 MG/DL (ref 70–99)
HCT VFR BLD AUTO: 34.5 % (ref 41.1–50.3)
HGB BLD-MCNC: 10.6 G/DL (ref 13.6–17.2)
IMM GRANULOCYTES # BLD AUTO: 0 K/UL (ref 0–0.5)
IMM GRANULOCYTES NFR BLD AUTO: 0 % (ref 0–5)
LYMPHOCYTES # BLD: 0.7 K/UL (ref 0.5–4.6)
LYMPHOCYTES NFR BLD: 20 % (ref 13–44)
MCH RBC QN AUTO: 31.2 PG (ref 26.1–32.9)
MCHC RBC AUTO-ENTMCNC: 30.7 G/DL (ref 31.4–35)
MCV RBC AUTO: 101.5 FL (ref 82–102)
MONOCYTES # BLD: 0.2 K/UL (ref 0.1–1.3)
MONOCYTES NFR BLD: 7 % (ref 4–12)
NEUTS SEG # BLD: 2.3 K/UL (ref 1.7–8.2)
NEUTS SEG NFR BLD: 69 % (ref 43–78)
NRBC # BLD: 0 K/UL (ref 0–0.2)
PLATELET # BLD AUTO: 89 K/UL (ref 150–450)
PMV BLD AUTO: 10.3 FL (ref 9.4–12.3)
POTASSIUM SERPL-SCNC: 4.3 MMOL/L (ref 3.5–5.1)
RBC # BLD AUTO: 3.4 M/UL (ref 4.23–5.6)
SERVICE CMNT-IMP: ABNORMAL
SERVICE CMNT-IMP: ABNORMAL
SODIUM SERPL-SCNC: 142 MMOL/L (ref 136–145)
WBC # BLD AUTO: 3.3 K/UL (ref 4.3–11.1)

## 2024-05-15 PROCEDURE — G0378 HOSPITAL OBSERVATION PER HR: HCPCS

## 2024-05-15 PROCEDURE — 82962 GLUCOSE BLOOD TEST: CPT

## 2024-05-15 PROCEDURE — 36415 COLL VENOUS BLD VENIPUNCTURE: CPT

## 2024-05-15 PROCEDURE — 2580000003 HC RX 258: Performed by: STUDENT IN AN ORGANIZED HEALTH CARE EDUCATION/TRAINING PROGRAM

## 2024-05-15 PROCEDURE — 6370000000 HC RX 637 (ALT 250 FOR IP): Performed by: STUDENT IN AN ORGANIZED HEALTH CARE EDUCATION/TRAINING PROGRAM

## 2024-05-15 PROCEDURE — 80048 BASIC METABOLIC PNL TOTAL CA: CPT

## 2024-05-15 PROCEDURE — 97530 THERAPEUTIC ACTIVITIES: CPT

## 2024-05-15 PROCEDURE — 85025 COMPLETE CBC W/AUTO DIFF WBC: CPT

## 2024-05-15 RX ORDER — HYDROCODONE BITARTRATE AND ACETAMINOPHEN 10; 325 MG/1; MG/1
1 TABLET ORAL EVERY 8 HOURS PRN
Qty: 10 TABLET | Refills: 0 | Status: SHIPPED | OUTPATIENT
Start: 2024-05-15 | End: 2024-05-18

## 2024-05-15 RX ORDER — HYDROCODONE BITARTRATE AND ACETAMINOPHEN 10; 325 MG/1; MG/1
1 TABLET ORAL EVERY 8 HOURS PRN
Qty: 10 TABLET | Refills: 0 | Status: SHIPPED | OUTPATIENT
Start: 2024-05-15 | End: 2024-05-15

## 2024-05-15 RX ORDER — POLYETHYLENE GLYCOL 3350 17 G/17G
17 POWDER, FOR SOLUTION ORAL DAILY PRN
Qty: 510 G | Refills: 0 | Status: SHIPPED | OUTPATIENT
Start: 2024-05-15 | End: 2024-06-14

## 2024-05-15 RX ADMIN — GLIPIZIDE 2.5 MG: 5 TABLET ORAL at 06:01

## 2024-05-15 RX ADMIN — PANTOPRAZOLE SODIUM 40 MG: 40 TABLET, DELAYED RELEASE ORAL at 06:01

## 2024-05-15 RX ADMIN — ATORVASTATIN CALCIUM 40 MG: 40 TABLET, FILM COATED ORAL at 08:16

## 2024-05-15 RX ADMIN — TAMSULOSIN HYDROCHLORIDE 0.4 MG: 0.4 CAPSULE ORAL at 08:16

## 2024-05-15 RX ADMIN — CARVEDILOL 25 MG: 25 TABLET, FILM COATED ORAL at 08:17

## 2024-05-15 RX ADMIN — HYDROCODONE BITARTRATE AND ACETAMINOPHEN 1 TABLET: 10; 325 TABLET ORAL at 06:00

## 2024-05-15 RX ADMIN — CLOPIDOGREL BISULFATE 75 MG: 75 TABLET ORAL at 08:16

## 2024-05-15 RX ADMIN — SUCRALFATE 1 G: 1 TABLET ORAL at 06:01

## 2024-05-15 RX ADMIN — LEVOTHYROXINE SODIUM 25 MCG: 50 TABLET ORAL at 06:01

## 2024-05-15 RX ADMIN — SODIUM CHLORIDE: 9 INJECTION, SOLUTION INTRAVENOUS at 04:14

## 2024-05-15 RX ADMIN — ASPIRIN 81 MG 81 MG: 81 TABLET ORAL at 08:16

## 2024-05-15 RX ADMIN — LISINOPRIL 20 MG: 20 TABLET ORAL at 08:16

## 2024-05-15 RX ADMIN — TORSEMIDE 10 MG: 20 TABLET ORAL at 08:16

## 2024-05-15 ASSESSMENT — PAIN DESCRIPTION - PAIN TYPE: TYPE: CHRONIC PAIN

## 2024-05-15 ASSESSMENT — PAIN SCALES - GENERAL
PAINLEVEL_OUTOF10: 8
PAINLEVEL_OUTOF10: 5

## 2024-05-15 ASSESSMENT — PAIN DESCRIPTION - LOCATION
LOCATION: NECK;BACK
LOCATION: BACK

## 2024-05-15 NOTE — PROGRESS NOTES
Hospitalist Progress Note   Admit Date:  2024 12:03 PM   Name:  Beto Shipley   Age:  78 y.o.  Sex:  male  :  1945   MRN:  [id number]   Room:  [facility]/    Presenting/Chief Complaint: Rectal Bleeding     Reason(s) for Admission: Hyperkalemia [E87.5]  Hydronephrosis, bilateral [N13.30]  Blood present in stool [K92.1]     Hospital Course:   Beto Shipley is a 78 y.o. male with medical history of hypertension, diabetes, BPH, CKD who presented with blood-streaked stool.  Patient states that he was straining to have a hard bowel movement and noted some blood.  He presented to the hospital due to his symptoms.  In the ED vitals were stable.  Labs were pertinent for potassium of 6.4, glucose 189, hemoglobin 10.8 [which is baseline for patient].  Per ED provider rectal exam was benign.  Blood stool was likely due to internal hemorrhoids and constipation.  Patient will be admitted for hyperkalemia management.  In the ED he received Lasix, insulin, bicarb, Lokelma.    Of note patient's narcotic pain medication was stolen by the registration hospital staff.  This was also seen on camera.  ER director reached out to patient's pain management provider who stated that they would refill the patient's prescription upon discharge.  However patient will need to be discharged with about 10 pain pills incase pain management provider can not see patient for about 3 days.  Patient takes Norco for chronic back pain.      Subjective & 24hr Events:   Seen and examined at bedside this morning.  No acute events overnight.  Today patient endorses significant epigastric abdominal pain which has been going on for about a month but appears worse today.  I spoke to GI and they recommend just PPI twice daily, constipation regimen with outpatient follow-up upon discharge.  Plan for discharge home in 24 hours if symptoms improve      Assessment & Plan:     Abdominal pain  Likely due to constipation  CT abdomen shows 
ACUTE OCCUPATIONAL THERAPY GOALS:   (Developed with and agreed upon by patient and/or caregiver.)  1. Patient will perform grooming with SPV and adaptive equipment as needed.  2. Patient will perform upper body dressing with SPV and adaptive equipment as needed.  3. Patient will perform lower body dressing with SPV and adaptive equipment as needed.  4. Patient will perform bathing with SPV and adaptive equipment as needed.  5. Patient will perform toileting and toilet transfer with SPV and adaptive equipment as needed.  6. Patient will perform ADL functional mobility and tranfers in room with MOD I and adaptive equipment as needed.  7. Patient/family to demonstrate knowledge of home safety and DME recommendations.    Timeframe: 7 visits     OCCUPATIONAL THERAPY Initial Assessment, Daily Note, and PM       OT Visit Days: 1  Acknowledge Orders  Time  OT Charge Capture  Rehab Caseload Tracker      Beto Shipley is a 78 y.o. male   PRIMARY DIAGNOSIS: Hyperkalemia  Hyperkalemia [E87.5]  Hydronephrosis, bilateral [N13.30]  Blood present in stool [K92.1]       Reason for Referral: Generalized Muscle Weakness (M62.81)  Difficulty in walking, Not elsewhere classified (R26.2)  Observation: Payor: [employer] / Plan: [place] / Product Type: *No Product type* /     ASSESSMENT:     REHAB RECOMMENDATIONS:   Recommendation to date pending progress:  Setting:  No further skilled occupational therapy after discharge from hospital    Equipment:    To Be Determined     ASSESSMENT:  Mr. Shipley is admitted for the above diagnoses and presents with overall deficits in strength, functional mobility and ADL performance. At baseline, he lives alone in a 1 level apartment with 14 SAÚL. He reports independence with ADLs and with mobility using a cane when out of the house. Today, he was able to perform bed mobility, LB dressing, household mobility and UB dressing with assistance as charted below.  Presents with 
ACUTE PHYSICAL THERAPY GOALS:   (Developed with and agreed upon by patient and/or caregiver.)  STG:  (1.)Mr. Shipley will move from supine to sit and sit to supine  with INDEPENDENCE within 4-7 treatment day(s).    (2.)Mr. Shipley will transfer from bed to chair and chair to bed with MODIFIED INDEPENDENCE using the least restrictive device within 4-7 treatment day(s).    (3.)Mr. Shipley will ambulate with MODIFIED INDEPENDENCE for 400 feet with the least restrictive device within 4-7 treatment day(s).   (4.)Mr. Shipley will participate in lower extremity exercises to increase strength to assist with mobility within 4-7 treatment days.   (5.)Mr. Shipley will go up and down 10 steps with rail and cane and SBA within 4-7 treatment days.     ________________________________________________________________________________________________     PHYSICAL THERAPY Initial Assessment and PM  (Link to Caseload Tracking: PT Visit Days : 1  Acknowledge Orders  Time In/Out  PT Charge Capture  Rehab Caseload Tracker    Beto Shipley is a 78 y.o. male   PRIMARY DIAGNOSIS: Hyperkalemia  Hyperkalemia [E87.5]  Hydronephrosis, bilateral [N13.30]  Blood present in stool [K92.1]       Reason for Referral: Generalized Muscle Weakness (M62.81)  Difficulty in walking, Not elsewhere classified (R26.2)  Observation: Payor: [employer] / Plan: [employer] / Product Type: *No Product type* /     ASSESSMENT:     REHAB RECOMMENDATIONS:   Recommendation to date pending progress:  Setting:  No further skilled physical therapy after discharge from hospital but will follow while here and monitor progress    Equipment:    None     ASSESSMENT:  Mr. Shipley is a 78 year old male admitted with above diagnosis. He came to ED with rectal bleeding and was found to be hyperkalemic. Per CM note he lives alone in a first floor apartment with 14 steps to enter with rails on both sides. (He told CM that his live-in significant other 
ACUTE PHYSICAL THERAPY GOALS:   (Developed with and agreed upon by patient and/or caregiver.)  STG:  (1.)Mr. Shipley will move from supine to sit and sit to supine  with INDEPENDENCE within 4-7 treatment day(s).    (2.)Mr. Shipley will transfer from bed to chair and chair to bed with MODIFIED INDEPENDENCE using the least restrictive device within 4-7 treatment day(s).    (3.)Mr. Shipley will ambulate with MODIFIED INDEPENDENCE for 400 feet with the least restrictive device within 4-7 treatment day(s).   (4.)Mr. Shipley will participate in lower extremity exercises to increase strength to assist with mobility within 4-7 treatment days.   (5.)Mr. Shipley will go up and down 10 steps with rail and cane and SBA within 4-7 treatment days.     ________________________________________________________________________________________________     PHYSICAL THERAPY Initial Assessment and PM  (Link to Caseload Tracking: PT Visit Days : 2  Acknowledge Orders  Time In/Out  PT Charge Capture  Rehab Caseload Tracker    Beto Shipley is a 78 y.o. male   PRIMARY DIAGNOSIS: Hyperkalemia  Hyperkalemia [E87.5]  Hydronephrosis, bilateral [N13.30]  Blood present in stool [K92.1]       Reason for Referral: Generalized Muscle Weakness (M62.81)  Difficulty in walking, Not elsewhere classified (R26.2)  Observation: Payor: [employer] / Plan: [employer] / Product Type: *No Product type* /     ASSESSMENT:     REHAB RECOMMENDATIONS:   Recommendation to date pending progress:  Setting:  Might recommend  PT if he's returned to independence prior to DC    Equipment:     Has a cane     ASSESSMENT:  Mr. Shipley is a 78 year old male admitted with above diagnosis. He came to ED with rectal bleeding and was found to be hyperkalemic. Per CM note he lives alone in a first floor apartment with 14 steps to enter with rails on both sides. (He told CM that his live-in significant other passed away on 5/13). He uses a fold up cane 
Occupational/Physical Therapy Note:    X3 attempts to see patient this AM for occupational & physical therapy evaluations. Patient first with case management--0930, then getting EKG--1007, then eating lunch--1145. Will follow and re-attempt this afternoon as schedule permits/patient available.     Thank you,  Annette Wood, OTR/L     Rehab Caseload Tracker      
(36.4 °C) 79 17 (!) 149/68 97 %   05/13/24 1815 -- 80 23 (!) 128/54 95 %   05/13/24 1758 -- 85 13 (!) 135/59 95 %   05/13/24 1745 -- 89 16 (!) 137/54 94 %   05/13/24 1708 -- 93 16 (!) 150/70 96 %   05/13/24 1631 -- 83 12 (!) 134/53 95 %   05/13/24 1627 -- 78 17 117/76 97 %   05/13/24 1400 -- 72 17 128/67 97 %   05/13/24 1351 -- 71 14 126/67 97 %   05/13/24 1350 -- 70 12 126/67 97 %   05/13/24 1158 97.7 °F (36.5 °C) 76 16 118/68 97 %       Oxygen Therapy  SpO2: 95 %  Pulse via Oximetry: 85 beats per minute  Pulse Oximeter Device Mode: Intermittent  O2 Device: None (Room air)    Estimated body mass index is 17.72 kg/m² as calculated from the following:    Height as of this encounter: 1.753 m (5' 9\").    Weight as of this encounter: 54.4 kg (120 lb).    Intake/Output Summary (Last 24 hours) at 5/14/2024 1059  Last data filed at 5/14/2024 0601  Gross per 24 hour   Intake --   Output 650 ml   Net -650 ml         Physical Exam:     Blood pressure 126/61, pulse 81, temperature 97.9 °F (36.6 °C), temperature source Oral, resp. rate 16, height 1.753 m (5' 9\"), weight 54.4 kg (120 lb), SpO2 95 %.  General:    Well nourished. No Acute Distress    Head:  Normocephalic, atraumatic  Eyes:  Sclerae appear normal.  Pupils equally round.  ENT:  Nares appear normal.  Moist oral mucosa  Neck:  No restricted ROM.  Trachea midline   CV:   RRR.  No m/r/g.  No jugular venous distension.  Lungs:   Diminished air movement in bilateral bases. No audible crackles or wheezing.  Abdomen:   Soft, moderate distension notable at RUQ and RLQ, Tender at Suprapubic and                              LLQ. No guarding or rigidity. No CVA tenderness  Extremities: No cyanosis or clubbing.  No edema  Skin:     Diffuse Macular bruising on bilateral anterior shins.  Warm and dry.    Neuro:  CN II-XII grossly intact.  Sensation intact.   Psych:  Normal mood and affect.      Recent Labs:  Recent Results (from the past 48 hour(s))   CBC with Auto Differential

## 2024-05-15 NOTE — CARE COORDINATION
patient's individualized plan of care/goals, treatment preferences, and shares the quality data associated with the providers?  Yes       Lillie Ayala, VINOD, LBSW    [facility]

## 2024-05-15 NOTE — PLAN OF CARE
Problem: Discharge Planning  Goal: Discharge to home or other facility with appropriate resources  Outcome: Progressing   Pt to go home today with HHS.  Script for 10 tabs norco given to pt. He will follow up in 2 days.

## 2024-05-16 ENCOUNTER — CARE COORDINATION (OUTPATIENT)
Dept: CARE COORDINATION | Facility: CLINIC | Age: 79
End: 2024-05-16

## 2024-05-16 ENCOUNTER — TELEPHONE (OUTPATIENT)
Dept: INTERNAL MEDICINE CLINIC | Facility: CLINIC | Age: 79
End: 2024-05-16

## 2024-05-16 DIAGNOSIS — E87.5 HYPERKALEMIA: Primary | ICD-10-CM

## 2024-05-16 PROCEDURE — 1111F DSCHRG MED/CURRENT MED MERGE: CPT | Performed by: NURSE PRACTITIONER

## 2024-05-16 NOTE — TELEPHONE ENCOUNTER
Care Transitions Initial Follow Up Call    Outreach made within 2 business days of discharge: Yes    Patient: Beto Shipley Patient : 1945   MRN: [id number]  Reason for Admission: There are no discharge diagnoses documented for the most recent discharge.  Discharge Date: 5/15/24       Spoke with: Beto Shipley    Discharge department/facility: [facility] Interactive Patient Contact:  Was patient able to fill all prescriptions: Yes  Was patient instructed to bring all medications to the follow-up visit: Yes  Is patient taking all medications as directed in the discharge summary? Yes  Does patient understand their discharge instructions: Yes  Does patient have questions or concerns that need addressed prior to 7-14 day follow up office visit: no    Scheduled appointment with PCP within 7-14 days    Follow Up  Future Appointments   Date Time Provider Department Center   2024  1:00 PM Chika Wiggins APRN - CNP [facility]   2024 11:30 AM Zaheer Thomason MD [facility]   2024 11:00 AM [facility]   2024 11:45 AM Lawrence Toledo MD [facility]       FRANCO CORDERO MA

## 2024-05-16 NOTE — CARE COORDINATION
no    Non-face-to-face services provided:  Confirmed appointment with PCP-5/17/2024 at 1:00 pm  Confirmed appointment with Specialist-Kirsten Augustin [facility]ph[facility] [place]-5/20/2024 at 2:00 pm  [facility]-5/21/2024 at 11:30 am Jennifer office  Obtained and reviewed discharge summary and/or continuity of care documents  Communication with home health agencies or other community services the patient is currently using-[facility] scheduled for initial visit 5/17/2024   Goals Addressed                   This Visit's Progress     Returns to baseline activity level.        Patient/Family able to obtain medicine after d/c     Patient/Family able to verbalize medicine changes     Patient/Family aware and attends follow up appointments s/p d/c.     Patient/Family agrees to notify provider of any barriers to plan of care.    Patient/Family agrees to notify provider of any symptoms that indicate a worsening of condition              Offered patient enrollment in the Remote Patient Monitoring (RPM) program for in-home monitoring: Yes, but did not enroll at this time: limited patient ability to navigate RPM/equipment.    Care Transitions 24 Hour Call    Schedule Follow Up Appointment with PCP: Completed  Do you have a copy of your discharge instructions?: Yes  Do you have all of your prescriptions and are they filled?: Yes  Have you been contacted by a [facility] Pharmacist?: No  Have you scheduled your follow up appointment?: Yes  How are you going to get to your appointment?: Car - drive self  Do you have support at home?: Alone  Do you feel like you have everything you need to keep you well at home?: Yes  Are you an active caregiver in your home?: No  Care Transitions Interventions    Physical Therapy: Completed    Disease Association: Completed               Discussed follow-up appointments. If no appointment was previously scheduled, appointment scheduling offered: Yes.   Is follow up appointment scheduled

## 2024-05-17 ENCOUNTER — OFFICE VISIT (OUTPATIENT)
Dept: INTERNAL MEDICINE CLINIC | Facility: CLINIC | Age: 79
End: 2024-05-17

## 2024-05-17 ENCOUNTER — TELEPHONE (OUTPATIENT)
Dept: INTERNAL MEDICINE CLINIC | Facility: CLINIC | Age: 79
End: 2024-05-17

## 2024-05-17 VITALS
TEMPERATURE: 97.3 F | HEART RATE: 78 BPM | OXYGEN SATURATION: 98 % | HEIGHT: 69 IN | WEIGHT: 122 LBS | DIASTOLIC BLOOD PRESSURE: 56 MMHG | BODY MASS INDEX: 18.07 KG/M2 | SYSTOLIC BLOOD PRESSURE: 120 MMHG

## 2024-05-17 DIAGNOSIS — E11.59 TYPE 2 DIABETES MELLITUS WITH OTHER CIRCULATORY COMPLICATION, WITHOUT LONG-TERM CURRENT USE OF INSULIN (HCC): ICD-10-CM

## 2024-05-17 DIAGNOSIS — E87.5 HYPERKALEMIA: ICD-10-CM

## 2024-05-17 DIAGNOSIS — Z09 HOSPITAL DISCHARGE FOLLOW-UP: Primary | ICD-10-CM

## 2024-05-17 DIAGNOSIS — D64.9 ANEMIA, UNSPECIFIED TYPE: ICD-10-CM

## 2024-05-17 DIAGNOSIS — K59.00 CONSTIPATION, UNSPECIFIED CONSTIPATION TYPE: ICD-10-CM

## 2024-05-17 DIAGNOSIS — E03.8 SUBCLINICAL HYPOTHYROIDISM: ICD-10-CM

## 2024-05-17 DIAGNOSIS — I10 ESSENTIAL HYPERTENSION: ICD-10-CM

## 2024-05-17 PROBLEM — I50.23 ACUTE ON CHRONIC SYSTOLIC (CONGESTIVE) HEART FAILURE (HCC): Status: ACTIVE | Noted: 2024-05-17

## 2024-05-17 PROBLEM — D69.6 THROMBOCYTOPENIA (HCC): Status: ACTIVE | Noted: 2024-05-17

## 2024-05-17 PROBLEM — R79.89 ABNORMAL LIVER FUNCTION TESTS: Status: ACTIVE | Noted: 2023-07-25

## 2024-05-17 LAB
ANION GAP SERPL CALC-SCNC: 10 MMOL/L (ref 9–18)
BASOPHILS # BLD: 0 K/UL (ref 0–0.2)
BASOPHILS NFR BLD: 1 % (ref 0–2)
BUN SERPL-MCNC: 31 MG/DL (ref 8–23)
CALCIUM SERPL-MCNC: 8.3 MG/DL (ref 8.8–10.2)
CHLORIDE SERPL-SCNC: 107 MMOL/L (ref 98–107)
CO2 SERPL-SCNC: 24 MMOL/L (ref 20–28)
CREAT SERPL-MCNC: 1.43 MG/DL (ref 0.8–1.3)
DIFFERENTIAL METHOD BLD: ABNORMAL
EOSINOPHIL # BLD: 0.2 K/UL (ref 0–0.8)
EOSINOPHIL NFR BLD: 4 % (ref 0.5–7.8)
ERYTHROCYTE [DISTWIDTH] IN BLOOD BY AUTOMATED COUNT: 14.6 % (ref 11.9–14.6)
GLUCOSE SERPL-MCNC: 194 MG/DL (ref 70–99)
HCT VFR BLD AUTO: 34.6 % (ref 41.1–50.3)
HGB BLD-MCNC: 10.6 G/DL (ref 13.6–17.2)
IMM GRANULOCYTES # BLD AUTO: 0 K/UL (ref 0–0.5)
IMM GRANULOCYTES NFR BLD AUTO: 0 % (ref 0–5)
LYMPHOCYTES # BLD: 0.7 K/UL (ref 0.5–4.6)
LYMPHOCYTES NFR BLD: 19 % (ref 13–44)
MCH RBC QN AUTO: 31.4 PG (ref 26.1–32.9)
MCHC RBC AUTO-ENTMCNC: 30.6 G/DL (ref 31.4–35)
MCV RBC AUTO: 102.4 FL (ref 82–102)
MONOCYTES # BLD: 0.3 K/UL (ref 0.1–1.3)
MONOCYTES NFR BLD: 9 % (ref 4–12)
NEUTS SEG # BLD: 2.5 K/UL (ref 1.7–8.2)
NEUTS SEG NFR BLD: 67 % (ref 43–78)
NRBC # BLD: 0 K/UL (ref 0–0.2)
PLATELET # BLD AUTO: 101 K/UL (ref 150–450)
PMV BLD AUTO: 11.3 FL (ref 9.4–12.3)
POTASSIUM SERPL-SCNC: 4.2 MMOL/L (ref 3.5–5.1)
RBC # BLD AUTO: 3.38 M/UL (ref 4.23–5.6)
SODIUM SERPL-SCNC: 141 MMOL/L (ref 136–145)
WBC # BLD AUTO: 3.7 K/UL (ref 4.3–11.1)

## 2024-05-17 RX ORDER — GLIPIZIDE 5 MG/1
TABLET ORAL
COMMUNITY
End: 2024-05-17

## 2024-05-17 RX ORDER — LEVOTHYROXINE SODIUM 0.03 MG/1
25 TABLET ORAL DAILY
Qty: 90 TABLET | Refills: 0 | Status: SHIPPED | OUTPATIENT
Start: 2024-05-17

## 2024-05-17 RX ORDER — FUROSEMIDE 20 MG/1
TABLET ORAL
COMMUNITY
End: 2024-05-17

## 2024-05-17 RX ORDER — PANTOPRAZOLE SODIUM 40 MG/1
40 TABLET, DELAYED RELEASE ORAL
Qty: 90 TABLET | Refills: 0 | Status: SHIPPED | OUTPATIENT
Start: 2024-05-17

## 2024-05-17 RX ORDER — GLIMEPIRIDE 1 MG/1
1 TABLET ORAL
Qty: 90 TABLET | Refills: 1 | Status: SHIPPED | OUTPATIENT
Start: 2024-05-17

## 2024-05-17 RX ORDER — ASPIRIN 81 MG/1
81 TABLET, CHEWABLE ORAL DAILY
Qty: 90 TABLET | Refills: 1 | Status: SHIPPED | OUTPATIENT
Start: 2024-05-17

## 2024-05-17 RX ORDER — LOSARTAN POTASSIUM 50 MG/1
TABLET ORAL
COMMUNITY
End: 2024-05-17

## 2024-05-17 RX ORDER — FERROUS SULFATE 325(65) MG
325 TABLET ORAL
Qty: 90 TABLET | Refills: 0 | Status: SHIPPED | OUTPATIENT
Start: 2024-05-17

## 2024-05-17 RX ORDER — LISINOPRIL 40 MG/1
20 TABLET ORAL DAILY
Qty: 45 TABLET | Refills: 1 | Status: SHIPPED | OUTPATIENT
Start: 2024-05-17

## 2024-05-17 ASSESSMENT — ENCOUNTER SYMPTOMS
VOMITING: 0
ANAL BLEEDING: 0
DIARRHEA: 0
ABDOMINAL PAIN: 0
NAUSEA: 0
CONSTIPATION: 0
BLOOD IN STOOL: 0
WHEEZING: 0

## 2024-05-17 ASSESSMENT — PATIENT HEALTH QUESTIONNAIRE - PHQ9
2. FEELING DOWN, DEPRESSED OR HOPELESS: NOT AT ALL
SUM OF ALL RESPONSES TO PHQ QUESTIONS 1-9: 0
SUM OF ALL RESPONSES TO PHQ9 QUESTIONS 1 & 2: 0
SUM OF ALL RESPONSES TO PHQ QUESTIONS 1-9: 0
SUM OF ALL RESPONSES TO PHQ QUESTIONS 1-9: 0
1. LITTLE INTEREST OR PLEASURE IN DOING THINGS: NOT AT ALL
SUM OF ALL RESPONSES TO PHQ QUESTIONS 1-9: 0

## 2024-05-17 NOTE — PROGRESS NOTES
Post-Discharge Transitional Care Follow Up      Beto Shipley   YOB: 1945    Date of Office Visit:  5/17/2024  Date of Hospital Admission: 5/13/24  Date of Hospital Discharge: 5/15/24  Readmission Risk Score (high >=14%. Medium >=10%):Readmission Risk Score: 14.4      Care management risk score Rising risk (score 2-5) and Complex Care (Scores >=6): No Risk Score On File     Non face to face  following discharge, date last encounter closed (first attempt may have been earlier): 05/16/2024     Call initiated 2 business days of discharge: Yes     Hospital discharge follow-up  -     CA DISCHARGE MEDS RECONCILED W/ CURRENT OUTPATIENT MED LIST  Hyperkalemia  -     Basic Metabolic Panel; Future  Recheck BMP today.     Constipation, unspecified constipation type  Continue Miralax.     Anemia, unspecified type  -     pantoprazole (PROTONIX) 40 MG tablet; Take 1 tablet by mouth every morning (before breakfast), Disp-90 tablet, R-0Normal  -     ferrous sulfate (IRON 325) 325 (65 Fe) MG tablet; Take 1 tablet by mouth daily (with breakfast), Disp-90 tablet, R-0Normal  -     CBC with Auto Differential; Future  Stable.      Medical Decision Making: high complexity  Return in about 3 months (around 8/17/2024).    On this date 5/17/2024 I have spent 40 minutes reviewing previous notes, test results and face to face with the patient discussing the diagnosis and importance of compliance with the treatment plan as well as documenting on the day of the visit.       Subjective:   HPI    Inpatient course: Discharge summary reviewed- see chart.    Interval history/Current status:     Hospital Course:  Beto Shipley is a 78 y.o. male with medical history of hypertension, diabetes, BPH, CKD who presented with blood-streaked stool.  Patient states that he was straining to have a hard bowel movement and noted some blood.  He presented to the hospital due to his symptoms.  In the ED vitals were stable.  Labs were

## 2024-05-22 ENCOUNTER — CARE COORDINATION (OUTPATIENT)
Dept: CARE COORDINATION | Facility: CLINIC | Age: 79
End: 2024-05-22

## 2024-05-22 NOTE — CARE COORDINATION
Care Transitions Outreach Attempt    Call within 2 business days of discharge: Yes   Attempted to reach patient for transitions of care follow up. Unable to reach patient.  Left voice message providing this LPN CC's contact information for return call.  Will continue to outreach per protocol.    Patient: Beto Shipley Patient : 1945 MRN: [id number]    Last Discharge Facility       Date Complaint Diagnosis Description Type Department Provider    24 Rectal Bleeding Hyperkalemia ... ED to Hosp-Admission (Discharged) (ADMITTED) [facility] Samia Bernstein DO; Canelo Hay...              Was this an external facility discharge? No Discharge Facility Name: [facility]    Noted following upcoming appointments from discharge chart review:   [facility] follow up appointment(s):   Future Appointments   Date Time Provider Department Center   2024  1:45 PM Zaire Huang DO [facility] AM[facility]   2024 11:00 AM [facility]   2024 11:45 AM Lawrence Toledo MD [facility]   2024  1:00 PM Alma Delia Shirley MD [facility] AM[facility]     Non-[facility]  follow up appointment(s): n/a

## 2024-05-30 ENCOUNTER — OFFICE VISIT (OUTPATIENT)
Age: 79
End: 2024-05-30
Payer: MEDICARE

## 2024-05-30 ENCOUNTER — CARE COORDINATION (OUTPATIENT)
Dept: CARE COORDINATION | Facility: CLINIC | Age: 79
End: 2024-05-30

## 2024-05-30 ENCOUNTER — TELEPHONE (OUTPATIENT)
Dept: DIABETES SERVICES | Age: 79
End: 2024-05-30

## 2024-05-30 VITALS
HEIGHT: 69 IN | BODY MASS INDEX: 17.63 KG/M2 | HEART RATE: 76 BPM | DIASTOLIC BLOOD PRESSURE: 66 MMHG | WEIGHT: 119 LBS | SYSTOLIC BLOOD PRESSURE: 104 MMHG

## 2024-05-30 DIAGNOSIS — I50.20 HFREF (HEART FAILURE WITH REDUCED EJECTION FRACTION) (HCC): Primary | ICD-10-CM

## 2024-05-30 DIAGNOSIS — I25.5 ISCHEMIC CARDIOMYOPATHY: ICD-10-CM

## 2024-05-30 PROCEDURE — 99214 OFFICE O/P EST MOD 30 MIN: CPT | Performed by: INTERNAL MEDICINE

## 2024-05-30 PROCEDURE — 1123F ACP DISCUSS/DSCN MKR DOCD: CPT | Performed by: INTERNAL MEDICINE

## 2024-05-30 PROCEDURE — 3078F DIAST BP <80 MM HG: CPT | Performed by: INTERNAL MEDICINE

## 2024-05-30 PROCEDURE — 3074F SYST BP LT 130 MM HG: CPT | Performed by: INTERNAL MEDICINE

## 2024-05-30 ASSESSMENT — ENCOUNTER SYMPTOMS
SHORTNESS OF BREATH: 0
COUGH: 0
GASTROINTESTINAL NEGATIVE: 1

## 2024-05-30 NOTE — CARE COORDINATION
Care Transitions Follow Up Call    Patient Current Location:  Home: [address] [place] [address]  [address]  [facility] [id number]    LPN Care Coordinator contacted the patient by telephone to follow up after admission on 24.  Verified name and  with patient as identifiers.    Patient: Beto Shipley  Patient : 1945   MRN: [id number]  Reason for Admission: Hyperkalemia  Discharge Date: 5/15/24 RARS: Readmission Risk Score: 14.4      Needs to be reviewed by the provider   Additional needs identified to be addressed with provider: No  none             Method of communication with provider: none.    Patient reports doing well at time of call.  Hospital f/u with pcp completed on 24.  No med changes noted.  Patient is taking miralax daily.    Recent labs showed potassium wnl at 4.5.  Noted blood sugar at 243.  Patient states he has not had any diabetes education and would be interested in this if eligible.  Referral to outpatient diabetes education sent on patient behalf.  Patient is scheduled to see  his cardiologist this afternoon.  No questions or concerns voiced at time of call.    Follow Up  Future Appointments   Date Time Provider Department Center   2024  1:45 PM Zaire Huang DO [facility] AMB   2024 11:00 AM [facility] ULTRASOUND 1 [facility] AMB   2024 11:45 AM Lawrence Toledo MD [facility] AMB   2024  1:00 PM Alma Delia Shirley MD [facility]     External follow up appointment(s): n/a    LPN Care Coordinator reviewed discharge instructions, medical action plan, and red flags with patient and discussed any barriers to care and/or understanding of plan of care after discharge. Discussed appropriate site of care based on symptoms and resources available to patient including: PCP  Specialist  Urgent care clinics  Critical access hospital  When to call 911. The patient agrees to contact the PCP office for questions related to their healthcare.     Advance Care Planning:   decision maker updated.

## 2024-05-30 NOTE — PROGRESS NOTES
[address], [address]     [place] [address]      Patient:  Beto Shipley  1945         SUBJECTIVE:  Beto Shipley is a  78 y.o. male seen for a follow up visit regarding the following:     Chief Complaint   Patient presents with    Follow-Up from Hospital    Cardiomyopathy     CC: CHF follow up after recent admission.    HPI:   77 y.o. male  with a history of CAD s/p CABG x 3, HTN, HLD, ischemic CM, mitral ring repair, tobacco use, CKD , PAD post popliteal artery angioplasty, DM who is here for follow up.    Patient was last seen in office on 12/21/23 with Dr Gutierrez , since then reports that he was admitted to [facility] 5/21/24-5/24/24. He   States that main symptoms were dyspnea, leg swelling. Was given lasix and his symptoms improved.  Having some swelling since discharge which is mild. No dyspnea, orthopnea. Some mild dyspnea with exertion. Overall better than when he went to the hospital. Had hyperkalemia and medications decreased.     Cardiovascular Testing:  -Echo [facility] 5/22/2024: LVEF 20-25%, RV normal systolic function, mild AI, MV gradient 5 mmHg, moderate TR, severe pulmonary hypertension.  - Echo 2/1/24: LVEF 25-30 %, RV mildly dilated RV, moderately reduced RVEF, Valves: Mild AI, mean MV gradient 5 mmHg, mild TR, RVSP 64 mmHg.  - Echo 3/11/22: LVEF 25-30 %, RV dilated/mod RV dysfunction, Valves: mod MR, minimally increase MV gradient 5 mmHg (HR 89 BPM), mod TR.   - US Aorta 2/5/19: max 20 mm, no AAA  - Echo 10/22/20 [facility] : LVEF 20-25%, severe global hypokinesis, RV function moderately reduced, mod MR, mild AI, severe TR, mild PH, mildly dilated aorta     Past medical history, past surgical history, family history, social history, and medications were all reviewed with the patient today and updated as necessary.         Patient Active Problem List    Diagnosis Date Noted    Smokers' cough (HCC) 02/10/2023     Priority: Medium    Acute on

## 2024-05-30 NOTE — TELEPHONE ENCOUNTER
Type 2, First call about DM education.  Let him know free.  He is interested but leaving for eye appointment and wants us to call him back next week.

## 2024-06-03 ENCOUNTER — FOLLOWUP TELEPHONE ENCOUNTER (OUTPATIENT)
Dept: DIABETES SERVICES | Age: 79
End: 2024-06-03

## 2024-06-03 NOTE — TELEPHONE ENCOUNTER
Called pt back to clarify if he wanted basics info or nutrition. Pt wanted nutrition diabetes education. Rescheduled pt for 6/11/24 at 9 am.

## 2024-06-07 ENCOUNTER — CARE COORDINATION (OUTPATIENT)
Dept: CARE COORDINATION | Facility: CLINIC | Age: 79
End: 2024-06-07

## 2024-06-07 ENCOUNTER — CLINICAL DOCUMENTATION (OUTPATIENT)
Dept: CARE COORDINATION | Facility: CLINIC | Age: 79
End: 2024-06-07

## 2024-06-07 NOTE — CARE COORDINATION
Care Transitions Outreach Attempt    Call within 2 business days of discharge: Yes   Attempted to reach patient for transitions of care follow up. Unable to reach patient for subsequent outreach.  Left voice message providing this [facility]'s contact information for return call.    Patient: Beto Shipley Patient : 1945 MRN: [id number]    Last Discharge Facility       Date Complaint Diagnosis Description Type Department Provider    24 Rectal Bleeding Hyperkalemia ... ED to Hosp-Admission (Discharged) (ADMITTED) [id number] Samia Bernstein, ; Canelo Hay...              Was this an external facility discharge? No Discharge Facility Name: [facility]    Noted following upcoming appointments from discharge chart review:   [facility] follow up appointment(s):   Future Appointments   Date Time Provider Department Center   2024  9:00 AM Martha Chavez RD [facility]   2024  2:30 PM Zaire Huang DO [facility] AMB   2024 11:00 AM [facility] ULTRASOUND 1 [facility] AMB   2024 11:45 AM Lawrence Toledo MD [facility] AMB   2024  1:00 PM Alma Delia Shirley MD [facility] AMB     Non-[facility]  follow up appointment(s): n/a

## 2024-06-11 ENCOUNTER — FOLLOWUP TELEPHONE ENCOUNTER (OUTPATIENT)
Dept: DIABETES SERVICES | Age: 79
End: 2024-06-11

## 2024-06-11 NOTE — TELEPHONE ENCOUNTER
Pt scheduled for telephone nutrition diabetes education today at 9 am. Called pt but no answer. Left message.

## 2024-06-14 ENCOUNTER — CARE COORDINATION (OUTPATIENT)
Dept: CARE COORDINATION | Facility: CLINIC | Age: 79
End: 2024-06-14

## 2024-06-14 NOTE — CARE COORDINATION
Patient has graduated from the Care Transitions program on 6/14/24.  Patient/family has the ability to self-manage at this time. Patient declined referral to the ACM team for further management.   Patient verbalized no issues other than ongoing pain to the soles of both feet due to DM neuropathies.  Patient states blood glucose was 143 this morning.  Patient has been contacted for Diabetes education as well.  Patient reports his pain management physician wrote prescription for Diclofenac cream with lidocaine which he will  at [facility].  Patient states his niece works there and will get it for him.   Patient was admitted to [facility] from 5/21-5/24/24 due to Acute on chronic HFrEF (heart failure with reduced ejection fraction).  Discussed ACM services with patient who declines further outreach at this time. Patient was seen by Zaire Huang MD (Cardiology) for hospital follow up on 5/30/24 Patient does agree to reach out should he need assistance in the future.  Provided patient with this LPN's contact information as well as CTN.  Future appointments reviewed and are scheduled appropriately.      Patient has Care Transition Nurse's contact information for any further questions, concerns, or needs.  Patients upcoming visits:    Future Appointments   Date Time Provider Department Center   7/12/2024  2:45 PM Zaire Huang DO [facility] AMB   8/16/2024 11:00 AM [facility] ULTRASOUND 1 [facility] AMB   8/16/2024 11:45 AM Lawrence Toledo MD [facility] AMB   8/21/2024  1:00 PM Alma Delia Shirley MD [facility] AMB

## 2024-06-21 ENCOUNTER — TELEPHONE (OUTPATIENT)
Dept: DIABETES SERVICES | Age: 79
End: 2024-06-21

## 2024-06-21 NOTE — TELEPHONE ENCOUNTER
Type 2 Called and left message about reschedule Nutrition, left call back number and free.  [phone]

## 2024-07-12 ENCOUNTER — OFFICE VISIT (OUTPATIENT)
Age: 79
End: 2024-07-12

## 2024-07-12 VITALS
BODY MASS INDEX: 17.45 KG/M2 | DIASTOLIC BLOOD PRESSURE: 80 MMHG | SYSTOLIC BLOOD PRESSURE: 142 MMHG | HEIGHT: 69 IN | WEIGHT: 117.8 LBS | HEART RATE: 71 BPM

## 2024-07-12 DIAGNOSIS — Z95.1 HX OF CABG: ICD-10-CM

## 2024-07-12 DIAGNOSIS — I25.10 CORONARY ARTERY DISEASE INVOLVING NATIVE CORONARY ARTERY OF NATIVE HEART WITHOUT ANGINA PECTORIS: ICD-10-CM

## 2024-07-12 DIAGNOSIS — R00.2 PALPITATIONS: ICD-10-CM

## 2024-07-12 DIAGNOSIS — I50.20 HFREF (HEART FAILURE WITH REDUCED EJECTION FRACTION) (HCC): Primary | ICD-10-CM

## 2024-07-12 DIAGNOSIS — I25.5 ISCHEMIC CARDIOMYOPATHY: ICD-10-CM

## 2024-07-12 DIAGNOSIS — E78.2 MIXED HYPERLIPIDEMIA: ICD-10-CM

## 2024-07-12 DIAGNOSIS — I10 ESSENTIAL HYPERTENSION: ICD-10-CM

## 2024-07-12 DIAGNOSIS — I50.20 SYSTOLIC HEART FAILURE, UNSPECIFIED HF CHRONICITY (HCC): ICD-10-CM

## 2024-07-12 RX ORDER — HYDROCODONE BITARTRATE AND ACETAMINOPHEN 10; 325 MG/1; MG/1
1 TABLET ORAL EVERY 6 HOURS PRN
COMMUNITY

## 2024-07-12 ASSESSMENT — ENCOUNTER SYMPTOMS
SHORTNESS OF BREATH: 1
COUGH: 0

## 2024-07-12 NOTE — PROGRESS NOTES
gradient 5 mmHg, mild TR, RVSP 64 mmHg.  - Echo 3/11/22: LVEF 25-30 %, RV dilated/mod RV dysfunction, Valves: mod MR, minimally increase MV gradient 5 mmHg (HR 89 BPM), mod TR.   - US Aorta 2/5/19: max 20 mm, no AAA  - Echo 10/22/20 [facility] : LVEF 20-25%, severe global hypokinesis, RV function moderately reduced, mod MR, mild AI, severe TR, mild PH, mildly dilated aorta .      1. Ischemic cardiomyopathy  2. Systolic CHF (NYHA class III, ACC/AHA stage C) ([facility])  - continue CHF medications : Coreg to 25 BID, continue lisinopril 20, torsemide.  Recent hyperkalemia, hold on addition of Aldactone for this reason. Can look to add ARB/ARNI or SGLT2i as below in future if possible.   -Discussed possible indication for ICD, patient now agreeable to EP consult. Order for EP referral placed previously, message sent today to EP scheduling.  - Torsemide 10 mg oral once daily.   - given recent palpitations will have patient wear 14 day telemetry.    3. Coronary artery disease involving native coronary artery of native heart without angina pectoris  4. Hx of CABG  -Continue ASA 81, Atorvastatin 40 mg, increase Coreg as above.  No recent chest pain.     5. Essential hypertension  -Controlled at this time     6. Mixed hyperlipidemia  -Continue atorvastatin 40 mg oral once daily.      7. Tobacco abuse  -Continue efforts towards complete tobacco cessation     8. Chronic kidney disease, unspecified CKD stage  -Labs from nephrology dated 7/9/2024 reviewed, creatinine 1.86.  Potassium 4.2.     9.  PAD with prior stenting  - Follows with vascular, continue ASA, Plavix, statin.  Needs complete cessation of tobacco use    10. DM Type 2  - manage as per PCP, recommend consideration of SGLT2 inhibitor given CHF. Defer to PCP.     Problem List Items Addressed This Visit          Circulatory    Essential hypertension    HFrEF (heart failure with reduced ejection fraction) ([facility]) - Primary    Ischemic cardiomyopathy       Other    Hyperlipidemia

## 2024-07-28 DIAGNOSIS — D64.9 ANEMIA, UNSPECIFIED TYPE: ICD-10-CM

## 2024-07-28 DIAGNOSIS — E03.8 SUBCLINICAL HYPOTHYROIDISM: ICD-10-CM

## 2024-07-29 RX ORDER — LEVOTHYROXINE SODIUM 25 UG/1
25 TABLET ORAL DAILY
Qty: 90 TABLET | Refills: 0 | OUTPATIENT
Start: 2024-07-29

## 2024-07-29 RX ORDER — PANTOPRAZOLE SODIUM 40 MG/1
40 TABLET, DELAYED RELEASE ORAL
Qty: 90 TABLET | Refills: 0 | OUTPATIENT
Start: 2024-07-29

## 2024-08-07 ENCOUNTER — TELEPHONE (OUTPATIENT)
Age: 79
End: 2024-08-07

## 2024-08-07 NOTE — TELEPHONE ENCOUNTER
----- Message from Zaire Huang DO sent at 8/6/2024  3:55 PM EDT -----  Please call the patient to let them know their results.     No sustained arrhythmias (or abnormal heart rhythms) were seen on the monitor you wore at home.

## 2024-08-21 ENCOUNTER — OFFICE VISIT (OUTPATIENT)
Dept: INTERNAL MEDICINE CLINIC | Facility: CLINIC | Age: 79
End: 2024-08-21

## 2024-08-21 ENCOUNTER — TELEPHONE (OUTPATIENT)
Dept: INTERNAL MEDICINE CLINIC | Facility: CLINIC | Age: 79
End: 2024-08-21

## 2024-08-21 VITALS
WEIGHT: 118.4 LBS | RESPIRATION RATE: 16 BRPM | SYSTOLIC BLOOD PRESSURE: 120 MMHG | HEART RATE: 70 BPM | BODY MASS INDEX: 17.54 KG/M2 | HEIGHT: 69 IN | TEMPERATURE: 98.5 F | OXYGEN SATURATION: 99 % | DIASTOLIC BLOOD PRESSURE: 70 MMHG

## 2024-08-21 DIAGNOSIS — D64.9 ANEMIA, UNSPECIFIED TYPE: ICD-10-CM

## 2024-08-21 DIAGNOSIS — E03.9 ACQUIRED HYPOTHYROIDISM: ICD-10-CM

## 2024-08-21 DIAGNOSIS — E78.5 DYSLIPIDEMIA: ICD-10-CM

## 2024-08-21 DIAGNOSIS — Z72.0 TOBACCO ABUSE: ICD-10-CM

## 2024-08-21 DIAGNOSIS — Z71.89 ACP (ADVANCE CARE PLANNING): ICD-10-CM

## 2024-08-21 DIAGNOSIS — E11.42 DIABETIC POLYNEUROPATHY ASSOCIATED WITH TYPE 2 DIABETES MELLITUS (HCC): ICD-10-CM

## 2024-08-21 DIAGNOSIS — E11.21 TYPE 2 DIABETES WITH NEPHROPATHY (HCC): ICD-10-CM

## 2024-08-21 DIAGNOSIS — R35.0 BENIGN PROSTATIC HYPERPLASIA WITH URINARY FREQUENCY: ICD-10-CM

## 2024-08-21 DIAGNOSIS — Z87.891 PERSONAL HISTORY OF TOBACCO USE: ICD-10-CM

## 2024-08-21 DIAGNOSIS — Z23 NEED FOR TDAP VACCINATION: ICD-10-CM

## 2024-08-21 DIAGNOSIS — E55.9 VITAMIN D INSUFFICIENCY: ICD-10-CM

## 2024-08-21 DIAGNOSIS — E11.21 TYPE 2 DIABETES WITH NEPHROPATHY (HCC): Primary | ICD-10-CM

## 2024-08-21 DIAGNOSIS — N40.1 BENIGN PROSTATIC HYPERPLASIA WITH URINARY FREQUENCY: ICD-10-CM

## 2024-08-21 DIAGNOSIS — I10 ESSENTIAL HYPERTENSION: Primary | ICD-10-CM

## 2024-08-21 DIAGNOSIS — I50.20 HFREF (HEART FAILURE WITH REDUCED EJECTION FRACTION) (HCC): ICD-10-CM

## 2024-08-21 DIAGNOSIS — M79.671 BILATERAL FOOT PAIN: ICD-10-CM

## 2024-08-21 DIAGNOSIS — N18.2 STAGE 2 CHRONIC KIDNEY DISEASE: ICD-10-CM

## 2024-08-21 DIAGNOSIS — M79.672 BILATERAL FOOT PAIN: ICD-10-CM

## 2024-08-21 PROBLEM — D75.89 MACROCYTOSIS: Status: ACTIVE | Noted: 2024-05-21

## 2024-08-21 PROBLEM — N39.0 URINARY TRACT INFECTION, SITE NOT SPECIFIED: Status: RESOLVED | Noted: 2023-07-24 | Resolved: 2024-08-21

## 2024-08-21 PROBLEM — G62.9 NEUROPATHY: Status: ACTIVE | Noted: 2024-07-05

## 2024-08-21 PROBLEM — I13.10 CARDIORENAL SYNDROME: Status: ACTIVE | Noted: 2024-05-21

## 2024-08-21 PROBLEM — N39.0 UTI DUE TO EXTENDED-SPECTRUM BETA LACTAMASE (ESBL) PRODUCING ESCHERICHIA COLI: Status: RESOLVED | Noted: 2021-08-04 | Resolved: 2024-08-21

## 2024-08-21 PROBLEM — N17.9 AKI (ACUTE KIDNEY INJURY) (HCC): Status: RESOLVED | Noted: 2021-08-04 | Resolved: 2024-08-21

## 2024-08-21 PROBLEM — B96.29 UTI DUE TO EXTENDED-SPECTRUM BETA LACTAMASE (ESBL) PRODUCING ESCHERICHIA COLI: Status: RESOLVED | Noted: 2021-08-04 | Resolved: 2024-08-21

## 2024-08-21 PROBLEM — Z16.12 UTI DUE TO EXTENDED-SPECTRUM BETA LACTAMASE (ESBL) PRODUCING ESCHERICHIA COLI: Status: RESOLVED | Noted: 2021-08-04 | Resolved: 2024-08-21

## 2024-08-21 PROBLEM — N13.30 HYDRONEPHROSIS, LEFT: Status: ACTIVE | Noted: 2024-05-21

## 2024-08-21 RX ORDER — FERROUS SULFATE 325(65) MG
325 TABLET ORAL
Qty: 90 TABLET | Refills: 0 | Status: SHIPPED | OUTPATIENT
Start: 2024-08-21

## 2024-08-21 RX ORDER — LANCETS 30 GAUGE
EACH MISCELLANEOUS
Qty: 100 EACH | Refills: 0 | Status: SHIPPED | OUTPATIENT
Start: 2024-08-21

## 2024-08-21 RX ORDER — ASCORBIC ACID 500 MG
500 TABLET ORAL DAILY
Qty: 30 TABLET | Refills: 3 | Status: SHIPPED | OUTPATIENT
Start: 2024-08-21

## 2024-08-21 RX ORDER — DICLOFENAC SODIUM 30 MG/G
GEL TOPICAL
COMMUNITY
Start: 2024-06-13

## 2024-08-21 RX ORDER — TORSEMIDE 10 MG/1
10 TABLET ORAL DAILY
COMMUNITY
Start: 2024-07-09

## 2024-08-21 RX ORDER — GLUCOSAMINE HCL/CHONDROITIN SU 500-400 MG
CAPSULE ORAL
Qty: 100 STRIP | Refills: 3 | Status: SHIPPED | OUTPATIENT
Start: 2024-08-21

## 2024-08-21 RX ORDER — GABAPENTIN 100 MG/1
100 CAPSULE ORAL NIGHTLY
Qty: 30 CAPSULE | Refills: 2 | Status: SHIPPED | OUTPATIENT
Start: 2024-08-21 | End: 2024-11-19

## 2024-08-21 ASSESSMENT — PATIENT HEALTH QUESTIONNAIRE - PHQ9
SUM OF ALL RESPONSES TO PHQ QUESTIONS 1-9: 0
2. FEELING DOWN, DEPRESSED OR HOPELESS: NOT AT ALL
SUM OF ALL RESPONSES TO PHQ9 QUESTIONS 1 & 2: 0
SUM OF ALL RESPONSES TO PHQ QUESTIONS 1-9: 0
1. LITTLE INTEREST OR PLEASURE IN DOING THINGS: NOT AT ALL
SUM OF ALL RESPONSES TO PHQ QUESTIONS 1-9: 0
SUM OF ALL RESPONSES TO PHQ QUESTIONS 1-9: 0

## 2024-08-21 ASSESSMENT — ENCOUNTER SYMPTOMS
COUGH: 0
BLOOD IN STOOL: 0
RHINORRHEA: 0
ABDOMINAL PAIN: 0
SHORTNESS OF BREATH: 0

## 2024-08-21 NOTE — PATIENT INSTRUCTIONS
Advance Care Planning     Advance Care Planning opens a door to talk about and write down your wishes before a sudden accident or illness.  Make your goals, values, and preferences known.     This puts you in the ’s seat and helps others know what matters most to you so they won’t have to guess.      Where can you learn more?    Go to [email or web address]   to learn how to:    Name someone you trust to make healthcare decisions for you, only if you can’t. (Healthcare Power of )    Document your wishes for care if you were seriously ill and not expected to recover or are approaching end of life. (Advance Directive or Living Will)    The same page can be found using the QR code below.                Learning About Lung Cancer Screening  What is screening for lung cancer?     Lung cancer screening is a way to find some lung cancers early, before a person has any symptoms of the cancer.  Lung cancer screening may help those who have the highest risk for lung cancer--people age 50 and older who are or were heavy smokers. For most people, who aren't at increased risk, screening for lung cancer probably isn't helpful.  Screening won't prevent cancer. And it may not find all lung cancers. Lung cancer screening may lower the risk of dying from lung cancer in a small number of people.  How is it done?  Lung cancer screening is done with a low-dose CT (computed tomography) scan. A CT scan uses X-rays, or radiation, to make detailed pictures of your body. Experts recommend that screening be done in medical centers that focus on finding and treating lung cancer.  Who is screening recommended for?  Lung cancer screening is recommended for people age 50 and older who are or were heavy smokers. That means people with a smoking history of at least 20 pack years. A pack year is a way to measure how heavy a smoker you are or were.  To figure out your pack years, multiply how

## 2024-08-21 NOTE — TELEPHONE ENCOUNTER
Patti from [facility] called, states 2 prescriptions need to be edited.    Rx for Accucheck Viva and fast click were sent in, but only Onetouch is covered by pt insurance. The switch is free, but a prescription will need to be sent for Onetouch meter.    Also needs to know max number of times pt can test per day.

## 2024-08-22 RX ORDER — BLOOD-GLUCOSE METER
EACH MISCELLANEOUS
Qty: 1 KIT | Refills: 0 | Status: SHIPPED | OUTPATIENT
Start: 2024-08-22

## 2024-08-22 RX ORDER — LANCETS 30 GAUGE
EACH MISCELLANEOUS
Qty: 100 EACH | Refills: 0 | Status: SHIPPED | OUTPATIENT
Start: 2024-08-22

## 2024-08-23 ENCOUNTER — OFFICE VISIT (OUTPATIENT)
Dept: VASCULAR SURGERY | Age: 79
End: 2024-08-23
Payer: MEDICARE

## 2024-08-23 VITALS
OXYGEN SATURATION: 97 % | WEIGHT: 120 LBS | SYSTOLIC BLOOD PRESSURE: 95 MMHG | HEART RATE: 72 BPM | HEIGHT: 69 IN | DIASTOLIC BLOOD PRESSURE: 56 MMHG | BODY MASS INDEX: 17.77 KG/M2

## 2024-08-23 DIAGNOSIS — I73.9 PVD (PERIPHERAL VASCULAR DISEASE) WITH CLAUDICATION (HCC): Primary | ICD-10-CM

## 2024-08-23 PROCEDURE — 1123F ACP DISCUSS/DSCN MKR DOCD: CPT | Performed by: SURGERY

## 2024-08-23 PROCEDURE — 3078F DIAST BP <80 MM HG: CPT | Performed by: SURGERY

## 2024-08-23 PROCEDURE — 3074F SYST BP LT 130 MM HG: CPT | Performed by: SURGERY

## 2024-08-23 PROCEDURE — 99213 OFFICE O/P EST LOW 20 MIN: CPT | Performed by: SURGERY

## 2024-08-23 NOTE — PROGRESS NOTES
[address] [address]  [phone] FAX: [phone]    Beto Shipley  : 1945    Chief Complaint:     History of Present Illness:   Patient follows up today for history of peripheral vascular disease status post bilateral SFA stents years back.  Both stents are occluded.  Patient denies any claudication or rest pain symptoms.    CURRENT MEDICATIONS:  Current Outpatient Medications   Medication Sig Dispense Refill    Cholecalciferol (VITAMIN D3) 125 MCG (5000 UT) CAPS Take 1 capsule by mouth daily      empagliflozin (JARDIANCE) 25 MG tablet Take 1 tablet by mouth daily      sacubitril-valsartan (ENTRESTO) 24-26 MG per tablet Take 1 tablet by mouth 2 times daily      torsemide (DEMADEX) 10 MG tablet Take 1 tablet by mouth daily      ferrous sulfate (IRON 325) 325 (65 Fe) MG tablet Take 1 tablet by mouth daily (with breakfast) 90 tablet 0    vitamin C (ASCORBIC ACID) 500 MG tablet Take 1 tablet by mouth daily 30 tablet 3    gabapentin (NEURONTIN) 100 MG capsule Take 1 capsule by mouth nightly for 90 days. 30 capsule 2    levothyroxine (SYNTHROID) 25 MCG tablet Take 1 tablet by mouth Daily 90 tablet 0    aspirin 81 MG chewable tablet Take 1 tablet by mouth daily 90 tablet 1    tamsulosin (FLOMAX) 0.4 MG capsule Take 1 capsule by mouth nightly 90 capsule 3    carvedilol (COREG) 25 MG tablet Take 1 tablet by mouth 2 times daily 180 tablet 3    clopidogrel (PLAVIX) 75 MG tablet Take 1 tablet by mouth daily 90 tablet 3    atorvastatin (LIPITOR) 40 MG tablet Take 1 tablet by mouth daily 90 tablet 3    blood glucose test strips (ASCENSIA AUTODISC VI;ONE TOUCH ULTRA TEST VI) strip One touch test strips, check blood sugar qd, DM2, E11.9 100 each 3    Blood Glucose Monitoring Suppl (ONE TOUCH ULTRA 2) w/Device KIT One touch glucometer, check blood sugar qd, DM2, E11.9 1 kit 0    Lancets MISC One touch lancets, check blood sugar qd, DM2, E11.9 100 each 0    nicotine

## 2024-09-10 DIAGNOSIS — D64.9 ANEMIA, UNSPECIFIED TYPE: ICD-10-CM

## 2024-09-10 RX ORDER — PANTOPRAZOLE SODIUM 40 MG/1
40 TABLET, DELAYED RELEASE ORAL
Qty: 90 TABLET | Refills: 0 | Status: SHIPPED | OUTPATIENT
Start: 2024-09-10

## 2024-09-19 ENCOUNTER — TELEPHONE (OUTPATIENT)
Dept: INTERNAL MEDICINE CLINIC | Facility: CLINIC | Age: 79
End: 2024-09-19

## 2024-10-03 ENCOUNTER — OFFICE VISIT (OUTPATIENT)
Dept: INTERNAL MEDICINE CLINIC | Facility: CLINIC | Age: 79
End: 2024-10-03
Payer: MEDICARE

## 2024-10-03 VITALS
OXYGEN SATURATION: 96 % | SYSTOLIC BLOOD PRESSURE: 128 MMHG | HEIGHT: 69 IN | HEART RATE: 75 BPM | BODY MASS INDEX: 16.74 KG/M2 | WEIGHT: 113 LBS | TEMPERATURE: 97.7 F | DIASTOLIC BLOOD PRESSURE: 62 MMHG

## 2024-10-03 DIAGNOSIS — K22.2 ESOPHAGEAL OBSTRUCTION: Primary | ICD-10-CM

## 2024-10-03 DIAGNOSIS — L84 CORN OF FOOT: ICD-10-CM

## 2024-10-03 DIAGNOSIS — L84 CALLUS OF HEEL: ICD-10-CM

## 2024-10-03 PROCEDURE — 1123F ACP DISCUSS/DSCN MKR DOCD: CPT | Performed by: NURSE PRACTITIONER

## 2024-10-03 PROCEDURE — 3074F SYST BP LT 130 MM HG: CPT | Performed by: NURSE PRACTITIONER

## 2024-10-03 PROCEDURE — 3078F DIAST BP <80 MM HG: CPT | Performed by: NURSE PRACTITIONER

## 2024-10-03 PROCEDURE — 99214 OFFICE O/P EST MOD 30 MIN: CPT | Performed by: NURSE PRACTITIONER

## 2024-10-03 ASSESSMENT — ENCOUNTER SYMPTOMS
ABDOMINAL PAIN: 0
COUGH: 0
VOMITING: 0
TROUBLE SWALLOWING: 0
NAUSEA: 0
SORE THROAT: 0

## 2024-10-03 NOTE — PROGRESS NOTES
capsule, Take 1 capsule by mouth nightly for 90 days., Disp: 30 capsule, Rfl: 2    HYDROcodone-acetaminophen (NORCO)  MG per tablet, Take 1 tablet by mouth every 6 hours as needed for Pain., Disp: , Rfl:     glimepiride (AMARYL) 1 MG tablet, Take 1 tablet by mouth every morning (before breakfast) TAKE IN PLACE OF GLIPIZIDE. DO NOT TAKE THIS IF FASTING BLOOD SUGAR < 150., Disp: 90 tablet, Rfl: 1    levothyroxine (SYNTHROID) 25 MCG tablet, Take 1 tablet by mouth Daily, Disp: 90 tablet, Rfl: 0    aspirin 81 MG chewable tablet, Take 1 tablet by mouth daily, Disp: 90 tablet, Rfl: 1    lisinopril (PRINIVIL;ZESTRIL) 40 MG tablet, Take 0.5 tablets by mouth daily, Disp: 45 tablet, Rfl: 1    tamsulosin (FLOMAX) 0.4 MG capsule, Take 1 capsule by mouth nightly, Disp: 90 capsule, Rfl: 3    carvedilol (COREG) 25 MG tablet, Take 1 tablet by mouth 2 times daily, Disp: 180 tablet, Rfl: 3    clopidogrel (PLAVIX) 75 MG tablet, Take 1 tablet by mouth daily, Disp: 90 tablet, Rfl: 3    ipratropium 0.5 mg-albuterol 2.5 mg (DUONEB) 0.5-2.5 (3) MG/3ML SOLN nebulizer solution, Inhale 3 mLs into the lungs every 6 hours, Disp: , Rfl:     atorvastatin (LIPITOR) 40 MG tablet, Take 1 tablet by mouth daily, Disp: 90 tablet, Rfl: 3    Nutritional Supplements (ENSURE PLUS HIGH PROTEIN) LIQD, Please dispense single case of ensure to take BID as meal supplement., Disp: 237 mL, Rfl: 2    nystatin (MYCOSTATIN) 532094 UNIT/GM cream, Apply topically 2 times daily., Disp: 30 g, Rfl: 0    SYMBICORT 80-4.5 MCG/ACT AERO, , Disp: , Rfl:     albuterol sulfate HFA (PROVENTIL;VENTOLIN;PROAIR) 108 (90 Base) MCG/ACT inhaler, Inhale 2 puffs into the lungs every 4 hours as needed, Disp: , Rfl:     nicotine polacrilex (NICORETTE) 2 MG gum, Take 1 each by mouth every 2 hours as needed (Patient not taking: Reported on 10/3/2024), Disp: , Rfl:     Allergies   Allergen Reactions    Iohexol Hives    Other Hives    Iodine Rash       Review of Systems   Constitutional:

## 2024-10-15 RX ORDER — ASPIRIN 81 MG/1
81 TABLET, CHEWABLE ORAL
Qty: 90 TABLET | Refills: 1 | OUTPATIENT
Start: 2024-10-15

## 2024-10-17 DIAGNOSIS — D64.9 ANEMIA, UNSPECIFIED TYPE: ICD-10-CM

## 2024-10-17 RX ORDER — PANTOPRAZOLE SODIUM 40 MG/1
40 TABLET, DELAYED RELEASE ORAL
Qty: 90 TABLET | Refills: 0 | OUTPATIENT
Start: 2024-10-17

## 2024-10-20 DIAGNOSIS — I10 ESSENTIAL HYPERTENSION: ICD-10-CM

## 2024-10-20 DIAGNOSIS — E11.59 TYPE 2 DIABETES MELLITUS WITH OTHER CIRCULATORY COMPLICATION, WITHOUT LONG-TERM CURRENT USE OF INSULIN (HCC): ICD-10-CM

## 2024-10-20 RX ORDER — LISINOPRIL 40 MG/1
20 TABLET ORAL DAILY
Qty: 45 TABLET | Refills: 1 | OUTPATIENT
Start: 2024-10-20

## 2024-10-20 RX ORDER — GLIMEPIRIDE 1 MG/1
TABLET ORAL
Qty: 90 TABLET | Refills: 1 | OUTPATIENT
Start: 2024-10-20

## 2024-11-07 PROBLEM — T18.108A ESOPHAGEAL FOREIGN BODY, INITIAL ENCOUNTER: Status: ACTIVE | Noted: 2018-01-19

## 2024-11-10 DIAGNOSIS — D64.9 ANEMIA, UNSPECIFIED TYPE: ICD-10-CM

## 2024-11-10 RX ORDER — FERROUS SULFATE 325(65) MG
1 TABLET ORAL
Qty: 90 TABLET | Refills: 0 | OUTPATIENT
Start: 2024-11-10

## 2024-11-15 ENCOUNTER — OFFICE VISIT (OUTPATIENT)
Dept: INTERNAL MEDICINE CLINIC | Facility: CLINIC | Age: 79
End: 2024-11-15

## 2024-11-15 VITALS
SYSTOLIC BLOOD PRESSURE: 130 MMHG | TEMPERATURE: 97.2 F | HEART RATE: 89 BPM | BODY MASS INDEX: 18.22 KG/M2 | HEIGHT: 69 IN | OXYGEN SATURATION: 97 % | WEIGHT: 123 LBS | DIASTOLIC BLOOD PRESSURE: 64 MMHG

## 2024-11-15 DIAGNOSIS — I25.111 ATHEROSCLEROSIS OF NATIVE CORONARY ARTERY OF NATIVE HEART WITH ANGINA PECTORIS WITH DOCUMENTED SPASM (HCC): ICD-10-CM

## 2024-11-15 DIAGNOSIS — J31.0 RHINITIS, UNSPECIFIED TYPE: ICD-10-CM

## 2024-11-15 DIAGNOSIS — Z23 NEED FOR PNEUMOCOCCAL VACCINE: ICD-10-CM

## 2024-11-15 DIAGNOSIS — N18.4 STAGE 4 CHRONIC KIDNEY DISEASE (HCC): ICD-10-CM

## 2024-11-15 DIAGNOSIS — D69.6 THROMBOCYTOPENIA (HCC): ICD-10-CM

## 2024-11-15 DIAGNOSIS — E11.40 CONTROLLED TYPE 2 DIABETES WITH NEUROPATHY (HCC): ICD-10-CM

## 2024-11-15 DIAGNOSIS — J41.1 MUCOPURULENT CHRONIC BRONCHITIS (HCC): ICD-10-CM

## 2024-11-15 DIAGNOSIS — I48.91 ATRIAL FIBRILLATION, UNSPECIFIED TYPE (HCC): ICD-10-CM

## 2024-11-15 DIAGNOSIS — Z23 NEED FOR INFLUENZA VACCINATION: ICD-10-CM

## 2024-11-15 DIAGNOSIS — K21.9 GASTROESOPHAGEAL REFLUX DISEASE WITHOUT ESOPHAGITIS: ICD-10-CM

## 2024-11-15 DIAGNOSIS — Z00.00 MEDICARE ANNUAL WELLNESS VISIT, SUBSEQUENT: Primary | ICD-10-CM

## 2024-11-15 DIAGNOSIS — E44.0 MODERATE PROTEIN-CALORIE MALNUTRITION (HCC): ICD-10-CM

## 2024-11-15 DIAGNOSIS — I73.9 PERIPHERAL VASCULAR DISEASE OF EXTREMITY WITH CLAUDICATION (HCC): ICD-10-CM

## 2024-11-15 DIAGNOSIS — F17.209 TOBACCO USE DISORDER, CONTINUOUS: ICD-10-CM

## 2024-11-15 DIAGNOSIS — I50.23 ACUTE ON CHRONIC SYSTOLIC (CONGESTIVE) HEART FAILURE (HCC): ICD-10-CM

## 2024-11-15 PROBLEM — L97.511 SKIN ULCER OF RIGHT GREAT TOE, LIMITED TO BREAKDOWN OF SKIN (HCC): Status: RESOLVED | Noted: 2022-05-10 | Resolved: 2024-11-15

## 2024-11-15 RX ORDER — FLUTICASONE PROPIONATE 50 MCG
2 SPRAY, SUSPENSION (ML) NASAL DAILY
Qty: 48 G | Refills: 1 | Status: SHIPPED | OUTPATIENT
Start: 2024-11-15

## 2024-11-15 RX ORDER — FAMOTIDINE 20 MG/1
20 TABLET, FILM COATED ORAL
Qty: 60 TABLET | Refills: 3 | Status: SHIPPED | OUTPATIENT
Start: 2024-11-15

## 2024-11-15 ASSESSMENT — PATIENT HEALTH QUESTIONNAIRE - PHQ9
SUM OF ALL RESPONSES TO PHQ QUESTIONS 1-9: 0
SUM OF ALL RESPONSES TO PHQ9 QUESTIONS 1 & 2: 0
2. FEELING DOWN, DEPRESSED OR HOPELESS: NOT AT ALL
8. MOVING OR SPEAKING SO SLOWLY THAT OTHER PEOPLE COULD HAVE NOTICED. OR THE OPPOSITE, BEING SO FIGETY OR RESTLESS THAT YOU HAVE BEEN MOVING AROUND A LOT MORE THAN USUAL: NOT AT ALL
10. IF YOU CHECKED OFF ANY PROBLEMS, HOW DIFFICULT HAVE THESE PROBLEMS MADE IT FOR YOU TO DO YOUR WORK, TAKE CARE OF THINGS AT HOME, OR GET ALONG WITH OTHER PEOPLE: NOT DIFFICULT AT ALL
SUM OF ALL RESPONSES TO PHQ QUESTIONS 1-9: 0
3. TROUBLE FALLING OR STAYING ASLEEP: NOT AT ALL
1. LITTLE INTEREST OR PLEASURE IN DOING THINGS: NOT AT ALL
7. TROUBLE CONCENTRATING ON THINGS, SUCH AS READING THE NEWSPAPER OR WATCHING TELEVISION: NOT AT ALL
9. THOUGHTS THAT YOU WOULD BE BETTER OFF DEAD, OR OF HURTING YOURSELF: NOT AT ALL
SUM OF ALL RESPONSES TO PHQ QUESTIONS 1-9: 0
4. FEELING TIRED OR HAVING LITTLE ENERGY: NOT AT ALL
SUM OF ALL RESPONSES TO PHQ QUESTIONS 1-9: 0
5. POOR APPETITE OR OVEREATING: NOT AT ALL
6. FEELING BAD ABOUT YOURSELF - OR THAT YOU ARE A FAILURE OR HAVE LET YOURSELF OR YOUR FAMILY DOWN: NOT AT ALL

## 2024-11-15 ASSESSMENT — LIFESTYLE VARIABLES
HOW MANY STANDARD DRINKS CONTAINING ALCOHOL DO YOU HAVE ON A TYPICAL DAY: 1 OR 2
HOW OFTEN DO YOU HAVE A DRINK CONTAINING ALCOHOL: 2-3 TIMES A WEEK

## 2024-11-15 NOTE — PATIENT INSTRUCTIONS
your blood pressure. Taste food before salting. Add only a little salt when you think you need it. With time, your taste buds will adjust to less salt.     Eat fewer snack items, fast foods, canned soups, and other high-salt, high-fat, processed foods.     Read food labels and try to avoid saturated and trans fats. They increase your risk of heart disease by raising cholesterol levels.     Limit the amount of solid fat--butter, margarine, and shortening--you eat. Use olive, peanut, or canola oil when you cook. Bake, broil, and steam foods instead of frying them.     Eat a variety of fruit and vegetables every day. Dark green, deep orange, red, or yellow fruits and vegetables are especially good for you. Examples include spinach, carrots, peaches, and berries.     Foods high in fiber can reduce your cholesterol and provide important vitamins and minerals. High-fiber foods include whole-grain cereals and breads, oatmeal, beans, brown rice, citrus fruits, and apples.     Eat lean proteins. Heart-healthy proteins include seafood, lean meats and poultry, eggs, beans, peas, nuts, seeds, and soy products.     Limit drinks and foods with added sugar. These include candy, desserts, and soda pop.   Heart-healthy lifestyle    If your doctor recommends it, get more exercise. For many people, walking is a good choice. Or you may want to swim, bike, or do other activities. Bit by bit, increase the time you're active every day. Try for at least 30 minutes on most days of the week.     Try to quit or cut back on using tobacco and other nicotine products. This includes smoking and vaping. If you need help quitting, talk to your doctor about stop-smoking programs and medicines. These can increase your chances of quitting for good. Quitting is one of the most important things you can do to protect your heart. It is never too late to quit. Try to avoid secondhand smoke too.     Stay at a weight that's healthy for you. Talk to your

## 2024-11-15 NOTE — PROGRESS NOTES
Medicare Annual Wellness Visit    Beto Shipley is here for Medicare AWV    Assessment & Plan   Medicare annual wellness visit, subsequent  BMI < 18.5  Moderate protein-calorie malnutrition (HCC)  Acute on chronic systolic (congestive) heart failure (HCC)  Comments:  managed by cardiology  Mucopurulent chronic bronchitis (HCC)  Atrial fibrillation, unspecified type ([facility])  Comments:  managed by cardiology  Thrombocytopenia ([facility])  Atherosclerosis of native coronary artery of native heart with angina pectoris with documented spasm ([facility])  Comments:  managed by cardiology  Stage 4 chronic kidney disease ([facility])  Comments:  managed by nephrology  Peripheral vascular disease of extremity with claudication ([facility])  Comments:  managed by vascular surgery, s/p bypass sx in RLE  Controlled type 2 diabetes with neuropathy ([facility])  Tobacco use disorder, continuous  Gastroesophageal reflux disease without esophagitis  -     famotidine (PEPCID) 20 MG tablet; Take 1 tablet by mouth nightly, Disp-60 tablet, R-3Normal  Rhinitis, unspecified type  -     fluticasone (FLONASE) 50 MCG/ACT nasal spray; 2 sprays by Each Nostril route daily, Disp-48 g, R-1Normal      Recommendations for Preventive Services Due: see orders and patient instructions/AVS.  Recommended screening schedule for the next 5-10 years is provided to the patient in written form: see Patient Instructions/AVS.         Return for med refills, get fasting labs prior to appt.     Subjective   The following acute and/or chronic problems were also addressed today:  Chronic rhinitis- reports some sneezing. He denies significant nasal congestion, post nasal drip, cough. He does not take any otc medication for this issue  GERD- taking protonix daily but gets a lot of breakthrough heartburn. He denies abdominal pain, n/v/d, constipation. Reports dysphagia and required ED visit in 09/24 for esophageal obstruction (medication).     Patient's complete Health Risk Assessment and

## 2024-11-19 ENCOUNTER — OFFICE VISIT (OUTPATIENT)
Age: 79
End: 2024-11-19

## 2024-11-19 VITALS
SYSTOLIC BLOOD PRESSURE: 128 MMHG | HEIGHT: 69 IN | DIASTOLIC BLOOD PRESSURE: 70 MMHG | HEART RATE: 96 BPM | WEIGHT: 120.4 LBS | BODY MASS INDEX: 17.83 KG/M2

## 2024-11-19 DIAGNOSIS — I10 ESSENTIAL HYPERTENSION: ICD-10-CM

## 2024-11-19 DIAGNOSIS — N18.9 CHRONIC KIDNEY DISEASE, UNSPECIFIED CKD STAGE: ICD-10-CM

## 2024-11-19 DIAGNOSIS — I50.20 HFREF (HEART FAILURE WITH REDUCED EJECTION FRACTION) (HCC): ICD-10-CM

## 2024-11-19 DIAGNOSIS — Z72.0 TOBACCO USE: ICD-10-CM

## 2024-11-19 DIAGNOSIS — I73.9 PVD (PERIPHERAL VASCULAR DISEASE) (HCC): ICD-10-CM

## 2024-11-19 DIAGNOSIS — Z95.1 HX OF CABG: ICD-10-CM

## 2024-11-19 DIAGNOSIS — I25.10 CORONARY ARTERY DISEASE INVOLVING NATIVE CORONARY ARTERY OF NATIVE HEART WITHOUT ANGINA PECTORIS: ICD-10-CM

## 2024-11-19 DIAGNOSIS — E78.2 MIXED HYPERLIPIDEMIA: ICD-10-CM

## 2024-11-19 DIAGNOSIS — I25.5 ISCHEMIC CARDIOMYOPATHY: ICD-10-CM

## 2024-11-19 DIAGNOSIS — R00.2 PALPITATIONS: Primary | ICD-10-CM

## 2024-11-19 ASSESSMENT — ENCOUNTER SYMPTOMS
RESPIRATORY NEGATIVE: 1
COUGH: 0
GASTROINTESTINAL NEGATIVE: 1
SLEEP DISTURBANCES DUE TO BREATHING: 0

## 2024-11-19 NOTE — PROGRESS NOTES
torsemide.  Recent hyperkalemia, hold on addition of Aldactone for this reason.   -Discussed possible indication for ICD, patient now agreeable to EP consult, patient agreeable to seeing EP in office. Order for EP referral replaced today.  - mild ankle swelling today.   - Torsemide 10 mg oral once daily. Monitor renal function given CKD. Sees renal regularly.   - given recent palpitations will have patient wear 14 day telemetry.     3. Coronary artery disease involving native coronary artery of native heart without angina pectoris  4. Hx of CABG  -Continue ASA 81, Atorvastatin 40 mg, increase Coreg as above.  No recent chest pain.     5. Essential hypertension  -Controlled at this time     6. Mixed hyperlipidemia  -Continue atorvastatin 40 mg oral once daily.      7. Tobacco abuse  -Continue efforts towards complete tobacco cessation     8. Chronic kidney disease, unspecified CKD stage  -Labs from nephrology dated 7/9/2024 reviewed, creatinine 1.86.  Potassium 4.2.     9.  PAD with prior stenting  - Follows with vascular, continue ASA, Plavix, statin.  Needs complete cessation of tobacco use     10. DM Type 2  - manage as per PCP, recommend consideration of SGLT2 inhibitor given CHF. Defer to PCP.     11. Thrombocytopenia  - on ASA 81 given CABG, vascular disease, no bleeding.  - chronic, per pcp, consider hematology evaluation.     Labs from [employer] dated 7/9/2 personally reviewed, demonstrates creatinine 1.86.  Potassium 4.2, hemoglobin 10.4.  Platelets 94.          Problem List Items Addressed This Visit          Circulatory    HFrEF (heart failure with reduced ejection fraction) (HCC)     Other Visit Diagnoses       Palpitations    -  Primary            Instructed patient go to ER or call 911/EMS should symptoms recur or worsen.     Patient has been instructed and agrees to call our office with any issues or other concerns related to their cardiac condition(s) and/or complaint(s).    No follow-ups on

## 2024-12-04 ENCOUNTER — OFFICE VISIT (OUTPATIENT)
Dept: INTERNAL MEDICINE CLINIC | Facility: CLINIC | Age: 79
End: 2024-12-04
Payer: MEDICARE

## 2024-12-04 ENCOUNTER — LAB (OUTPATIENT)
Dept: INTERNAL MEDICINE CLINIC | Facility: CLINIC | Age: 79
End: 2024-12-04

## 2024-12-04 VITALS
RESPIRATION RATE: 12 BRPM | HEART RATE: 97 BPM | DIASTOLIC BLOOD PRESSURE: 56 MMHG | TEMPERATURE: 96.6 F | SYSTOLIC BLOOD PRESSURE: 128 MMHG | WEIGHT: 125.4 LBS | OXYGEN SATURATION: 96 % | HEIGHT: 69 IN | BODY MASS INDEX: 18.57 KG/M2

## 2024-12-04 DIAGNOSIS — R06.02 SHORTNESS OF BREATH: Primary | ICD-10-CM

## 2024-12-04 DIAGNOSIS — I50.22 CHRONIC SYSTOLIC HEART FAILURE (HCC): ICD-10-CM

## 2024-12-04 DIAGNOSIS — R79.89 ELEVATED BRAIN NATRIURETIC PEPTIDE (BNP) LEVEL: ICD-10-CM

## 2024-12-04 DIAGNOSIS — J44.9 CHRONIC OBSTRUCTIVE PULMONARY DISEASE, UNSPECIFIED COPD TYPE (HCC): ICD-10-CM

## 2024-12-04 DIAGNOSIS — K21.9 GASTROESOPHAGEAL REFLUX DISEASE WITHOUT ESOPHAGITIS: ICD-10-CM

## 2024-12-04 LAB
ANION GAP SERPL CALC-SCNC: 10 MMOL/L (ref 7–16)
BUN SERPL-MCNC: 26 MG/DL (ref 8–23)
CALCIUM SERPL-MCNC: 8.6 MG/DL (ref 8.8–10.2)
CHLORIDE SERPL-SCNC: 106 MMOL/L (ref 98–107)
CO2 SERPL-SCNC: 27 MMOL/L (ref 20–29)
CREAT SERPL-MCNC: 1.56 MG/DL (ref 0.8–1.3)
GLUCOSE SERPL-MCNC: 122 MG/DL (ref 70–99)
POTASSIUM SERPL-SCNC: 4.4 MMOL/L (ref 3.5–5.1)
SODIUM SERPL-SCNC: 143 MMOL/L (ref 136–145)

## 2024-12-04 PROCEDURE — 99214 OFFICE O/P EST MOD 30 MIN: CPT | Performed by: PHYSICIAN ASSISTANT

## 2024-12-04 PROCEDURE — 1123F ACP DISCUSS/DSCN MKR DOCD: CPT | Performed by: PHYSICIAN ASSISTANT

## 2024-12-04 PROCEDURE — 3078F DIAST BP <80 MM HG: CPT | Performed by: PHYSICIAN ASSISTANT

## 2024-12-04 PROCEDURE — 1126F AMNT PAIN NOTED NONE PRSNT: CPT | Performed by: PHYSICIAN ASSISTANT

## 2024-12-04 PROCEDURE — 1159F MED LIST DOCD IN RCRD: CPT | Performed by: PHYSICIAN ASSISTANT

## 2024-12-04 PROCEDURE — 3074F SYST BP LT 130 MM HG: CPT | Performed by: PHYSICIAN ASSISTANT

## 2024-12-04 RX ORDER — FLUTICASONE FUROATE, UMECLIDINIUM BROMIDE AND VILANTEROL TRIFENATATE 100; 62.5; 25 UG/1; UG/1; UG/1
1 POWDER RESPIRATORY (INHALATION) DAILY
Qty: 3 EACH | Refills: 1 | Status: SHIPPED | OUTPATIENT
Start: 2024-12-04

## 2024-12-04 RX ORDER — PANTOPRAZOLE SODIUM 40 MG/1
40 TABLET, DELAYED RELEASE ORAL
Qty: 90 TABLET | Refills: 1 | Status: SHIPPED | OUTPATIENT
Start: 2024-12-04

## 2024-12-04 RX ORDER — ALBUTEROL SULFATE 90 UG/1
2 INHALANT RESPIRATORY (INHALATION) 4 TIMES DAILY PRN
Qty: 54 G | Refills: 1 | Status: SHIPPED | OUTPATIENT
Start: 2024-12-04

## 2024-12-04 SDOH — ECONOMIC STABILITY: FOOD INSECURITY: WITHIN THE PAST 12 MONTHS, THE FOOD YOU BOUGHT JUST DIDN'T LAST AND YOU DIDN'T HAVE MONEY TO GET MORE.: NEVER TRUE

## 2024-12-04 SDOH — ECONOMIC STABILITY: INCOME INSECURITY: HOW HARD IS IT FOR YOU TO PAY FOR THE VERY BASICS LIKE FOOD, HOUSING, MEDICAL CARE, AND HEATING?: NOT HARD AT ALL

## 2024-12-04 SDOH — ECONOMIC STABILITY: FOOD INSECURITY: WITHIN THE PAST 12 MONTHS, YOU WORRIED THAT YOUR FOOD WOULD RUN OUT BEFORE YOU GOT MONEY TO BUY MORE.: NEVER TRUE

## 2024-12-04 NOTE — PROGRESS NOTES
rhythm.      Heart sounds: Normal heart sounds. No murmur heard.  Pulmonary:      Effort: Pulmonary effort is normal.      Breath sounds: Decreased air movement present. Wheezing present.   Musculoskeletal:      Right lower le+ Edema present.      Left lower le+ Edema present.   Skin:     General: Skin is warm and dry.   Neurological:      Mental Status: He is alert and oriented to person, place, and time.   Psychiatric:         Attention and Perception: Attention normal.         Mood and Affect: Mood and affect normal.         Speech: Speech normal.         Behavior: Behavior normal.          ASSESSMENT AND PLAN:  Beto Montes De Oca was seen today for follow-up.    Diagnoses and all orders for this visit:    Shortness of breath    Chronic systolic heart failure (HCC)  -     Brain Natriuretic Peptide; Future  -     Basic Metabolic Panel; Future    Chronic obstructive pulmonary disease, unspecified COPD type (HCC)  -     albuterol sulfate HFA (VENTOLIN HFA) 108 (90 Base) MCG/ACT inhaler; Inhale 2 puffs into the lungs 4 times daily as needed for Wheezing  -     fluticasone-umeclidin-vilant (TRELEGY ELLIPTA) 100-62.5-25 MCG/ACT AEPB inhaler; Inhale 1 puff into the lungs daily    Elevated brain natriuretic peptide (BNP) level  -     Brain Natriuretic Peptide; Future    Gastroesophageal reflux disease without esophagitis  -     pantoprazole (PROTONIX) 40 MG tablet; Take 1 tablet by mouth every morning (before breakfast)    Continue torsemide 10mg daily as prescribed by cardiology. Refilled albuterol and will start trelegy for COPD maintenance if affordable. Smoking cessation always encouraged. Follow up as scheduled with Dr. Shirley in .     Frances Gonzales PA-C

## 2024-12-05 LAB — NT PRO BNP: ABNORMAL PG/ML (ref 0–450)

## 2024-12-06 ASSESSMENT — ENCOUNTER SYMPTOMS
SHORTNESS OF BREATH: 1
WHEEZING: 1
COUGH: 0

## 2024-12-17 DIAGNOSIS — I73.9 PAD (PERIPHERAL ARTERY DISEASE) (HCC): ICD-10-CM

## 2024-12-18 RX ORDER — CLOPIDOGREL BISULFATE 75 MG/1
75 TABLET ORAL DAILY
Qty: 90 TABLET | Refills: 3 | Status: SHIPPED | OUTPATIENT
Start: 2024-12-18

## 2024-12-18 NOTE — TELEPHONE ENCOUNTER
Requested Prescriptions     Pending Prescriptions Disp Refills    clopidogrel (PLAVIX) 75 MG tablet [Pharmacy Med Name: CLOPIDOGREL 75MG TABLETS] 90 tablet 3     Sig: TAKE 1 TABLET BY MOUTH DAILY     Rx verified last ov 11/19/24

## 2024-12-26 ENCOUNTER — TELEPHONE (OUTPATIENT)
Dept: INTERNAL MEDICINE CLINIC | Facility: CLINIC | Age: 79
End: 2024-12-26

## 2024-12-26 NOTE — TELEPHONE ENCOUNTER
----- Message from Javiernatalietiffanie FARRUKH sent at 12/26/2024  4:35 PM EST -----  Regarding: ECC Escalation To Practice  ECC Escalation To Practice      Type of Escalation: Red Flag Symptom  --------------------------------------------------------------------------------------------------------------------------    Information for Provider:  Patient is looking for appointment for: Symptom swelling   Reasons for Message: Other practice is  closed    Additional Information patient experiencing a swelling a right leg, but the left leg is bigger than the right leg a bout a week ago.  --------------------------------------------------------------------------------------------------------------------------    Relationship to Patient: Self     Call Back Info: OK to leave message on voicemail  Preferred Call Back Number: Phone [phone]

## 2024-12-27 ENCOUNTER — TELEPHONE (OUTPATIENT)
Dept: INTERNAL MEDICINE CLINIC | Facility: CLINIC | Age: 79
End: 2024-12-27

## 2024-12-27 NOTE — TELEPHONE ENCOUNTER
----- Message from Javiernatalietiffanie FARRUKH sent at 12/26/2024  4:35 PM EST -----  Regarding: ECC Escalation To Practice  ECC Escalation To Practice      Type of Escalation: Red Flag Symptom  --------------------------------------------------------------------------------------------------------------------------    Information for Provider:  Patient is looking for appointment for: Symptom swelling   Reasons for Message: Other practice is  closed    Additional Information patient experiencing a swelling a right leg, but the left leg is bigger than the right leg a bout a week ago.  --------------------------------------------------------------------------------------------------------------------------    Relationship to Patient: Self     Call Back Info: OK to leave message on voicemail  Preferred Call Back Number: Phone [phone]

## 2024-12-27 NOTE — TELEPHONE ENCOUNTER
I called to check on the patient and he states he had no ride, he is waiting on a ride to come take him.  Thank you, Sarah

## 2025-01-01 DIAGNOSIS — D64.9 ANEMIA, UNSPECIFIED TYPE: ICD-10-CM

## 2025-01-01 DIAGNOSIS — K21.9 GASTROESOPHAGEAL REFLUX DISEASE WITHOUT ESOPHAGITIS: ICD-10-CM

## 2025-01-01 DIAGNOSIS — I50.23 ACUTE ON CHRONIC SYSTOLIC (CONGESTIVE) HEART FAILURE (HCC): ICD-10-CM

## 2025-01-01 RX ORDER — ASCORBIC ACID 500 MG
500 TABLET ORAL DAILY
Qty: 30 TABLET | Refills: 10 | Status: CANCELLED | OUTPATIENT
Start: 2025-01-01

## 2025-01-01 RX ORDER — POTASSIUM CHLORIDE 1500 MG/1
20 TABLET, EXTENDED RELEASE ORAL DAILY
Qty: 30 TABLET | Refills: 10 | Status: CANCELLED | OUTPATIENT
Start: 2025-01-01

## 2025-01-01 RX ORDER — PANTOPRAZOLE SODIUM 40 MG/1
40 TABLET, DELAYED RELEASE ORAL
Qty: 30 TABLET | Refills: 10 | Status: CANCELLED | OUTPATIENT
Start: 2025-01-01

## 2025-01-01 RX ORDER — TORSEMIDE 10 MG/1
10 TABLET ORAL DAILY
Qty: 30 TABLET | Refills: 10 | Status: CANCELLED | OUTPATIENT
Start: 2025-01-01

## 2025-01-07 ENCOUNTER — OFFICE VISIT (OUTPATIENT)
Dept: INTERNAL MEDICINE CLINIC | Facility: CLINIC | Age: 80
End: 2025-01-07
Payer: MEDICARE

## 2025-01-07 VITALS
TEMPERATURE: 97.3 F | DIASTOLIC BLOOD PRESSURE: 58 MMHG | SYSTOLIC BLOOD PRESSURE: 90 MMHG | BODY MASS INDEX: 17.95 KG/M2 | HEART RATE: 75 BPM | WEIGHT: 121.2 LBS | OXYGEN SATURATION: 93 % | HEIGHT: 69 IN

## 2025-01-07 DIAGNOSIS — R60.0 BILATERAL LOWER EXTREMITY EDEMA: ICD-10-CM

## 2025-01-07 DIAGNOSIS — I50.23 ACUTE ON CHRONIC SYSTOLIC (CONGESTIVE) HEART FAILURE (HCC): Primary | ICD-10-CM

## 2025-01-07 PROCEDURE — 1125F AMNT PAIN NOTED PAIN PRSNT: CPT | Performed by: NURSE PRACTITIONER

## 2025-01-07 PROCEDURE — 3078F DIAST BP <80 MM HG: CPT | Performed by: NURSE PRACTITIONER

## 2025-01-07 PROCEDURE — 99215 OFFICE O/P EST HI 40 MIN: CPT | Performed by: NURSE PRACTITIONER

## 2025-01-07 PROCEDURE — 1123F ACP DISCUSS/DSCN MKR DOCD: CPT | Performed by: NURSE PRACTITIONER

## 2025-01-07 PROCEDURE — 3074F SYST BP LT 130 MM HG: CPT | Performed by: NURSE PRACTITIONER

## 2025-01-07 PROCEDURE — 1159F MED LIST DOCD IN RCRD: CPT | Performed by: NURSE PRACTITIONER

## 2025-01-07 PROCEDURE — 1160F RVW MEDS BY RX/DR IN RCRD: CPT | Performed by: NURSE PRACTITIONER

## 2025-01-07 RX ORDER — FERROUS SULFATE 325(65) MG
325 TABLET ORAL
Qty: 90 TABLET | Refills: 0 | Status: CANCELLED | OUTPATIENT
Start: 2025-01-07

## 2025-01-07 ASSESSMENT — ENCOUNTER SYMPTOMS
SHORTNESS OF BREATH: 1
WHEEZING: 0
NAUSEA: 0
COUGH: 1
VOMITING: 0
ABDOMINAL PAIN: 0

## 2025-01-07 NOTE — PROGRESS NOTES
[facility]      2025    Patient Name: Beto Shipley  :  1945      Chief Complaint:  Chief Complaint   Patient presents with    Other     Patient has swollen right leg and is \"leaking water\".  Started 2 weeks ago. Painful          HPI  Patient presents today with complaint of bilateral leg swelling and right leg \"leaking water\". Swelling has been present 2-3 months, but the oozing started 1-2 weeks ago. He reports mild discomfort in bilateral legs. No erythema or warmth. Drainage from right leg is clear. He has heart failure and COPD. He reports today that his SOB is at baseline. No CP. Followed by [facility]. Prior cardiologist moved out of town. Has not yet established with new cardiologist.    He was seen in the ER at [facility] on 24 for complaint of increased SOB. CXR consistent with mild pulmonary vascular congestion. Lab work in the ER significant for BNP 26,004. Treated with one-time dose of IV Lasix 40 mg and was discharged home. He was then seen for follow-up in our office on 24. BNP at that time was 30,972. Was instructed to continue torsemide 10 mg daily as prescribed by cardiology.     24    ECHO (TTE) COMPLETE (PRN CONTRAST/BUBBLE/STRAIN/3D) 2024  5:45 PM (Final)    Interpretation Summary    Left Ventricle: Severely reduced left ventricular systolic function with a visually estimated EF of 25 - 30%. Left ventricle is mildly dilated. Normal wall thickness. Severe global hypokinesis present. Indeterminate diastolic function due to mitral valve surgery.    Right Ventricle: Right ventricle is mildly dilated. RV basal diameter is 4.6 cm. RV mid diameter is 3.4 cm. Moderately reduced systolic function.    Aortic Valve: Mild regurgitation. AV PHT is 405.8 millisecond.    Mitral Valve: Valve repaired by annular ring. MV mean gradient is 5 mmHg. Mild regurgitation.    Tricuspid Valve: Mild regurgitation. Severely elevated RVSP, consistent with

## 2025-01-08 NOTE — PROGRESS NOTES
Called and spoke with patient this morning (1/8/25) at 9:20 AM. Discussed with him my concern about need for more diuretics with BP in the 90s systolic range. Advised patient to present to the ER today for further management of acute heart failure. He verbalized understanding and will get someone to give him a ride there.

## 2025-01-09 ENCOUNTER — TELEPHONE (OUTPATIENT)
Dept: INTERNAL MEDICINE CLINIC | Facility: CLINIC | Age: 80
End: 2025-01-09

## 2025-01-09 ENCOUNTER — APPOINTMENT (OUTPATIENT)
Dept: GENERAL RADIOLOGY | Age: 80
DRG: 690 | End: 2025-01-09
Payer: MEDICARE

## 2025-01-09 ENCOUNTER — APPOINTMENT (OUTPATIENT)
Dept: CT IMAGING | Age: 80
DRG: 690 | End: 2025-01-09
Payer: MEDICARE

## 2025-01-09 ENCOUNTER — HOSPITAL ENCOUNTER (INPATIENT)
Age: 80
LOS: 5 days | Discharge: HOME HEALTH | DRG: 690 | End: 2025-01-14
Attending: FAMILY MEDICINE | Admitting: FAMILY MEDICINE
Payer: MEDICARE

## 2025-01-09 DIAGNOSIS — R31.9 URINARY TRACT INFECTION WITH HEMATURIA, SITE UNSPECIFIED: ICD-10-CM

## 2025-01-09 DIAGNOSIS — N39.0 URINARY TRACT INFECTION WITH HEMATURIA, SITE UNSPECIFIED: ICD-10-CM

## 2025-01-09 DIAGNOSIS — R79.89 ELEVATED BRAIN NATRIURETIC PEPTIDE (BNP) LEVEL: ICD-10-CM

## 2025-01-09 DIAGNOSIS — J90 PLEURAL EFFUSION, RIGHT: ICD-10-CM

## 2025-01-09 DIAGNOSIS — R07.9 CHEST PAIN, UNSPECIFIED TYPE: ICD-10-CM

## 2025-01-09 DIAGNOSIS — H61.23 BILATERAL IMPACTED CERUMEN: Primary | ICD-10-CM

## 2025-01-09 DIAGNOSIS — R06.02 SHORTNESS OF BREATH: ICD-10-CM

## 2025-01-09 LAB
ALBUMIN SERPL-MCNC: 2.7 G/DL (ref 3.2–4.6)
ALBUMIN/GLOB SERPL: 0.9 (ref 1–1.9)
ALP SERPL-CCNC: 159 U/L (ref 40–129)
ALT SERPL-CCNC: 30 U/L (ref 8–55)
ANION GAP SERPL CALC-SCNC: 9 MMOL/L (ref 7–16)
AST SERPL-CCNC: 44 U/L (ref 15–37)
BACTERIA URNS QL MICRO: ABNORMAL /HPF
BASOPHILS # BLD: 0.02 K/UL (ref 0–0.2)
BASOPHILS NFR BLD: 0.6 % (ref 0–2)
BILIRUB SERPL-MCNC: 0.4 MG/DL (ref 0–1.2)
BILIRUB UR QL: NEGATIVE
BUN SERPL-MCNC: 37 MG/DL (ref 8–23)
CALCIUM SERPL-MCNC: 8 MG/DL (ref 8.8–10.2)
CHLORIDE SERPL-SCNC: 112 MMOL/L (ref 98–107)
CO2 SERPL-SCNC: 21 MMOL/L (ref 20–29)
CREAT SERPL-MCNC: 1.62 MG/DL (ref 0.8–1.3)
CRP SERPL HS-MCNC: 3.1 MG/L (ref 0–3)
D DIMER PPP FEU-MCNC: 2.45 UG/ML(FEU)
DIFFERENTIAL METHOD BLD: ABNORMAL
EKG ATRIAL RATE: 76 BPM
EKG DIAGNOSIS: NORMAL
EKG Q-T INTERVAL: 406 MS
EKG QRS DURATION: 106 MS
EKG QTC CALCULATION (BAZETT): 459 MS
EKG R AXIS: -47 DEGREES
EKG T AXIS: 69 DEGREES
EKG VENTRICULAR RATE: 77 BPM
EOSINOPHIL # BLD: 0.06 K/UL (ref 0–0.8)
EOSINOPHIL NFR BLD: 1.9 % (ref 0.5–7.8)
EPI CELLS #/AREA URNS HPF: ABNORMAL /HPF
ERYTHROCYTE [DISTWIDTH] IN BLOOD BY AUTOMATED COUNT: 15.2 % (ref 11.9–14.6)
ERYTHROCYTE [SEDIMENTATION RATE] IN BLOOD: 2 MM/HR (ref 0–15)
GLOBULIN SER CALC-MCNC: 2.9 G/DL (ref 2.3–3.5)
GLUCOSE SERPL-MCNC: 183 MG/DL (ref 70–99)
GLUCOSE UR QL STRIP.AUTO: >1000 MG/DL
HCT VFR BLD AUTO: 34.3 % (ref 41.1–50.3)
HGB BLD-MCNC: 10.7 G/DL (ref 13.6–17.2)
IMM GRANULOCYTES # BLD AUTO: 0.01 K/UL (ref 0–0.5)
IMM GRANULOCYTES NFR BLD AUTO: 0.3 % (ref 0–5)
KETONES UR-MCNC: NEGATIVE MG/DL
LEUKOCYTE ESTERASE UR QL STRIP: ABNORMAL
LYMPHOCYTES # BLD: 0.68 K/UL (ref 0.5–4.6)
LYMPHOCYTES NFR BLD: 21.9 % (ref 13–44)
MAGNESIUM SERPL-MCNC: 2.1 MG/DL (ref 1.8–2.4)
MCH RBC QN AUTO: 32 PG (ref 26.1–32.9)
MCHC RBC AUTO-ENTMCNC: 31.2 G/DL (ref 31.4–35)
MCV RBC AUTO: 102.7 FL (ref 82–102)
MONOCYTES # BLD: 0.21 K/UL (ref 0.1–1.3)
MONOCYTES NFR BLD: 6.8 % (ref 4–12)
MUCOUS THREADS URNS QL MICRO: 0 /LPF
NEUTS SEG # BLD: 2.13 K/UL (ref 1.7–8.2)
NEUTS SEG NFR BLD: 68.5 % (ref 43–78)
NITRITE UR QL: POSITIVE
NRBC # BLD: 0 K/UL (ref 0–0.2)
NT PRO BNP: ABNORMAL PG/ML (ref 0–450)
PH UR: 5.5 (ref 5–9)
PLATELET # BLD AUTO: 111 K/UL (ref 150–450)
PMV BLD AUTO: 10.1 FL (ref 9.4–12.3)
POTASSIUM SERPL-SCNC: 4.9 MMOL/L (ref 3.5–5.1)
PROT SERPL-MCNC: 5.6 G/DL (ref 6.3–8.2)
PROT UR QL: ABNORMAL MG/DL
RBC # BLD AUTO: 3.34 M/UL (ref 4.23–5.6)
RBC # UR STRIP: ABNORMAL
RBC #/AREA URNS HPF: ABNORMAL /HPF
SERVICE CMNT-IMP: ABNORMAL
SODIUM SERPL-SCNC: 142 MMOL/L (ref 136–145)
SP GR UR: 1.02 (ref 1–1.02)
TROPONIN T SERPL HS-MCNC: 47 NG/L (ref 0–22)
TROPONIN T SERPL HS-MCNC: 52 NG/L (ref 0–22)
TROPONIN T SERPL HS-MCNC: 54 NG/L (ref 0–22)
UROBILINOGEN UR QL: 0.2 EU/DL (ref 0.2–1)
WBC # BLD AUTO: 3.1 K/UL (ref 4.3–11.1)
WBC URNS QL MICRO: >100 /HPF

## 2025-01-09 PROCEDURE — 6370000000 HC RX 637 (ALT 250 FOR IP): Performed by: PHYSICIAN ASSISTANT

## 2025-01-09 PROCEDURE — 81001 URINALYSIS AUTO W/SCOPE: CPT

## 2025-01-09 PROCEDURE — 80053 COMPREHEN METABOLIC PANEL: CPT

## 2025-01-09 PROCEDURE — 96375 TX/PRO/DX INJ NEW DRUG ADDON: CPT

## 2025-01-09 PROCEDURE — 86141 C-REACTIVE PROTEIN HS: CPT

## 2025-01-09 PROCEDURE — 71046 X-RAY EXAM CHEST 2 VIEWS: CPT

## 2025-01-09 PROCEDURE — 93005 ELECTROCARDIOGRAM TRACING: CPT | Performed by: STUDENT IN AN ORGANIZED HEALTH CARE EDUCATION/TRAINING PROGRAM

## 2025-01-09 PROCEDURE — 6360000002 HC RX W HCPCS: Performed by: PHYSICIAN ASSISTANT

## 2025-01-09 PROCEDURE — 1100000000 HC RM PRIVATE

## 2025-01-09 PROCEDURE — 99285 EMERGENCY DEPT VISIT HI MDM: CPT

## 2025-01-09 PROCEDURE — 71260 CT THORAX DX C+: CPT

## 2025-01-09 PROCEDURE — 85025 COMPLETE CBC W/AUTO DIFF WBC: CPT

## 2025-01-09 PROCEDURE — 96374 THER/PROPH/DIAG INJ IV PUSH: CPT

## 2025-01-09 PROCEDURE — 2500000003 HC RX 250 WO HCPCS: Performed by: PHYSICIAN ASSISTANT

## 2025-01-09 PROCEDURE — 93010 ELECTROCARDIOGRAM REPORT: CPT | Performed by: INTERNAL MEDICINE

## 2025-01-09 PROCEDURE — 87086 URINE CULTURE/COLONY COUNT: CPT

## 2025-01-09 PROCEDURE — 83880 ASSAY OF NATRIURETIC PEPTIDE: CPT

## 2025-01-09 PROCEDURE — 87186 SC STD MICRODIL/AGAR DIL: CPT

## 2025-01-09 PROCEDURE — 85652 RBC SED RATE AUTOMATED: CPT

## 2025-01-09 PROCEDURE — 87088 URINE BACTERIA CULTURE: CPT

## 2025-01-09 PROCEDURE — 85379 FIBRIN DEGRADATION QUANT: CPT

## 2025-01-09 PROCEDURE — 84484 ASSAY OF TROPONIN QUANT: CPT

## 2025-01-09 PROCEDURE — 83735 ASSAY OF MAGNESIUM: CPT

## 2025-01-09 PROCEDURE — 6360000004 HC RX CONTRAST MEDICATION: Performed by: PHYSICIAN ASSISTANT

## 2025-01-09 RX ORDER — PANTOPRAZOLE SODIUM 40 MG/1
40 TABLET, DELAYED RELEASE ORAL
Status: DISCONTINUED | OUTPATIENT
Start: 2025-01-10 | End: 2025-01-14 | Stop reason: HOSPADM

## 2025-01-09 RX ORDER — POLYETHYLENE GLYCOL 3350 17 G/17G
17 POWDER, FOR SOLUTION ORAL DAILY PRN
Status: DISCONTINUED | OUTPATIENT
Start: 2025-01-09 | End: 2025-01-14 | Stop reason: HOSPADM

## 2025-01-09 RX ORDER — FAMOTIDINE 20 MG/1
20 TABLET, FILM COATED ORAL
Status: DISCONTINUED | OUTPATIENT
Start: 2025-01-10 | End: 2025-01-14 | Stop reason: HOSPADM

## 2025-01-09 RX ORDER — TAMSULOSIN HYDROCHLORIDE 0.4 MG/1
0.4 CAPSULE ORAL
Status: DISCONTINUED | OUTPATIENT
Start: 2025-01-10 | End: 2025-01-14 | Stop reason: HOSPADM

## 2025-01-09 RX ORDER — ENOXAPARIN SODIUM 100 MG/ML
30 INJECTION SUBCUTANEOUS DAILY
Status: DISCONTINUED | OUTPATIENT
Start: 2025-01-10 | End: 2025-01-14 | Stop reason: HOSPADM

## 2025-01-09 RX ORDER — HYDROCODONE BITARTRATE AND ACETAMINOPHEN 10; 325 MG/1; MG/1
1 TABLET ORAL EVERY 6 HOURS PRN
Status: DISCONTINUED | OUTPATIENT
Start: 2025-01-09 | End: 2025-01-14 | Stop reason: HOSPADM

## 2025-01-09 RX ORDER — FUROSEMIDE 10 MG/ML
60 INJECTION INTRAMUSCULAR; INTRAVENOUS ONCE
Status: COMPLETED | OUTPATIENT
Start: 2025-01-09 | End: 2025-01-09

## 2025-01-09 RX ORDER — ONDANSETRON 4 MG/1
4 TABLET, ORALLY DISINTEGRATING ORAL EVERY 8 HOURS PRN
Status: DISCONTINUED | OUTPATIENT
Start: 2025-01-09 | End: 2025-01-14 | Stop reason: HOSPADM

## 2025-01-09 RX ORDER — ACETAMINOPHEN 325 MG/1
650 TABLET ORAL EVERY 6 HOURS PRN
Status: DISCONTINUED | OUTPATIENT
Start: 2025-01-09 | End: 2025-01-14 | Stop reason: HOSPADM

## 2025-01-09 RX ORDER — GRANULES FOR ORAL 3 G/1
3 POWDER ORAL ONCE
Status: DISCONTINUED | OUTPATIENT
Start: 2025-01-09 | End: 2025-01-09

## 2025-01-09 RX ORDER — ONDANSETRON 2 MG/ML
4 INJECTION INTRAMUSCULAR; INTRAVENOUS EVERY 6 HOURS PRN
Status: DISCONTINUED | OUTPATIENT
Start: 2025-01-09 | End: 2025-01-14 | Stop reason: HOSPADM

## 2025-01-09 RX ORDER — SODIUM CHLORIDE 0.9 % (FLUSH) 0.9 %
5-40 SYRINGE (ML) INJECTION PRN
Status: DISCONTINUED | OUTPATIENT
Start: 2025-01-09 | End: 2025-01-14 | Stop reason: HOSPADM

## 2025-01-09 RX ORDER — ALBUTEROL SULFATE 0.83 MG/ML
2.5 SOLUTION RESPIRATORY (INHALATION) EVERY 6 HOURS PRN
Status: DISCONTINUED | OUTPATIENT
Start: 2025-01-09 | End: 2025-01-14 | Stop reason: HOSPADM

## 2025-01-09 RX ORDER — CARVEDILOL 25 MG/1
25 TABLET ORAL 2 TIMES DAILY
Status: DISCONTINUED | OUTPATIENT
Start: 2025-01-10 | End: 2025-01-14 | Stop reason: HOSPADM

## 2025-01-09 RX ORDER — BUDESONIDE AND FORMOTEROL FUMARATE DIHYDRATE 80; 4.5 UG/1; UG/1
2 AEROSOL RESPIRATORY (INHALATION)
Status: DISCONTINUED | OUTPATIENT
Start: 2025-01-09 | End: 2025-01-10 | Stop reason: CLARIF

## 2025-01-09 RX ORDER — FLUTICASONE PROPIONATE 50 MCG
2 SPRAY, SUSPENSION (ML) NASAL DAILY PRN
Status: DISCONTINUED | OUTPATIENT
Start: 2025-01-10 | End: 2025-01-14 | Stop reason: HOSPADM

## 2025-01-09 RX ORDER — ATORVASTATIN CALCIUM 40 MG/1
40 TABLET, FILM COATED ORAL DAILY
Status: DISCONTINUED | OUTPATIENT
Start: 2025-01-10 | End: 2025-01-14 | Stop reason: HOSPADM

## 2025-01-09 RX ORDER — DIPHENHYDRAMINE HYDROCHLORIDE 50 MG/ML
25 INJECTION INTRAMUSCULAR; INTRAVENOUS
Status: COMPLETED | OUTPATIENT
Start: 2025-01-09 | End: 2025-01-09

## 2025-01-09 RX ORDER — IPRATROPIUM BROMIDE AND ALBUTEROL SULFATE 2.5; .5 MG/3ML; MG/3ML
1 SOLUTION RESPIRATORY (INHALATION) EVERY 6 HOURS
Status: DISCONTINUED | OUTPATIENT
Start: 2025-01-09 | End: 2025-01-10

## 2025-01-09 RX ORDER — IOPAMIDOL 755 MG/ML
100 INJECTION, SOLUTION INTRAVASCULAR
Status: COMPLETED | OUTPATIENT
Start: 2025-01-09 | End: 2025-01-09

## 2025-01-09 RX ORDER — SODIUM CHLORIDE 9 MG/ML
INJECTION, SOLUTION INTRAVENOUS PRN
Status: DISCONTINUED | OUTPATIENT
Start: 2025-01-09 | End: 2025-01-14 | Stop reason: HOSPADM

## 2025-01-09 RX ORDER — ACETAMINOPHEN 650 MG/1
650 SUPPOSITORY RECTAL EVERY 6 HOURS PRN
Status: DISCONTINUED | OUTPATIENT
Start: 2025-01-09 | End: 2025-01-14 | Stop reason: HOSPADM

## 2025-01-09 RX ORDER — SODIUM CHLORIDE 0.9 % (FLUSH) 0.9 %
5-40 SYRINGE (ML) INJECTION EVERY 12 HOURS SCHEDULED
Status: DISCONTINUED | OUTPATIENT
Start: 2025-01-09 | End: 2025-01-14 | Stop reason: HOSPADM

## 2025-01-09 RX ORDER — CLOPIDOGREL BISULFATE 75 MG/1
75 TABLET ORAL DAILY
Status: DISCONTINUED | OUTPATIENT
Start: 2025-01-10 | End: 2025-01-14 | Stop reason: HOSPADM

## 2025-01-09 RX ORDER — FERROUS SULFATE 325(65) MG
325 TABLET ORAL
Status: DISCONTINUED | OUTPATIENT
Start: 2025-01-10 | End: 2025-01-14 | Stop reason: HOSPADM

## 2025-01-09 RX ORDER — FUROSEMIDE 10 MG/ML
40 INJECTION INTRAMUSCULAR; INTRAVENOUS DAILY
Status: DISCONTINUED | OUTPATIENT
Start: 2025-01-10 | End: 2025-01-13

## 2025-01-09 RX ORDER — ASPIRIN 81 MG/1
81 TABLET, CHEWABLE ORAL DAILY
Status: DISCONTINUED | OUTPATIENT
Start: 2025-01-10 | End: 2025-01-14 | Stop reason: HOSPADM

## 2025-01-09 RX ORDER — TORSEMIDE 20 MG/1
10 TABLET ORAL DAILY
Status: DISCONTINUED | OUTPATIENT
Start: 2025-01-10 | End: 2025-01-14 | Stop reason: HOSPADM

## 2025-01-09 RX ADMIN — IOPAMIDOL 100 ML: 755 INJECTION, SOLUTION INTRAVENOUS at 19:31

## 2025-01-09 RX ADMIN — DIPHENHYDRAMINE HYDROCHLORIDE 25 MG: 50 INJECTION INTRAMUSCULAR; INTRAVENOUS at 18:52

## 2025-01-09 RX ADMIN — WATER 125 MG: 1 INJECTION INTRAMUSCULAR; INTRAVENOUS; SUBCUTANEOUS at 18:54

## 2025-01-09 RX ADMIN — FUROSEMIDE 60 MG: 10 INJECTION, SOLUTION INTRAMUSCULAR; INTRAVENOUS at 18:51

## 2025-01-09 RX ADMIN — CARBAMIDE PEROXIDE 6.5% 5 DROP: 6.5 LIQUID AURICULAR (OTIC) at 20:06

## 2025-01-09 RX ADMIN — MEROPENEM 1000 MG: 1 INJECTION INTRAVENOUS at 22:17

## 2025-01-09 ASSESSMENT — PAIN DESCRIPTION - FREQUENCY: FREQUENCY: INTERMITTENT

## 2025-01-09 ASSESSMENT — PAIN DESCRIPTION - ORIENTATION: ORIENTATION: RIGHT;LEFT

## 2025-01-09 ASSESSMENT — PAIN SCALES - GENERAL
PAINLEVEL_OUTOF10: 0
PAINLEVEL_OUTOF10: 8

## 2025-01-09 ASSESSMENT — PAIN DESCRIPTION - LOCATION: LOCATION: LEG

## 2025-01-09 ASSESSMENT — PAIN - FUNCTIONAL ASSESSMENT: PAIN_FUNCTIONAL_ASSESSMENT: 0-10

## 2025-01-09 ASSESSMENT — PAIN DESCRIPTION - ONSET: ONSET: SUDDEN

## 2025-01-09 ASSESSMENT — PAIN DESCRIPTION - PAIN TYPE: TYPE: ACUTE PAIN

## 2025-01-09 NOTE — TELEPHONE ENCOUNTER
Please call patient to follow-up. I spoke with him yesterday morning asking him to present to the ER for assistance in management of acute heart failure given very elevated BNP level, along with lower leg swelling. I cannot tell that he went. Please follow-up.

## 2025-01-09 NOTE — TELEPHONE ENCOUNTER
I called patient and spoke to the patient  at 9:30am.  He stated his niece Maryjane will bring him to ER today.    Per Anne Marie  Please call patient to follow-up. I spoke with him yesterday morning asking him to present to the ER for assistance in management of acute heart failure given very elevated BNP level, along with lower leg swelling. I cannot tell that he went. Please follow-up.          Patient voiced his understanding.

## 2025-01-09 NOTE — TELEPHONE ENCOUNTER
Pt called back wanted to know what he needed to do. Expressed that he needed to get over to the ER as stated when SWAPNIL blankenship called at 9:30. Pt voiced his understanding and would wait on his niece to take him

## 2025-01-09 NOTE — ED PROVIDER NOTES
Emergency Department Provider Note       PCP: Alma Delia Shirley MD   Age: 79 y.o.   Sex: male     DISPOSITION Decision To Admit 01/09/2025 10:02:46 PM   DISPOSITION CONDITION Stable            ICD-10-CM    1. Bilateral impacted cerumen  H61.23 [facility] - Bell Bowles DO, Otolaryngology, [place]      2. Urinary tract infection with hematuria, site unspecified  N39.0     R31.9       3. Chest pain, unspecified type  R07.9       4. Elevated brain natriuretic peptide (BNP) level  R79.89       5. Shortness of breath  R06.02       6. Pleural effusion, right  J90           Medical Decision Making     6:34 PM Spoke with Dr. Breen regarding patient, he will go and see him. D-dimer elevated with shortness of breath. Rash to iodine. Dr. Breen would like me to pretreat with benadryl and solumedrol IV and get the CT scan for PE.  ED Course as of 01/09/25 2202   Thu Jan 09, 2025   1619 EKG interpretation: Accelerated junctional rhythm, leftward axis, no obvious ischemia [BR]   2115 EKG interpretation: Sinus rhythm, rate of 85, leftward axis, no obvious ischemic change, consistent with prior trace [BR]      ED Course User Index  [BR] Alexy Breen DO   9:10 PM patient states he is having left-sided chest pain.  EKG repeated and essentially unchanged.  Repeat troponin was done.  Patient with UTI and history of ESBL.  Meropenem IV was ordered.  Patient does not feel like he can go home with the shortness of breath that he has had.  Ambulatory sat was ordered.  Will contact hospitalist for admission.  Dr. Breen informed.  10:00 PM Perfect serve message sent to Dr. Heavenly Peters to admit patient. She will admit.   1 or more acute illnesses that pose a threat to life or bodily function.   Discussion with external consultants.    I independently ordered and reviewed each unique test.  I reviewed external records: ED visit note from a different ED.    The patients assessment required an independent

## 2025-01-09 NOTE — ED TRIAGE NOTES
Pt arrives POV A&Ox4 in wheelchair w/family. Pt reports bilateral leg swelling w/intermittent SOB/cough. Pt told by PCP to be seen.

## 2025-01-10 LAB
ANION GAP SERPL CALC-SCNC: 11 MMOL/L (ref 7–16)
BASOPHILS # BLD: 0.01 K/UL (ref 0–0.2)
BASOPHILS NFR BLD: 0.3 % (ref 0–2)
BUN SERPL-MCNC: 37 MG/DL (ref 8–23)
CALCIUM SERPL-MCNC: 8.1 MG/DL (ref 8.8–10.2)
CHLORIDE SERPL-SCNC: 108 MMOL/L (ref 98–107)
CO2 SERPL-SCNC: 21 MMOL/L (ref 20–29)
CREAT SERPL-MCNC: 1.71 MG/DL (ref 0.8–1.3)
DIFFERENTIAL METHOD BLD: ABNORMAL
EOSINOPHIL # BLD: 0 K/UL (ref 0–0.8)
EOSINOPHIL NFR BLD: 0 % (ref 0.5–7.8)
ERYTHROCYTE [DISTWIDTH] IN BLOOD BY AUTOMATED COUNT: 15 % (ref 11.9–14.6)
GLUCOSE BLD STRIP.AUTO-MCNC: 198 MG/DL (ref 65–100)
GLUCOSE BLD STRIP.AUTO-MCNC: 374 MG/DL (ref 65–100)
GLUCOSE BLD STRIP.AUTO-MCNC: 386 MG/DL (ref 65–100)
GLUCOSE BLD STRIP.AUTO-MCNC: 400 MG/DL (ref 65–100)
GLUCOSE SERPL-MCNC: 376 MG/DL (ref 70–99)
HCT VFR BLD AUTO: 33 % (ref 41.1–50.3)
HGB BLD-MCNC: 10.7 G/DL (ref 13.6–17.2)
IMM GRANULOCYTES # BLD AUTO: 0.02 K/UL (ref 0–0.5)
IMM GRANULOCYTES NFR BLD AUTO: 0.7 % (ref 0–5)
LYMPHOCYTES # BLD: 0.28 K/UL (ref 0.5–4.6)
LYMPHOCYTES NFR BLD: 9.5 % (ref 13–44)
MCH RBC QN AUTO: 32.6 PG (ref 26.1–32.9)
MCHC RBC AUTO-ENTMCNC: 32.4 G/DL (ref 31.4–35)
MCV RBC AUTO: 100.6 FL (ref 82–102)
MONOCYTES # BLD: 0.04 K/UL (ref 0.1–1.3)
MONOCYTES NFR BLD: 1.4 % (ref 4–12)
NEUTS SEG # BLD: 2.59 K/UL (ref 1.7–8.2)
NEUTS SEG NFR BLD: 88.1 % (ref 43–78)
NRBC # BLD: 0 K/UL (ref 0–0.2)
PLATELET # BLD AUTO: 109 K/UL (ref 150–450)
PMV BLD AUTO: 10.5 FL (ref 9.4–12.3)
POTASSIUM SERPL-SCNC: 4.4 MMOL/L (ref 3.5–5.1)
RBC # BLD AUTO: 3.28 M/UL (ref 4.23–5.6)
SERVICE CMNT-IMP: ABNORMAL
SODIUM SERPL-SCNC: 140 MMOL/L (ref 136–145)
WBC # BLD AUTO: 3 K/UL (ref 4.3–11.1)

## 2025-01-10 PROCEDURE — 6370000000 HC RX 637 (ALT 250 FOR IP): Performed by: FAMILY MEDICINE

## 2025-01-10 PROCEDURE — 36415 COLL VENOUS BLD VENIPUNCTURE: CPT

## 2025-01-10 PROCEDURE — 2500000003 HC RX 250 WO HCPCS: Performed by: FAMILY MEDICINE

## 2025-01-10 PROCEDURE — 97165 OT EVAL LOW COMPLEX 30 MIN: CPT

## 2025-01-10 PROCEDURE — 80048 BASIC METABOLIC PNL TOTAL CA: CPT

## 2025-01-10 PROCEDURE — 2580000003 HC RX 258: Performed by: FAMILY MEDICINE

## 2025-01-10 PROCEDURE — 6370000000 HC RX 637 (ALT 250 FOR IP)

## 2025-01-10 PROCEDURE — 94760 N-INVAS EAR/PLS OXIMETRY 1: CPT

## 2025-01-10 PROCEDURE — 85025 COMPLETE CBC W/AUTO DIFF WBC: CPT

## 2025-01-10 PROCEDURE — 97535 SELF CARE MNGMENT TRAINING: CPT

## 2025-01-10 PROCEDURE — 94664 DEMO&/EVAL PT USE INHALER: CPT

## 2025-01-10 PROCEDURE — 6360000002 HC RX W HCPCS: Performed by: FAMILY MEDICINE

## 2025-01-10 PROCEDURE — 97161 PT EVAL LOW COMPLEX 20 MIN: CPT

## 2025-01-10 PROCEDURE — 94640 AIRWAY INHALATION TREATMENT: CPT

## 2025-01-10 PROCEDURE — 1100000000 HC RM PRIVATE

## 2025-01-10 PROCEDURE — 99222 1ST HOSP IP/OBS MODERATE 55: CPT | Performed by: INTERNAL MEDICINE

## 2025-01-10 PROCEDURE — 94761 N-INVAS EAR/PLS OXIMETRY MLT: CPT

## 2025-01-10 PROCEDURE — 82962 GLUCOSE BLOOD TEST: CPT

## 2025-01-10 RX ORDER — BUDESONIDE 0.25 MG/2ML
0.25 INHALANT ORAL
Status: DISCONTINUED | OUTPATIENT
Start: 2025-01-10 | End: 2025-01-14 | Stop reason: HOSPADM

## 2025-01-10 RX ORDER — INSULIN LISPRO 100 [IU]/ML
0-8 INJECTION, SOLUTION INTRAVENOUS; SUBCUTANEOUS
Status: DISCONTINUED | OUTPATIENT
Start: 2025-01-10 | End: 2025-01-10

## 2025-01-10 RX ORDER — IPRATROPIUM BROMIDE AND ALBUTEROL SULFATE 2.5; .5 MG/3ML; MG/3ML
1 SOLUTION RESPIRATORY (INHALATION)
Status: DISCONTINUED | OUTPATIENT
Start: 2025-01-10 | End: 2025-01-13

## 2025-01-10 RX ORDER — INSULIN GLARGINE 100 [IU]/ML
10 INJECTION, SOLUTION SUBCUTANEOUS DAILY
Status: DISCONTINUED | OUTPATIENT
Start: 2025-01-10 | End: 2025-01-10

## 2025-01-10 RX ORDER — IBUPROFEN 600 MG/1
1 TABLET ORAL PRN
Status: DISCONTINUED | OUTPATIENT
Start: 2025-01-10 | End: 2025-01-14 | Stop reason: HOSPADM

## 2025-01-10 RX ORDER — INSULIN GLARGINE 100 [IU]/ML
10 INJECTION, SOLUTION SUBCUTANEOUS 2 TIMES DAILY
Status: DISCONTINUED | OUTPATIENT
Start: 2025-01-10 | End: 2025-01-11

## 2025-01-10 RX ORDER — ARFORMOTEROL TARTRATE 15 UG/2ML
15 SOLUTION RESPIRATORY (INHALATION)
Status: DISCONTINUED | OUTPATIENT
Start: 2025-01-10 | End: 2025-01-14 | Stop reason: HOSPADM

## 2025-01-10 RX ORDER — POLYETHYLENE GLYCOL 3350 17 G/17G
17 POWDER, FOR SOLUTION ORAL DAILY
Status: DISCONTINUED | OUTPATIENT
Start: 2025-01-10 | End: 2025-01-14 | Stop reason: HOSPADM

## 2025-01-10 RX ORDER — INSULIN LISPRO 100 [IU]/ML
0-16 INJECTION, SOLUTION INTRAVENOUS; SUBCUTANEOUS
Status: DISCONTINUED | OUTPATIENT
Start: 2025-01-10 | End: 2025-01-11

## 2025-01-10 RX ORDER — INSULIN LISPRO 100 [IU]/ML
6 INJECTION, SOLUTION INTRAVENOUS; SUBCUTANEOUS
Status: DISCONTINUED | OUTPATIENT
Start: 2025-01-10 | End: 2025-01-11

## 2025-01-10 RX ORDER — DEXTROSE MONOHYDRATE 100 MG/ML
INJECTION, SOLUTION INTRAVENOUS CONTINUOUS PRN
Status: DISCONTINUED | OUTPATIENT
Start: 2025-01-10 | End: 2025-01-14 | Stop reason: HOSPADM

## 2025-01-10 RX ADMIN — MEROPENEM 1000 MG: 1 INJECTION INTRAVENOUS at 09:26

## 2025-01-10 RX ADMIN — SODIUM CHLORIDE, PRESERVATIVE FREE 10 ML: 5 INJECTION INTRAVENOUS at 08:03

## 2025-01-10 RX ADMIN — ARFORMOTEROL TARTRATE 15 MCG: 15 SOLUTION RESPIRATORY (INHALATION) at 21:06

## 2025-01-10 RX ADMIN — IPRATROPIUM BROMIDE AND ALBUTEROL SULFATE 1 DOSE: 2.5; .5 SOLUTION RESPIRATORY (INHALATION) at 08:27

## 2025-01-10 RX ADMIN — SACUBITRIL AND VALSARTAN 1 TABLET: 24; 26 TABLET, FILM COATED ORAL at 02:02

## 2025-01-10 RX ADMIN — FERROUS SULFATE TAB 325 MG (65 MG ELEMENTAL FE) 325 MG: 325 (65 FE) TAB at 08:01

## 2025-01-10 RX ADMIN — CARVEDILOL 25 MG: 25 TABLET, FILM COATED ORAL at 02:02

## 2025-01-10 RX ADMIN — MEROPENEM 1000 MG: 1 INJECTION INTRAVENOUS at 22:03

## 2025-01-10 RX ADMIN — ASPIRIN 81 MG: 81 TABLET, CHEWABLE ORAL at 08:01

## 2025-01-10 RX ADMIN — FAMOTIDINE 20 MG: 20 TABLET, FILM COATED ORAL at 20:49

## 2025-01-10 RX ADMIN — INSULIN GLARGINE 10 UNITS: 100 INJECTION, SOLUTION SUBCUTANEOUS at 20:52

## 2025-01-10 RX ADMIN — HYDROCODONE BITARTRATE AND ACETAMINOPHEN 1 TABLET: 10; 325 TABLET ORAL at 20:49

## 2025-01-10 RX ADMIN — POLYETHYLENE GLYCOL 3350 17 G: 17 POWDER, FOR SOLUTION ORAL at 09:29

## 2025-01-10 RX ADMIN — BUDESONIDE 250 MCG: 0.25 SUSPENSION RESPIRATORY (INHALATION) at 08:27

## 2025-01-10 RX ADMIN — FUROSEMIDE 40 MG: 10 INJECTION, SOLUTION INTRAMUSCULAR; INTRAVENOUS at 08:12

## 2025-01-10 RX ADMIN — TAMSULOSIN HYDROCHLORIDE 0.4 MG: 0.4 CAPSULE ORAL at 20:49

## 2025-01-10 RX ADMIN — CARVEDILOL 25 MG: 25 TABLET, FILM COATED ORAL at 20:49

## 2025-01-10 RX ADMIN — FAMOTIDINE 20 MG: 20 TABLET, FILM COATED ORAL at 02:02

## 2025-01-10 RX ADMIN — CLOPIDOGREL BISULFATE 75 MG: 75 TABLET ORAL at 08:01

## 2025-01-10 RX ADMIN — INSULIN GLARGINE 10 UNITS: 100 INJECTION, SOLUTION SUBCUTANEOUS at 09:38

## 2025-01-10 RX ADMIN — ENOXAPARIN SODIUM 30 MG: 100 INJECTION SUBCUTANEOUS at 08:03

## 2025-01-10 RX ADMIN — INSULIN LISPRO 4 UNITS: 100 INJECTION, SOLUTION INTRAVENOUS; SUBCUTANEOUS at 20:53

## 2025-01-10 RX ADMIN — EMPAGLIFLOZIN 10 MG: 10 TABLET, FILM COATED ORAL at 08:01

## 2025-01-10 RX ADMIN — SODIUM CHLORIDE, PRESERVATIVE FREE 10 ML: 5 INJECTION INTRAVENOUS at 02:05

## 2025-01-10 RX ADMIN — SODIUM CHLORIDE, PRESERVATIVE FREE 10 ML: 5 INJECTION INTRAVENOUS at 20:51

## 2025-01-10 RX ADMIN — ATORVASTATIN CALCIUM 40 MG: 40 TABLET, FILM COATED ORAL at 08:01

## 2025-01-10 RX ADMIN — INSULIN LISPRO 16 UNITS: 100 INJECTION, SOLUTION INTRAVENOUS; SUBCUTANEOUS at 15:59

## 2025-01-10 RX ADMIN — SACUBITRIL AND VALSARTAN 1 TABLET: 24; 26 TABLET, FILM COATED ORAL at 08:01

## 2025-01-10 RX ADMIN — INSULIN LISPRO 6 UNITS: 100 INJECTION, SOLUTION INTRAVENOUS; SUBCUTANEOUS at 15:58

## 2025-01-10 RX ADMIN — BUDESONIDE 250 MCG: 0.25 SUSPENSION RESPIRATORY (INHALATION) at 21:06

## 2025-01-10 RX ADMIN — INSULIN LISPRO 8 UNITS: 100 INJECTION, SOLUTION INTRAVENOUS; SUBCUTANEOUS at 09:34

## 2025-01-10 RX ADMIN — IPRATROPIUM BROMIDE AND ALBUTEROL SULFATE 1 DOSE: 2.5; .5 SOLUTION RESPIRATORY (INHALATION) at 13:33

## 2025-01-10 RX ADMIN — SACUBITRIL AND VALSARTAN 1 TABLET: 24; 26 TABLET, FILM COATED ORAL at 20:49

## 2025-01-10 RX ADMIN — CARVEDILOL 25 MG: 25 TABLET, FILM COATED ORAL at 08:13

## 2025-01-10 RX ADMIN — IPRATROPIUM BROMIDE AND ALBUTEROL SULFATE 1 DOSE: 2.5; .5 SOLUTION RESPIRATORY (INHALATION) at 21:06

## 2025-01-10 RX ADMIN — TAMSULOSIN HYDROCHLORIDE 0.4 MG: 0.4 CAPSULE ORAL at 02:02

## 2025-01-10 RX ADMIN — ARFORMOTEROL TARTRATE 15 MCG: 15 SOLUTION RESPIRATORY (INHALATION) at 08:27

## 2025-01-10 ASSESSMENT — PAIN SCALES - GENERAL
PAINLEVEL_OUTOF10: 0
PAINLEVEL_OUTOF10: 6

## 2025-01-10 ASSESSMENT — PAIN DESCRIPTION - DESCRIPTORS: DESCRIPTORS: THROBBING

## 2025-01-10 ASSESSMENT — PAIN DESCRIPTION - LOCATION: LOCATION: LEG

## 2025-01-10 ASSESSMENT — PAIN DESCRIPTION - ORIENTATION: ORIENTATION: RIGHT

## 2025-01-10 ASSESSMENT — PAIN - FUNCTIONAL ASSESSMENT: PAIN_FUNCTIONAL_ASSESSMENT: PREVENTS OR INTERFERES SOME ACTIVE ACTIVITIES AND ADLS

## 2025-01-10 NOTE — ASSESSMENT & PLAN NOTE
- H/O ESBL-producing UTI previously  - Started on meropenem in ER, will continue  - Have added urine culture   - Monitor daily CBC

## 2025-01-10 NOTE — H&P
Cotton in ear s/p irrigation and ear drops  Neck:  No restricted ROM.  Trachea midline  CV:   Rate-controlled.  S1/S2 auscultated  Lungs:   Diminished.  Faint crackles in bases, worse on R  Abdomen:   Bowel sounds present.  Soft, nontender, nondistended.    Extremities: Warm and dry.  No cyanosis or clubbing.  1-2+ BLE edema.    Skin:     No rashes.  Normal turgor.  Normal coloration  Neuro:  Cranial nerves II-XII grossly intact.   Sensation intact  Psych:  Normal mood and affect.  Alert and oriented     Data Ordered and Personally Reviewed:    Last 24hr Labs:  Recent Results (from the past 24 hour(s))   EKG 12 Lead    Collection Time: 01/09/25  4:07 PM   Result Value Ref Range    Ventricular Rate 77 BPM    Atrial Rate 76 BPM    QRS Duration 106 ms    Q-T Interval 406 ms    QTc Calculation (Bazett) 459 ms    R Axis -47 degrees    T Axis 69 degrees    Diagnosis       Sinus rhythm  Left axis deviation  Anterior infarct , age undetermined  Left atrial enlargement  When compared with ECG of 14-MAY-2024 10:13,  No significant change was found  Confirmed by Zaheer Teixeira MD ([id number]) on 1/9/2025 5:38:06 PM     CBC with Auto Differential    Collection Time: 01/09/25  4:16 PM   Result Value Ref Range    WBC 3.1 (L) 4.3 - 11.1 K/uL    RBC 3.34 (L) 4.23 - 5.6 M/uL    Hemoglobin 10.7 (L) 13.6 - 17.2 g/dL    Hematocrit 34.3 (L) 41.1 - 50.3 %    .7 (H) 82.0 - 102.0 FL    MCH 32.0 26.1 - 32.9 PG    MCHC 31.2 (L) 31.4 - 35.0 g/dL    RDW 15.2 (H) 11.9 - 14.6 %    Platelets 111 (L) 150 - 450 K/uL    MPV 10.1 9.4 - 12.3 FL    nRBC 0.00 0.0 - 0.2 K/uL    Differential Type AUTOMATED      Neutrophils % 68.5 43.0 - 78.0 %    Lymphocytes % 21.9 13.0 - 44.0 %    Monocytes % 6.8 4.0 - 12.0 %    Eosinophils % 1.9 0.5 - 7.8 %    Basophils % 0.6 0.0 - 2.0 %    Immature Granulocytes % 0.3 0.0 - 5.0 %    Neutrophils Absolute 2.13 1.70 - 8.20 K/UL    Lymphocytes Absolute 0.68 0.50 - 4.60 K/UL    Monocytes Absolute 0.21 0.10 - 1.30 K/UL

## 2025-01-10 NOTE — ASSESSMENT & PLAN NOTE
- Small pleural effusion on CXR and some BLE edema.  However not currently dyspneic and not hypoxic even with ambulation  - Continue home meds.  Transition home torsemide to IV lasix while hospitalized

## 2025-01-10 NOTE — ACP (ADVANCE CARE PLANNING)
Advance Care Planning   General Advance Care Planning (ACP) Conversation    Date of Conversation: 1/9/2025  Conducted with: Patient with Decision Making Capacity  Other persons present: None    Healthcare Decision Maker:    Primary Decision Maker: NawafAneudy - Brother/Sister - [phone]    Today we documented Decision Maker(s) consistent with Legal Next of Kin hierarchy.  Content/Action Overview:  DECLINED ACP Conversation - will revisit periodically    Length of Voluntary ACP Conversation in minutes:  <16 minutes (Non-Billable)    Rashid West RN

## 2025-01-10 NOTE — PLAN OF CARE
Problem: Chronic Conditions and Co-morbidities  Goal: Patient's chronic conditions and co-morbidity symptoms are monitored and maintained or improved  1/10/2025 1214 by Shonna Humphrey RN  Outcome: Progressing  1/9/2025 2314 by Charlee Trevino, RN  Outcome: Progressing     Problem: Discharge Planning  Goal: Discharge to home or other facility with appropriate resources  1/10/2025 1214 by Shonna Humphrey, RN  Outcome: Progressing  1/9/2025 2314 by Charlee Trevino, RN  Outcome: Progressing     Problem: Pain  Goal: Verbalizes/displays adequate comfort level or baseline comfort level  1/10/2025 1214 by Shonna Humphrey RN  Outcome: Progressing  1/9/2025 2314 by Charlee Trevino, RN  Outcome: Progressing

## 2025-01-10 NOTE — ED NOTES
TRANSFER - OUT REPORT:    Verbal report given to RONNY Deshpande on Beto Shipley  being transferred to [facility] for urgent transfer       Report consisted of patient's Situation, Background, Assessment and   Recommendations(SBAR).     Information from the following report(s) Nurse Handoff Report, ED Encounter Summary, ED SBAR, MAR, and Recent Results was reviewed with the receiving nurse.    Vernon Fall Assessment:                           Lines:   Peripheral IV 01/09/25 Left;Anterior Forearm (Active)        Opportunity for questions and clarification was provided.      Patient transported with:  Registered Nurse

## 2025-01-10 NOTE — ED NOTES
This RN ambulated pt at this time to check O2 levels. Pt O2 did not drop below 95% the entire time.

## 2025-01-11 LAB
ANION GAP SERPL CALC-SCNC: 11 MMOL/L (ref 7–16)
BASOPHILS # BLD: 0.01 K/UL (ref 0–0.2)
BASOPHILS NFR BLD: 0.2 % (ref 0–2)
BUN SERPL-MCNC: 36 MG/DL (ref 8–23)
CALCIUM SERPL-MCNC: 8.1 MG/DL (ref 8.8–10.2)
CHLORIDE SERPL-SCNC: 108 MMOL/L (ref 98–107)
CO2 SERPL-SCNC: 22 MMOL/L (ref 20–29)
CREAT SERPL-MCNC: 1.66 MG/DL (ref 0.8–1.3)
DIFFERENTIAL METHOD BLD: ABNORMAL
EOSINOPHIL # BLD: 0.01 K/UL (ref 0–0.8)
EOSINOPHIL NFR BLD: 0.2 % (ref 0.5–7.8)
ERYTHROCYTE [DISTWIDTH] IN BLOOD BY AUTOMATED COUNT: 15.2 % (ref 11.9–14.6)
EST. AVERAGE GLUCOSE BLD GHB EST-MCNC: 164 MG/DL
FOLATE SERPL-MCNC: 23 NG/ML (ref 3.1–17.5)
GLUCOSE BLD STRIP.AUTO-MCNC: 115 MG/DL (ref 65–100)
GLUCOSE BLD STRIP.AUTO-MCNC: 127 MG/DL (ref 65–100)
GLUCOSE BLD STRIP.AUTO-MCNC: 136 MG/DL (ref 65–100)
GLUCOSE BLD STRIP.AUTO-MCNC: 151 MG/DL (ref 65–100)
GLUCOSE BLD STRIP.AUTO-MCNC: 200 MG/DL (ref 65–100)
GLUCOSE BLD STRIP.AUTO-MCNC: 263 MG/DL (ref 65–100)
GLUCOSE BLD STRIP.AUTO-MCNC: 37 MG/DL (ref 65–100)
GLUCOSE BLD STRIP.AUTO-MCNC: 45 MG/DL (ref 65–100)
GLUCOSE SERPL-MCNC: 107 MG/DL (ref 70–99)
HBA1C MFR BLD: 7.4 % (ref 0–5.6)
HCT VFR BLD AUTO: 32.5 % (ref 41.1–50.3)
HGB BLD-MCNC: 10.4 G/DL (ref 13.6–17.2)
IMM GRANULOCYTES # BLD AUTO: 0.02 K/UL (ref 0–0.5)
IMM GRANULOCYTES NFR BLD AUTO: 0.3 % (ref 0–5)
LYMPHOCYTES # BLD: 0.38 K/UL (ref 0.5–4.6)
LYMPHOCYTES NFR BLD: 6.1 % (ref 13–44)
MCH RBC QN AUTO: 32.4 PG (ref 26.1–32.9)
MCHC RBC AUTO-ENTMCNC: 32 G/DL (ref 31.4–35)
MCV RBC AUTO: 101.2 FL (ref 82–102)
MONOCYTES # BLD: 0.39 K/UL (ref 0.1–1.3)
MONOCYTES NFR BLD: 6.2 % (ref 4–12)
NEUTS SEG # BLD: 5.46 K/UL (ref 1.7–8.2)
NEUTS SEG NFR BLD: 87 % (ref 43–78)
NRBC # BLD: 0 K/UL (ref 0–0.2)
PLATELET # BLD AUTO: 115 K/UL (ref 150–450)
PMV BLD AUTO: 10.2 FL (ref 9.4–12.3)
POTASSIUM SERPL-SCNC: 4 MMOL/L (ref 3.5–5.1)
RBC # BLD AUTO: 3.21 M/UL (ref 4.23–5.6)
SERVICE CMNT-IMP: ABNORMAL
SODIUM SERPL-SCNC: 141 MMOL/L (ref 136–145)
VIT B12 SERPL-MCNC: 701 PG/ML (ref 193–986)
WBC # BLD AUTO: 6.3 K/UL (ref 4.3–11.1)

## 2025-01-11 PROCEDURE — 85025 COMPLETE CBC W/AUTO DIFF WBC: CPT

## 2025-01-11 PROCEDURE — 6360000002 HC RX W HCPCS: Performed by: FAMILY MEDICINE

## 2025-01-11 PROCEDURE — 83036 HEMOGLOBIN GLYCOSYLATED A1C: CPT

## 2025-01-11 PROCEDURE — 1100000000 HC RM PRIVATE

## 2025-01-11 PROCEDURE — 6370000000 HC RX 637 (ALT 250 FOR IP): Performed by: FAMILY MEDICINE

## 2025-01-11 PROCEDURE — 2580000003 HC RX 258: Performed by: FAMILY MEDICINE

## 2025-01-11 PROCEDURE — 94640 AIRWAY INHALATION TREATMENT: CPT

## 2025-01-11 PROCEDURE — 6370000000 HC RX 637 (ALT 250 FOR IP)

## 2025-01-11 PROCEDURE — 2580000003 HC RX 258

## 2025-01-11 PROCEDURE — 2500000003 HC RX 250 WO HCPCS: Performed by: FAMILY MEDICINE

## 2025-01-11 PROCEDURE — 94760 N-INVAS EAR/PLS OXIMETRY 1: CPT

## 2025-01-11 PROCEDURE — 82607 VITAMIN B-12: CPT

## 2025-01-11 PROCEDURE — 82962 GLUCOSE BLOOD TEST: CPT

## 2025-01-11 PROCEDURE — 36415 COLL VENOUS BLD VENIPUNCTURE: CPT

## 2025-01-11 PROCEDURE — 82746 ASSAY OF FOLIC ACID SERUM: CPT

## 2025-01-11 PROCEDURE — 80048 BASIC METABOLIC PNL TOTAL CA: CPT

## 2025-01-11 RX ORDER — INSULIN LISPRO 100 [IU]/ML
0-8 INJECTION, SOLUTION INTRAVENOUS; SUBCUTANEOUS
Status: DISCONTINUED | OUTPATIENT
Start: 2025-01-11 | End: 2025-01-14 | Stop reason: HOSPADM

## 2025-01-11 RX ORDER — INSULIN GLARGINE 100 [IU]/ML
10 INJECTION, SOLUTION SUBCUTANEOUS NIGHTLY
Status: DISCONTINUED | OUTPATIENT
Start: 2025-01-11 | End: 2025-01-12

## 2025-01-11 RX ADMIN — CARVEDILOL 25 MG: 25 TABLET, FILM COATED ORAL at 21:15

## 2025-01-11 RX ADMIN — ASPIRIN 81 MG: 81 TABLET, CHEWABLE ORAL at 08:57

## 2025-01-11 RX ADMIN — INSULIN LISPRO 4 UNITS: 100 INJECTION, SOLUTION INTRAVENOUS; SUBCUTANEOUS at 21:17

## 2025-01-11 RX ADMIN — CARVEDILOL 25 MG: 25 TABLET, FILM COATED ORAL at 08:57

## 2025-01-11 RX ADMIN — SODIUM CHLORIDE, PRESERVATIVE FREE 10 ML: 5 INJECTION INTRAVENOUS at 21:16

## 2025-01-11 RX ADMIN — EMPAGLIFLOZIN 10 MG: 10 TABLET, FILM COATED ORAL at 08:57

## 2025-01-11 RX ADMIN — BUDESONIDE 250 MCG: 0.25 SUSPENSION RESPIRATORY (INHALATION) at 07:56

## 2025-01-11 RX ADMIN — FAMOTIDINE 20 MG: 20 TABLET, FILM COATED ORAL at 21:15

## 2025-01-11 RX ADMIN — POLYETHYLENE GLYCOL 3350 17 G: 17 POWDER, FOR SOLUTION ORAL at 08:59

## 2025-01-11 RX ADMIN — INSULIN GLARGINE 10 UNITS: 100 INJECTION, SOLUTION SUBCUTANEOUS at 21:17

## 2025-01-11 RX ADMIN — PANTOPRAZOLE SODIUM 40 MG: 40 TABLET, DELAYED RELEASE ORAL at 06:11

## 2025-01-11 RX ADMIN — MEROPENEM 1000 MG: 1 INJECTION INTRAVENOUS at 10:05

## 2025-01-11 RX ADMIN — ARFORMOTEROL TARTRATE 15 MCG: 15 SOLUTION RESPIRATORY (INHALATION) at 07:56

## 2025-01-11 RX ADMIN — DEXTROSE MONOHYDRATE 125 ML: 100 INJECTION, SOLUTION INTRAVENOUS at 07:46

## 2025-01-11 RX ADMIN — MEROPENEM 1000 MG: 1 INJECTION INTRAVENOUS at 21:34

## 2025-01-11 RX ADMIN — ARFORMOTEROL TARTRATE 15 MCG: 15 SOLUTION RESPIRATORY (INHALATION) at 20:07

## 2025-01-11 RX ADMIN — ATORVASTATIN CALCIUM 40 MG: 40 TABLET, FILM COATED ORAL at 08:58

## 2025-01-11 RX ADMIN — TAMSULOSIN HYDROCHLORIDE 0.4 MG: 0.4 CAPSULE ORAL at 21:15

## 2025-01-11 RX ADMIN — FERROUS SULFATE TAB 325 MG (65 MG ELEMENTAL FE) 325 MG: 325 (65 FE) TAB at 08:57

## 2025-01-11 RX ADMIN — SODIUM CHLORIDE, PRESERVATIVE FREE 10 ML: 5 INJECTION INTRAVENOUS at 08:58

## 2025-01-11 RX ADMIN — FUROSEMIDE 40 MG: 10 INJECTION, SOLUTION INTRAMUSCULAR; INTRAVENOUS at 08:59

## 2025-01-11 RX ADMIN — BUDESONIDE 250 MCG: 0.25 SUSPENSION RESPIRATORY (INHALATION) at 20:07

## 2025-01-11 RX ADMIN — IPRATROPIUM BROMIDE AND ALBUTEROL SULFATE 1 DOSE: 2.5; .5 SOLUTION RESPIRATORY (INHALATION) at 07:56

## 2025-01-11 RX ADMIN — SACUBITRIL AND VALSARTAN 1 TABLET: 24; 26 TABLET, FILM COATED ORAL at 21:15

## 2025-01-11 RX ADMIN — IPRATROPIUM BROMIDE AND ALBUTEROL SULFATE 1 DOSE: 2.5; .5 SOLUTION RESPIRATORY (INHALATION) at 20:07

## 2025-01-11 RX ADMIN — SACUBITRIL AND VALSARTAN 1 TABLET: 24; 26 TABLET, FILM COATED ORAL at 08:57

## 2025-01-11 RX ADMIN — ENOXAPARIN SODIUM 30 MG: 100 INJECTION SUBCUTANEOUS at 08:58

## 2025-01-11 RX ADMIN — CLOPIDOGREL BISULFATE 75 MG: 75 TABLET ORAL at 08:57

## 2025-01-11 ASSESSMENT — PAIN SCALES - GENERAL: PAINLEVEL_OUTOF10: 0

## 2025-01-11 NOTE — PLAN OF CARE
Problem: Chronic Conditions and Co-morbidities  Goal: Patient's chronic conditions and co-morbidity symptoms are monitored and maintained or improved  1/10/2025 2115 by Yumiko Kendall RN  Outcome: Progressing  1/10/2025 1214 by Shonna Humphrey RN  Outcome: Progressing     Problem: Discharge Planning  Goal: Discharge to home or other facility with appropriate resources  1/10/2025 2115 by Yumiko Kendall RN  Outcome: Progressing  Flowsheets (Taken 1/10/2025 2055)  Discharge to home or other facility with appropriate resources: Identify discharge learning needs (meds, wound care, etc)  1/10/2025 1214 by Shonna Humphrey RN  Outcome: Progressing     Problem: Pain  Goal: Verbalizes/displays adequate comfort level or baseline comfort level  1/10/2025 2115 by Yumiko Kendall RN  Outcome: Progressing  1/10/2025 1214 by Shonna Humphrey RN  Outcome: Progressing     Problem: Safety - Adult  Goal: Free from fall injury  Outcome: Progressing

## 2025-01-12 PROBLEM — R06.02 SHORTNESS OF BREATH: Status: ACTIVE | Noted: 2025-01-12

## 2025-01-12 PROBLEM — H61.23 BILATERAL IMPACTED CERUMEN: Status: ACTIVE | Noted: 2025-01-12

## 2025-01-12 LAB
ANION GAP SERPL CALC-SCNC: 11 MMOL/L (ref 7–16)
BASOPHILS # BLD: 0.01 K/UL (ref 0–0.2)
BASOPHILS NFR BLD: 0.2 % (ref 0–2)
BUN SERPL-MCNC: 34 MG/DL (ref 8–23)
CALCIUM SERPL-MCNC: 8 MG/DL (ref 8.8–10.2)
CHLORIDE SERPL-SCNC: 109 MMOL/L (ref 98–107)
CO2 SERPL-SCNC: 21 MMOL/L (ref 20–29)
CREAT SERPL-MCNC: 1.54 MG/DL (ref 0.8–1.3)
DIFFERENTIAL METHOD BLD: ABNORMAL
EOSINOPHIL # BLD: 0.05 K/UL (ref 0–0.8)
EOSINOPHIL NFR BLD: 1 % (ref 0.5–7.8)
ERYTHROCYTE [DISTWIDTH] IN BLOOD BY AUTOMATED COUNT: 15.5 % (ref 11.9–14.6)
GLUCOSE BLD STRIP.AUTO-MCNC: 106 MG/DL (ref 65–100)
GLUCOSE BLD STRIP.AUTO-MCNC: 122 MG/DL (ref 65–100)
GLUCOSE BLD STRIP.AUTO-MCNC: 136 MG/DL (ref 65–100)
GLUCOSE BLD STRIP.AUTO-MCNC: 267 MG/DL (ref 65–100)
GLUCOSE BLD STRIP.AUTO-MCNC: 44 MG/DL (ref 65–100)
GLUCOSE BLD STRIP.AUTO-MCNC: 66 MG/DL (ref 65–100)
GLUCOSE BLD STRIP.AUTO-MCNC: 66 MG/DL (ref 65–100)
GLUCOSE BLD STRIP.AUTO-MCNC: 69 MG/DL (ref 65–100)
GLUCOSE BLD STRIP.AUTO-MCNC: 87 MG/DL (ref 65–100)
GLUCOSE SERPL-MCNC: 40 MG/DL (ref 70–99)
HCT VFR BLD AUTO: 32.6 % (ref 41.1–50.3)
HGB BLD-MCNC: 10.4 G/DL (ref 13.6–17.2)
IMM GRANULOCYTES # BLD AUTO: 0.02 K/UL (ref 0–0.5)
IMM GRANULOCYTES NFR BLD AUTO: 0.4 % (ref 0–5)
LYMPHOCYTES # BLD: 0.73 K/UL (ref 0.5–4.6)
LYMPHOCYTES NFR BLD: 14.2 % (ref 13–44)
MCH RBC QN AUTO: 32.1 PG (ref 26.1–32.9)
MCHC RBC AUTO-ENTMCNC: 31.9 G/DL (ref 31.4–35)
MCV RBC AUTO: 100.6 FL (ref 82–102)
MONOCYTES # BLD: 0.46 K/UL (ref 0.1–1.3)
MONOCYTES NFR BLD: 9 % (ref 4–12)
NEUTS SEG # BLD: 3.86 K/UL (ref 1.7–8.2)
NEUTS SEG NFR BLD: 75.2 % (ref 43–78)
NRBC # BLD: 0 K/UL (ref 0–0.2)
PLATELET # BLD AUTO: 122 K/UL (ref 150–450)
PMV BLD AUTO: 10.2 FL (ref 9.4–12.3)
POTASSIUM SERPL-SCNC: 4.5 MMOL/L (ref 3.5–5.1)
RBC # BLD AUTO: 3.24 M/UL (ref 4.23–5.6)
SERVICE CMNT-IMP: ABNORMAL
SERVICE CMNT-IMP: NORMAL
SODIUM SERPL-SCNC: 141 MMOL/L (ref 136–145)
WBC # BLD AUTO: 5.1 K/UL (ref 4.3–11.1)

## 2025-01-12 PROCEDURE — 85025 COMPLETE CBC W/AUTO DIFF WBC: CPT

## 2025-01-12 PROCEDURE — 36415 COLL VENOUS BLD VENIPUNCTURE: CPT

## 2025-01-12 PROCEDURE — 94640 AIRWAY INHALATION TREATMENT: CPT

## 2025-01-12 PROCEDURE — 2580000003 HC RX 258: Performed by: FAMILY MEDICINE

## 2025-01-12 PROCEDURE — 82962 GLUCOSE BLOOD TEST: CPT

## 2025-01-12 PROCEDURE — 1100000000 HC RM PRIVATE

## 2025-01-12 PROCEDURE — 6370000000 HC RX 637 (ALT 250 FOR IP)

## 2025-01-12 PROCEDURE — 94760 N-INVAS EAR/PLS OXIMETRY 1: CPT

## 2025-01-12 PROCEDURE — 99232 SBSQ HOSP IP/OBS MODERATE 35: CPT | Performed by: INTERNAL MEDICINE

## 2025-01-12 PROCEDURE — 80048 BASIC METABOLIC PNL TOTAL CA: CPT

## 2025-01-12 PROCEDURE — 6360000002 HC RX W HCPCS: Performed by: FAMILY MEDICINE

## 2025-01-12 PROCEDURE — 2500000003 HC RX 250 WO HCPCS: Performed by: FAMILY MEDICINE

## 2025-01-12 PROCEDURE — 6370000000 HC RX 637 (ALT 250 FOR IP): Performed by: FAMILY MEDICINE

## 2025-01-12 RX ORDER — INSULIN GLARGINE 100 [IU]/ML
6 INJECTION, SOLUTION SUBCUTANEOUS NIGHTLY
Status: DISCONTINUED | OUTPATIENT
Start: 2025-01-12 | End: 2025-01-14 | Stop reason: HOSPADM

## 2025-01-12 RX ADMIN — ASPIRIN 81 MG: 81 TABLET, CHEWABLE ORAL at 08:50

## 2025-01-12 RX ADMIN — CLOPIDOGREL BISULFATE 75 MG: 75 TABLET ORAL at 08:50

## 2025-01-12 RX ADMIN — CARVEDILOL 25 MG: 25 TABLET, FILM COATED ORAL at 08:50

## 2025-01-12 RX ADMIN — PANTOPRAZOLE SODIUM 40 MG: 40 TABLET, DELAYED RELEASE ORAL at 06:12

## 2025-01-12 RX ADMIN — SACUBITRIL AND VALSARTAN 1 TABLET: 24; 26 TABLET, FILM COATED ORAL at 21:44

## 2025-01-12 RX ADMIN — SACUBITRIL AND VALSARTAN 1 TABLET: 24; 26 TABLET, FILM COATED ORAL at 08:50

## 2025-01-12 RX ADMIN — MEROPENEM 1000 MG: 1 INJECTION INTRAVENOUS at 21:49

## 2025-01-12 RX ADMIN — BUDESONIDE 250 MCG: 0.25 SUSPENSION RESPIRATORY (INHALATION) at 07:58

## 2025-01-12 RX ADMIN — INSULIN LISPRO 4 UNITS: 100 INJECTION, SOLUTION INTRAVENOUS; SUBCUTANEOUS at 21:46

## 2025-01-12 RX ADMIN — SODIUM CHLORIDE, PRESERVATIVE FREE 10 ML: 5 INJECTION INTRAVENOUS at 08:52

## 2025-01-12 RX ADMIN — EMPAGLIFLOZIN 10 MG: 10 TABLET, FILM COATED ORAL at 08:50

## 2025-01-12 RX ADMIN — MEROPENEM 1000 MG: 1 INJECTION INTRAVENOUS at 09:35

## 2025-01-12 RX ADMIN — POLYETHYLENE GLYCOL 3350 17 G: 17 POWDER, FOR SOLUTION ORAL at 08:50

## 2025-01-12 RX ADMIN — INSULIN GLARGINE 6 UNITS: 100 INJECTION, SOLUTION SUBCUTANEOUS at 21:45

## 2025-01-12 RX ADMIN — ATORVASTATIN CALCIUM 40 MG: 40 TABLET, FILM COATED ORAL at 08:50

## 2025-01-12 RX ADMIN — FAMOTIDINE 20 MG: 20 TABLET, FILM COATED ORAL at 21:44

## 2025-01-12 RX ADMIN — FUROSEMIDE 40 MG: 10 INJECTION, SOLUTION INTRAMUSCULAR; INTRAVENOUS at 08:52

## 2025-01-12 RX ADMIN — SODIUM CHLORIDE, PRESERVATIVE FREE 10 ML: 5 INJECTION INTRAVENOUS at 21:47

## 2025-01-12 RX ADMIN — IPRATROPIUM BROMIDE AND ALBUTEROL SULFATE 1 DOSE: 2.5; .5 SOLUTION RESPIRATORY (INHALATION) at 07:58

## 2025-01-12 RX ADMIN — FERROUS SULFATE TAB 325 MG (65 MG ELEMENTAL FE) 325 MG: 325 (65 FE) TAB at 08:50

## 2025-01-12 RX ADMIN — IPRATROPIUM BROMIDE AND ALBUTEROL SULFATE 1 DOSE: 2.5; .5 SOLUTION RESPIRATORY (INHALATION) at 14:41

## 2025-01-12 RX ADMIN — ENOXAPARIN SODIUM 30 MG: 100 INJECTION SUBCUTANEOUS at 08:51

## 2025-01-12 RX ADMIN — CARVEDILOL 25 MG: 25 TABLET, FILM COATED ORAL at 21:44

## 2025-01-12 RX ADMIN — TAMSULOSIN HYDROCHLORIDE 0.4 MG: 0.4 CAPSULE ORAL at 21:44

## 2025-01-12 RX ADMIN — ARFORMOTEROL TARTRATE 15 MCG: 15 SOLUTION RESPIRATORY (INHALATION) at 07:58

## 2025-01-12 ASSESSMENT — PAIN SCALES - GENERAL: PAINLEVEL_OUTOF10: 0

## 2025-01-12 NOTE — PLAN OF CARE
Problem: Chronic Conditions and Co-morbidities  Goal: Patient's chronic conditions and co-morbidity symptoms are monitored and maintained or improved  1/11/2025 2213 by Yumiko Kendall RN  Outcome: Progressing  1/11/2025 1342 by Shonna Humphrey RN  Outcome: Progressing     Problem: Discharge Planning  Goal: Discharge to home or other facility with appropriate resources  1/11/2025 2213 by Yumiko Kendall RN  Outcome: Progressing  Flowsheets (Taken 1/11/2025 2107)  Discharge to home or other facility with appropriate resources: Identify discharge learning needs (meds, wound care, etc)  1/11/2025 1342 by Shonna Humphrey RN  Outcome: Progressing     Problem: Pain  Goal: Verbalizes/displays adequate comfort level or baseline comfort level  1/11/2025 2213 by Yumiko Kendall RN  Outcome: Progressing  1/11/2025 1342 by Shonna Humphrey RN  Outcome: Progressing     Problem: Safety - Adult  Goal: Free from fall injury  1/11/2025 2213 by Yumiko Kendall RN  Outcome: Progressing  1/11/2025 1342 by Shonna Humphrey RN  Outcome: Progressing

## 2025-01-12 NOTE — CONSULTS
Infectious Diseases Consult    Today's Date: 2025   Admit Date: 2025  : 1945    Impression/Plan   Possible ESBL E coli UTI - symptoms of abdominal pain, says some trouble urinating - complete 5 days of merrem with eot 25  BPH on Flomax  HF with volume overload, and small pleural effusion  DM II  Hard of hearing    ID will sign off. Please call with any further qeustions    Anti-infectives:   Meropenem (-    Subjective:   Date of Consultation:  2025  Referring Physician: Dudley Burgess MD for: assistance in management of the above    Patient is a 79 y.o. male with an underlying medical historyr of hypertension, DM II, CHF, and atrial fibrillation, history of BPH on flomax. He presented to the ED with shortness of breath and bilateral lower extremity edema, CXR with small R pleural effusion, and UA with nitrites and leukocyte esterase, >1000 glucose, 4+ bacteria, and cx growing >100K colonies of ESBL E coli. He has been treated with meropenem and ID has been asked for further recommendations. He doesn't really remember why he came here, says he is feeling better. Did have abdominal pain on admission and some problems urinating    Past Medical History:   Diagnosis Date    BPH (benign prostatic hyperplasia)     CHF (NYHA class III, ACC/AHA stage C) ([facility])     Chronic kidney disease     CKD (chronic kidney disease)     COPD (chronic obstructive pulmonary disease) ([facility])     Coronary atherosclerosis of native coronary artery     Diabetes mellitus with circulatory complication ([facility])     DM neuropathies ([facility])     Dyslipidemia     GERD (gastroesophageal reflux disease)     Hyperlipidemia     Hypertension     Ischemic cardiomyopathy     Mitral regurgitation     PVD (peripheral vascular disease) ([facility])        Social History     Tobacco Use    Smoking status: Every Day     Current packs/day: 1.00     Types: Cigarettes    Smokeless tobacco: Never    Tobacco comments:     Quit smoking: 
Nutrition Assessment  Assessment Type: Initial, Consult  Reason for visit:  General Nutrition Management/other reason (Hospitalists)-malnutrition  Malnutrition Screening Tool Score: 0    Nutrition Intervention:   Food and/or Nutrient Delivery:   Meals and Snacks:  Diet: Add CCHO feature 5 CHO per meal  Medical Food Supplements:   Medical food supplement therapy:  Initiate Glucerna Nutrition Shake (diabetic oral supplement) 220 calories, 10 grams protein per 8 ounce serving       Malnutrition Assessment:  Malnutrition Status: Moderate malnutrition  Context: Chronic Illness  Findings of clinical characteristics of malnutrition:   Energy Intake:  Unable to assess  Weight Loss:  No weight loss     Body Fat Loss:  Mild body fat loss (visual, mild to moderate) Triceps, Fat Overlying Ribs, Buccal region   Muscle Mass Loss:  Mild muscle mass loss (visual, moderate) Temples (temporalis), Clavicles (pectoralis & deltoids), Hand (interosseous), Scapula (trapezius)      Nutrition Assessment:  Food/Nutrition Related History: Unable to provide any quantifiable nutrition history. Pt says he can't afford Ensure but someone gave him some a couple days before admission.        Weight History: Per EMR review/IM and Cardiology OV:  1/7/2025 121#   11/19/2024 120 lbs 6 oz   11/15/2024 123 lbs   10/3/2024 113 lbs   8/21/2024 118 lbs 6 oz   7/12/2024 117 lbs 13 oz   5/30/2024 119 lbs   5/17/2024 122 lbs   Pt with more recent trend of weight gain, though variances could be fluid related given presentation with peripherla edema  Nutrition Background:       Pertinent PMH/PSH:DM, GERD, HTN, tobacco abuse, COPD, CHF, CKD, CAD s/p CABG, HLD, BPH, [place] .   Presented with peripheral edema for the last 2 to 3 weeks along with shortness of breath and weakness.   Admitted with UTI, CHF, constipation, anemia    Nutrition Monitoring/Evaluation:  Pt seen sitting in bed. Severe [place] limited information that could be obtained. He says he will drink Ensure. 
Oral Q6H PRN    sacubitril-valsartan (ENTRESTO) 24-26 MG per tablet 1 tablet  1 tablet Oral BID    ferrous sulfate (IRON 325) tablet 325 mg  325 mg Oral Daily with breakfast    fluticasone (FLONASE) 50 MCG/ACT nasal spray 2 spray  2 spray Each Nostril Daily PRN    famotidine (PEPCID) tablet 20 mg  20 mg Oral QHS    pantoprazole (PROTONIX) tablet 40 mg  40 mg Oral QAM AC    clopidogrel (PLAVIX) tablet 75 mg  75 mg Oral Daily    empagliflozin (JARDIANCE) tablet 10 mg  10 mg Oral Daily    albuterol (PROVENTIL) (2.5 MG/3ML) 0.083% nebulizer solution 2.5 mg  2.5 mg Nebulization Q6H PRN    sodium chloride flush 0.9 % injection 5-40 mL  5-40 mL IntraVENous 2 times per day    sodium chloride flush 0.9 % injection 5-40 mL  5-40 mL IntraVENous PRN    0.9 % sodium chloride infusion   IntraVENous PRN    enoxaparin Sodium (LOVENOX) injection 30 mg  30 mg SubCUTAneous Daily    ondansetron (ZOFRAN-ODT) disintegrating tablet 4 mg  4 mg Oral Q8H PRN    Or    ondansetron (ZOFRAN) injection 4 mg  4 mg IntraVENous Q6H PRN    polyethylene glycol (GLYCOLAX) packet 17 g  17 g Oral Daily PRN    acetaminophen (TYLENOL) tablet 650 mg  650 mg Oral Q6H PRN    Or    acetaminophen (TYLENOL) suppository 650 mg  650 mg Rectal Q6H PRN    [Held by provider] torsemide (DEMADEX) tablet 10 mg  10 mg Oral Daily    furosemide (LASIX) injection 40 mg  40 mg IntraVENous Daily       Review of Systems    Review of Systems   Constitutional: Negative for chills, decreased appetite, fever, malaise/fatigue and weight gain.   HENT:  Negative for nosebleeds.    Eyes:  Negative for blurred vision and double vision.   Cardiovascular:  Positive for dyspnea on exertion and leg swelling. Negative for chest pain, claudication, orthopnea, palpitations, paroxysmal nocturnal dyspnea and syncope.   Respiratory:  Negative for cough, hemoptysis and shortness of breath.    Endocrine: Negative for cold intolerance and heat intolerance.   Hematologic/Lymphatic: Negative for

## 2025-01-12 NOTE — PLAN OF CARE
Problem: Chronic Conditions and Co-morbidities  Goal: Patient's chronic conditions and co-morbidity symptoms are monitored and maintained or improved  1/12/2025 1107 by Shonna Humphrey RN  Outcome: Progressing  1/11/2025 2213 by Yumiko Kendall RN  Outcome: Progressing     Problem: Discharge Planning  Goal: Discharge to home or other facility with appropriate resources  1/12/2025 1107 by Shonna Humphrey RN  Outcome: Progressing  1/11/2025 2213 by Yumiko Kendall RN  Outcome: Progressing  Flowsheets (Taken 1/11/2025 2107)  Discharge to home or other facility with appropriate resources: Identify discharge learning needs (meds, wound care, etc)     Problem: Pain  Goal: Verbalizes/displays adequate comfort level or baseline comfort level  1/12/2025 1107 by Shonna Humphrey RN  Outcome: Progressing  1/11/2025 2213 by Yumiko Kendall RN  Outcome: Progressing     Problem: Safety - Adult  Goal: Free from fall injury  1/12/2025 1107 by Shonna Humphrey RN  Outcome: Progressing  1/11/2025 2213 by Yumiko Kendall RN  Outcome: Progressing

## 2025-01-13 LAB
ANION GAP SERPL CALC-SCNC: 9 MMOL/L (ref 7–16)
BACTERIA SPEC CULT: ABNORMAL
BACTERIA SPEC CULT: ABNORMAL
BASOPHILS # BLD: 0.02 K/UL (ref 0–0.2)
BASOPHILS NFR BLD: 0.5 % (ref 0–2)
BUN SERPL-MCNC: 34 MG/DL (ref 8–23)
CALCIUM SERPL-MCNC: 7.7 MG/DL (ref 8.8–10.2)
CHLORIDE SERPL-SCNC: 110 MMOL/L (ref 98–107)
CO2 SERPL-SCNC: 24 MMOL/L (ref 20–29)
CREAT SERPL-MCNC: 1.4 MG/DL (ref 0.8–1.3)
DIFFERENTIAL METHOD BLD: ABNORMAL
EOSINOPHIL # BLD: 0.07 K/UL (ref 0–0.8)
EOSINOPHIL NFR BLD: 1.7 % (ref 0.5–7.8)
ERYTHROCYTE [DISTWIDTH] IN BLOOD BY AUTOMATED COUNT: 15.4 % (ref 11.9–14.6)
GLUCOSE BLD STRIP.AUTO-MCNC: 147 MG/DL (ref 65–100)
GLUCOSE BLD STRIP.AUTO-MCNC: 157 MG/DL (ref 65–100)
GLUCOSE BLD STRIP.AUTO-MCNC: 170 MG/DL (ref 65–100)
GLUCOSE BLD STRIP.AUTO-MCNC: 187 MG/DL (ref 65–100)
GLUCOSE BLD STRIP.AUTO-MCNC: 78 MG/DL (ref 65–100)
GLUCOSE BLD STRIP.AUTO-MCNC: 83 MG/DL (ref 65–100)
GLUCOSE BLD STRIP.AUTO-MCNC: 96 MG/DL (ref 65–100)
GLUCOSE SERPL-MCNC: 89 MG/DL (ref 70–99)
HCT VFR BLD AUTO: 29.6 % (ref 41.1–50.3)
HGB BLD-MCNC: 9.7 G/DL (ref 13.6–17.2)
IMM GRANULOCYTES # BLD AUTO: 0.02 K/UL (ref 0–0.5)
IMM GRANULOCYTES NFR BLD AUTO: 0.5 % (ref 0–5)
LYMPHOCYTES # BLD: 0.58 K/UL (ref 0.5–4.6)
LYMPHOCYTES NFR BLD: 14.2 % (ref 13–44)
MCH RBC QN AUTO: 32.9 PG (ref 26.1–32.9)
MCHC RBC AUTO-ENTMCNC: 32.8 G/DL (ref 31.4–35)
MCV RBC AUTO: 100.3 FL (ref 82–102)
MONOCYTES # BLD: 0.42 K/UL (ref 0.1–1.3)
MONOCYTES NFR BLD: 10.3 % (ref 4–12)
NEUTS SEG # BLD: 2.97 K/UL (ref 1.7–8.2)
NEUTS SEG NFR BLD: 72.8 % (ref 43–78)
NRBC # BLD: 0 K/UL (ref 0–0.2)
PLATELET # BLD AUTO: 98 K/UL (ref 150–450)
PMV BLD AUTO: 10 FL (ref 9.4–12.3)
POTASSIUM SERPL-SCNC: 4 MMOL/L (ref 3.5–5.1)
RBC # BLD AUTO: 2.95 M/UL (ref 4.23–5.6)
SERVICE CMNT-IMP: ABNORMAL
SERVICE CMNT-IMP: NORMAL
SODIUM SERPL-SCNC: 143 MMOL/L (ref 136–145)
WBC # BLD AUTO: 4.1 K/UL (ref 4.3–11.1)

## 2025-01-13 PROCEDURE — 36415 COLL VENOUS BLD VENIPUNCTURE: CPT

## 2025-01-13 PROCEDURE — 2500000003 HC RX 250 WO HCPCS: Performed by: FAMILY MEDICINE

## 2025-01-13 PROCEDURE — 80048 BASIC METABOLIC PNL TOTAL CA: CPT

## 2025-01-13 PROCEDURE — 2580000003 HC RX 258: Performed by: FAMILY MEDICINE

## 2025-01-13 PROCEDURE — 85025 COMPLETE CBC W/AUTO DIFF WBC: CPT

## 2025-01-13 PROCEDURE — 6370000000 HC RX 637 (ALT 250 FOR IP)

## 2025-01-13 PROCEDURE — 6360000002 HC RX W HCPCS: Performed by: FAMILY MEDICINE

## 2025-01-13 PROCEDURE — 6370000000 HC RX 637 (ALT 250 FOR IP): Performed by: FAMILY MEDICINE

## 2025-01-13 PROCEDURE — 94760 N-INVAS EAR/PLS OXIMETRY 1: CPT

## 2025-01-13 PROCEDURE — 82962 GLUCOSE BLOOD TEST: CPT

## 2025-01-13 PROCEDURE — 94761 N-INVAS EAR/PLS OXIMETRY MLT: CPT

## 2025-01-13 PROCEDURE — 1100000000 HC RM PRIVATE

## 2025-01-13 PROCEDURE — 94640 AIRWAY INHALATION TREATMENT: CPT

## 2025-01-13 PROCEDURE — 97530 THERAPEUTIC ACTIVITIES: CPT

## 2025-01-13 RX ORDER — IPRATROPIUM BROMIDE AND ALBUTEROL SULFATE 2.5; .5 MG/3ML; MG/3ML
1 SOLUTION RESPIRATORY (INHALATION) EVERY 6 HOURS PRN
Status: DISCONTINUED | OUTPATIENT
Start: 2025-01-13 | End: 2025-01-14 | Stop reason: HOSPADM

## 2025-01-13 RX ORDER — FUROSEMIDE 40 MG/1
40 TABLET ORAL DAILY
Status: DISCONTINUED | OUTPATIENT
Start: 2025-01-13 | End: 2025-01-14 | Stop reason: HOSPADM

## 2025-01-13 RX ADMIN — CLOPIDOGREL BISULFATE 75 MG: 75 TABLET ORAL at 09:37

## 2025-01-13 RX ADMIN — ASPIRIN 81 MG: 81 TABLET, CHEWABLE ORAL at 09:37

## 2025-01-13 RX ADMIN — PANTOPRAZOLE SODIUM 40 MG: 40 TABLET, DELAYED RELEASE ORAL at 05:58

## 2025-01-13 RX ADMIN — FERROUS SULFATE TAB 325 MG (65 MG ELEMENTAL FE) 325 MG: 325 (65 FE) TAB at 09:37

## 2025-01-13 RX ADMIN — ARFORMOTEROL TARTRATE 15 MCG: 15 SOLUTION RESPIRATORY (INHALATION) at 08:03

## 2025-01-13 RX ADMIN — CARVEDILOL 25 MG: 25 TABLET, FILM COATED ORAL at 09:48

## 2025-01-13 RX ADMIN — BUDESONIDE 250 MCG: 0.25 SUSPENSION RESPIRATORY (INHALATION) at 08:03

## 2025-01-13 RX ADMIN — EMPAGLIFLOZIN 10 MG: 10 TABLET, FILM COATED ORAL at 09:37

## 2025-01-13 RX ADMIN — SACUBITRIL AND VALSARTAN 1 TABLET: 24; 26 TABLET, FILM COATED ORAL at 09:39

## 2025-01-13 RX ADMIN — BUDESONIDE 250 MCG: 0.25 SUSPENSION RESPIRATORY (INHALATION) at 20:35

## 2025-01-13 RX ADMIN — IPRATROPIUM BROMIDE AND ALBUTEROL SULFATE 1 DOSE: 2.5; .5 SOLUTION RESPIRATORY (INHALATION) at 08:03

## 2025-01-13 RX ADMIN — ATORVASTATIN CALCIUM 40 MG: 40 TABLET, FILM COATED ORAL at 09:36

## 2025-01-13 RX ADMIN — FUROSEMIDE 40 MG: 40 TABLET ORAL at 15:18

## 2025-01-13 RX ADMIN — INSULIN LISPRO 2 UNITS: 100 INJECTION, SOLUTION INTRAVENOUS; SUBCUTANEOUS at 17:45

## 2025-01-13 RX ADMIN — MEROPENEM 1000 MG: 1 INJECTION INTRAVENOUS at 09:47

## 2025-01-13 RX ADMIN — HYDROCODONE BITARTRATE AND ACETAMINOPHEN 1 TABLET: 10; 325 TABLET ORAL at 00:01

## 2025-01-13 RX ADMIN — MEROPENEM 1000 MG: 1 INJECTION INTRAVENOUS at 21:25

## 2025-01-13 RX ADMIN — INSULIN GLARGINE 6 UNITS: 100 INJECTION, SOLUTION SUBCUTANEOUS at 20:25

## 2025-01-13 RX ADMIN — ENOXAPARIN SODIUM 30 MG: 100 INJECTION SUBCUTANEOUS at 09:36

## 2025-01-13 RX ADMIN — FAMOTIDINE 20 MG: 20 TABLET, FILM COATED ORAL at 20:25

## 2025-01-13 RX ADMIN — CARVEDILOL 25 MG: 25 TABLET, FILM COATED ORAL at 20:25

## 2025-01-13 RX ADMIN — ARFORMOTEROL TARTRATE 15 MCG: 15 SOLUTION RESPIRATORY (INHALATION) at 20:35

## 2025-01-13 RX ADMIN — SODIUM CHLORIDE, PRESERVATIVE FREE 10 ML: 5 INJECTION INTRAVENOUS at 20:29

## 2025-01-13 RX ADMIN — SACUBITRIL AND VALSARTAN 1 TABLET: 24; 26 TABLET, FILM COATED ORAL at 20:25

## 2025-01-13 RX ADMIN — SODIUM CHLORIDE, PRESERVATIVE FREE 10 ML: 5 INJECTION INTRAVENOUS at 09:41

## 2025-01-13 RX ADMIN — TAMSULOSIN HYDROCHLORIDE 0.4 MG: 0.4 CAPSULE ORAL at 20:25

## 2025-01-13 ASSESSMENT — PAIN SCALES - GENERAL
PAINLEVEL_OUTOF10: 2
PAINLEVEL_OUTOF10: 4

## 2025-01-13 ASSESSMENT — PAIN - FUNCTIONAL ASSESSMENT: PAIN_FUNCTIONAL_ASSESSMENT: ACTIVITIES ARE NOT PREVENTED

## 2025-01-13 ASSESSMENT — PAIN DESCRIPTION - LOCATION
LOCATION: GENERALIZED
LOCATION: GENERALIZED

## 2025-01-13 ASSESSMENT — PAIN DESCRIPTION - DESCRIPTORS: DESCRIPTORS: ACHING

## 2025-01-13 NOTE — PLAN OF CARE
Problem: Chronic Conditions and Co-morbidities  Goal: Patient's chronic conditions and co-morbidity symptoms are monitored and maintained or improved  1/12/2025 2203 by Yumiko Kendall RN  Outcome: Progressing  1/12/2025 1107 by Shonna Humphrey RN  Outcome: Progressing     Problem: Discharge Planning  Goal: Discharge to home or other facility with appropriate resources  1/12/2025 2203 by Yumiko Kendall RN  Outcome: Progressing  Flowsheets (Taken 1/12/2025 2150)  Discharge to home or other facility with appropriate resources: Identify discharge learning needs (meds, wound care, etc)  1/12/2025 1107 by Shonna Humphrey RN  Outcome: Progressing     Problem: Pain  Goal: Verbalizes/displays adequate comfort level or baseline comfort level  1/12/2025 2203 by Yumiko Kendall RN  Outcome: Progressing  1/12/2025 1107 by Shonna Humphrey RN  Outcome: Progressing     Problem: Safety - Adult  Goal: Free from fall injury  1/12/2025 2203 by Yumiko Kendall RN  Outcome: Progressing  1/12/2025 1107 by Shonna Humphrey RN  Outcome: Progressing

## 2025-01-13 NOTE — PROGRESS NOTES
[facility] CARDIOLOGY PROGRESS NOTE           1/12/2025 2:38 PM    Admit Date: 1/9/2025      Subjective:   Weak, no CP, no edema.  SOB better now.      ROS:  Cardiovascular:  As noted above    Objective:      Vitals:    01/11/25 2109 01/12/25 0704 01/12/25 0802 01/12/25 0850   BP: (!) 129/57 125/62  125/62   Pulse: 91 77 77 77   Resp:  16 16    Temp:  98.2 °F (36.8 °C)     TempSrc:  Oral     SpO2:  98% 98%    Weight:       Height:           Physical Exam:  General-No Acute Distress, hard of hearing.   Neck- supple, no JVD  CV- regular rate and rhythm no MRG  Lung- clear bilaterally with dec BS in the bases  Abd- soft, nontender, nondistended  Ext- no edema bilaterally.  Skin- warm and dry    Data Review:   Recent Labs     01/09/25  1616 01/10/25  0551 01/11/25  0942 01/12/25  0621      < > 141 141   K 4.9   < > 4.0 4.5   MG 2.1  --   --   --    BUN 37*   < > 36* 34*   WBC 3.1*   < > 6.3 5.1   HGB 10.7*   < > 10.4* 10.4*   HCT 34.3*   < > 32.5* 32.6*   *   < > 115* 122*    < > = values in this interval not displayed.       Assessment/Plan:     HFrEF:  BiVHF on echo with EF 25%.  Med mgmt, seems euvolemic now.  Cautious with aggressive diuresis in the setting of severely elevated RVSP/RV dysfunction.   Remain on GDMT, change back to PO diuretic dose in next day or two.   CKD:  follow, monitor with diuresis.    CAD:  prior CABG and MV repair, remain on meds.    UTI: remain on abx.    pAfib?  Follow for now.  Not on AC it seems as outpatient.      Dispo:  consider palliative care assessment for outpatient mgmt.     We will be on standby, please call as needed. Convert back to PO diuretic dose in next day or two.  Remain on meds.  Will need close follow up in our office.  Again, cautious with aggressive diuresis in the setting of severely elevated RVSP/RV dysfunction.       Miguel West, DO  1/12/2025 2:38 PM    
ACUTE PHYSICAL THERAPY GOALS:   (Developed with and agreed upon by patient and/or caregiver.)  STG:  (1.)Mr. Shipley will move from supine to sit and sit to supine  with INDEPENDENCE within 4 treatment day(s).    (2.)Mr. Shipley will transfer from bed to chair and chair to bed with MODIFIED INDEPENDENCE using the least restrictive device within 4 treatment day(s).    (3.)Mr. Shipley will ambulate with MODIFIED INDEPENDENCE for 250 feet with the least restrictive device within 4 treatment day(s).   (4.)Pt. will climb up/down 15 steps with rails and SBA within 4 days      ________________________________________________________________________________________________     PHYSICAL THERAPY Daily Note and PM  (Link to Caseload Tracking: PT Visit Days : 2  Acknowledge Orders  Time In/Out  PT Charge Capture  Rehab Caseload Tracker    Beto Shipley is a 79 y.o. male   PRIMARY DIAGNOSIS: UTI (urinary tract infection)  Shortness of breath [R06.02]  UTI (urinary tract infection) [N39.0]  Bilateral impacted cerumen [H61.23]  Pleural effusion, right [J90]  Elevated brain natriuretic peptide (BNP) level [R79.89]  Urinary tract infection with hematuria, site unspecified [N39.0, R31.9]  Chest pain, unspecified type [R07.9]       Reason for Referral: Generalized Muscle Weakness (M62.81)  Other abnormalities of gait and mobility (R26.89)  Inpatient: Payor: [employer] MEDICARE / Plan: [employer] / Product Type: *No Product type* /     ASSESSMENT:     REHAB RECOMMENDATIONS:   Recommendation to date pending progress:  Setting:  Home Health Therapy    Equipment:     Has a cane     ASSESSMENT:  Mr. Shipley is admitted with JESSICA and is experiencing a  decline in his premorbid level of functional independence. He states he resides in  2nd floor apt alone. He has 14 steps to navigate. He is [place]. He would benefit from further PT while here to address these deficits: mild decrease in Le strength, decreased endurance, standing 
Patient resting in bed. Very hard of hearing, however A&O x4. Respirations are even and unlabored with 98% oxygen saturation on room air. No noted signs of distress. Patient denies pain, and no needs or wants voiced at this time. Instructed Patient to use call light for assistance. Call light within reach. Bed in lowest position. Report given to incoming nurse.  
Patient sister Nawaf Amado called and provided her home phone number as [phone]. She stated listed mobile number still works but her preference is to be contacted at this provided home phone number.  
Presented to patient room at approximately 0715 due to patient yelling. Patient found to be alert and confused (previously A&O times 4). Patient still able to follow commands. Vitals obtained including rectal temp of 95.4 and provider notified of vital sign values. POC glucose revealed glucose of 37 mg/dl. Hypoglycemic protocol initiated. Patient given 8 ounces of juice followed by peanut butter and crackers. POC glucose retaken after 15 minutes and was 45 mg/dl. D10 bolus initiated per PRN orders. POC glucose to be retaken 15 minutes after bolus completion. Per MD recheck oral temp once patient calms down. Dayshift RN given report at patients bedside and notified of all above information. Patient resting in bed pleasantly confused with fall precaution measures in place.  
TRANSFER - IN REPORT:    Verbal report received from RONNY Valera on Beto Shipley  being received from ED for routine progression of patient care      Report consisted of patient's Situation, Background, Assessment and   Recommendations(SBAR).     Information from the following report(s) Intake/Output, MAR, and Recent Results was reviewed with the receiving nurse.    Opportunity for questions and clarification was provided.      Assessment will be completed upon patient's arrival to unit and care assumed.     
as above     HLD  -continue statin       Macrocytic Anemia  -daily CBC  -B12, folate WNL       Type 2 diabetes with nephropathy (HCC)  -lantus 6u daily  -ISS while inpatient       Atrial fibrillation (HCC)  -rate controlled  -does not appear to be on AC as outpatient       BPH  -continue home flomax     GERD  -PPI     Constipation  -daily miralax      Moderate protein calorie malnutrition  -BMI 17.9  -temporal muscle wasting on exam  -nutrition consult for AM     PAD  -outpatient vascular surgery follow up        Anticipated Discharge Arrangements:   Home Health     PT/OT evals ordered?  Therapy evals ordered  Diet:  ADULT DIET; Regular; Low Fat/Low Chol/High Fiber/2 gm Na  VTE prophylaxis: Lovenox  Code status: Full Code      Non-peripheral Lines and Tubes (if present):          Telemetry (if present):  Cardiac/Telemetry Monitor On: No        Hospital Problems:  Principal Problem:    UTI (urinary tract infection)  Active Problems:    BPH (benign prostatic hyperplasia)    Essential hypertension    Dyslipidemia    HFrEF (heart failure with reduced ejection fraction) (HCC)    Anemia    Type 2 diabetes with nephropathy (HCC)    Moderate protein-calorie malnutrition (HCC)    Atrial fibrillation (HCC)    Hypothyroidism  Resolved Problems:    * No resolved hospital problems. *      Objective:   Patient Vitals for the past 24 hrs:   Temp Pulse Resp BP SpO2   01/12/25 0850 -- 77 -- 125/62 --   01/12/25 0802 -- 77 16 -- 98 %   01/12/25 0704 98.2 °F (36.8 °C) 77 16 125/62 98 %   01/11/25 2109 -- 91 -- (!) 129/57 --   01/11/25 2007 97.5 °F (36.4 °C) 79 16 (!) 127/58 99 %       Oxygen Therapy  SpO2: 98 %  Pulse Oximetry Type: Continuous  Pulse via Oximetry: 90 beats per minute  Pulse Oximeter Device Mode: Intermittent  Pulse Oximeter Device Location: Left, Finger  O2 Device: None (Room air)    Estimated body mass index is 18.31 kg/m² as calculated from the following:    Height as of this encounter: 1.753 m (5' 9\").    Weight as 
31.4 - 35.0 g/dL    RDW 15.2 (H) 11.9 - 14.6 %    Platelets 111 (L) 150 - 450 K/uL    MPV 10.1 9.4 - 12.3 FL    nRBC 0.00 0.0 - 0.2 K/uL    Differential Type AUTOMATED      Neutrophils % 68.5 43.0 - 78.0 %    Lymphocytes % 21.9 13.0 - 44.0 %    Monocytes % 6.8 4.0 - 12.0 %    Eosinophils % 1.9 0.5 - 7.8 %    Basophils % 0.6 0.0 - 2.0 %    Immature Granulocytes % 0.3 0.0 - 5.0 %    Neutrophils Absolute 2.13 1.70 - 8.20 K/UL    Lymphocytes Absolute 0.68 0.50 - 4.60 K/UL    Monocytes Absolute 0.21 0.10 - 1.30 K/UL    Eosinophils Absolute 0.06 0.00 - 0.80 K/UL    Basophils Absolute 0.02 0.00 - 0.20 K/UL    Immature Granulocytes Absolute 0.01 0.0 - 0.5 K/UL   Comprehensive Metabolic Panel    Collection Time: 01/09/25  4:16 PM   Result Value Ref Range    Sodium 142 136 - 145 mmol/L    Potassium 4.9 3.5 - 5.1 mmol/L    Chloride 112 (H) 98 - 107 mmol/L    CO2 21 20 - 29 mmol/L    Anion Gap 9 7 - 16 mmol/L    Glucose 183 (H) 70 - 99 mg/dL    BUN 37 (H) 8 - 23 MG/DL    Creatinine 1.62 (H) 0.80 - 1.30 MG/DL    Est, Glom Filt Rate 43 (L) >60 ml/min/1.73m2    Calcium 8.0 (L) 8.8 - 10.2 MG/DL    Total Bilirubin 0.4 0.0 - 1.2 MG/DL    ALT 30 8 - 55 U/L    AST 44 (H) 15 - 37 U/L    Alk Phosphatase 159 (H) 40 - 129 U/L    Total Protein 5.6 (L) 6.3 - 8.2 g/dL    Albumin 2.7 (L) 3.2 - 4.6 g/dL    Globulin 2.9 2.3 - 3.5 g/dL    Albumin/Globulin Ratio 0.9 (L) 1.0 - 1.9     Brain Natriuretic Peptide    Collection Time: 01/09/25  4:16 PM   Result Value Ref Range    NT Pro-BNP 33,933 (H) 0 - 450 PG/ML   Troponin    Collection Time: 01/09/25  4:16 PM   Result Value Ref Range    Troponin T 54.0 (H) 0 - 22 ng/L   Sedimentation Rate    Collection Time: 01/09/25  4:16 PM   Result Value Ref Range    Sed Rate, Automated 2 0 - 15 mm/hr   High sensitivity CRP    Collection Time: 01/09/25  4:16 PM   Result Value Ref Range    CRP, High Sensitivity 3.1 (H) 0.0 - 3.0 mg/L   Magnesium    Collection Time: 01/09/25  4:16 PM   Result Value Ref Range    
first.    THERAPY PROGNOSIS: Good    PROBLEM LIST:   (Skilled intervention is medically necessary to address:)  Decreased Activity Tolerance  Decreased Balance  Decreased Gait Ability  Decreased Safety Awareness  Decreased Strength  Decreased Transfer Abilities INTERVENTIONS PLANNED:   (Benefits and precautions of physical therapy have been discussed with the patient.)  Therapeutic Activity  Therapeutic Exercise/HEP  Gait Training  Education       TREATMENT:   EVALUATION: LOW COMPLEXITY: (Untimed Charge)  The initial evaluation charge encompasses clinical chart review, objective assessment, interpretation of assessment, and skilled monitoring of the patient's response to treatment in order to develop a plan of care.     TREATMENT:   NA    TREATMENT GRID:  N/A    AFTER TREATMENT PRECAUTIONS: Bed, Bed/Chair Locked, Call light within reach, RN at bedside, and Side rails x3    INTERDISCIPLINARY COLLABORATION:  RN/ PCT and PT/ PTA    EDUCATION: Education Given To: Patient  Education Provided: Role of Therapy;Plan of Care;Precautions;Transfer Training;Mobility Training;Equipment;Fall Prevention Strategies  Education Method: Demonstration;Verbal  Education Outcome: Verbalized understanding;Demonstrated understanding    TIME IN/OUT:  Time In: 1150  Time Out: 1205  Minutes: 15    THADDEUS BOWIE, PT   
Hearing  Education Outcome: Verbalized understanding;Demonstrated understanding;Continued education needed    TOTAL TREATMENT DURATION AND TIME:  Time In: 1315  Time Out: 1335  Minutes: 20    Mitzy Hu, OT             
   Monocytes % 1.4 (L) 4.0 - 12.0 %    Eosinophils % 0.0 (L) 0.5 - 7.8 %    Basophils % 0.3 0.0 - 2.0 %    Immature Granulocytes % 0.7 0.0 - 5.0 %    Neutrophils Absolute 2.59 1.70 - 8.20 K/UL    Lymphocytes Absolute 0.28 (L) 0.50 - 4.60 K/UL    Monocytes Absolute 0.04 (L) 0.10 - 1.30 K/UL    Eosinophils Absolute 0.00 0.00 - 0.80 K/UL    Basophils Absolute 0.01 0.00 - 0.20 K/UL    Immature Granulocytes Absolute 0.02 0.0 - 0.5 K/UL   POCT Glucose    Collection Time: 01/10/25  9:50 AM   Result Value Ref Range    POC Glucose 386 (H) 65 - 100 mg/dL    Performed by: MEREDITH    POCT Glucose    Collection Time: 01/10/25 11:43 AM   Result Value Ref Range    POC Glucose 374 (H) 65 - 100 mg/dL    Performed by: MEREDITH        No results for input(s): \"COVID19\" in the last 72 hours.    Current Meds:  Current Facility-Administered Medications   Medication Dose Route Frequency    meropenem (MERREM) 1,000 mg in sodium chloride 0.9 % 100 mL IVPB (mini-bag)  1,000 mg IntraVENous Q12H    budesonide (PULMICORT) nebulizer suspension 250 mcg  0.25 mg Nebulization BID RT    arformoterol tartrate (BROVANA) nebulizer solution 15 mcg  15 mcg Nebulization BID RT    ipratropium 0.5 mg-albuterol 2.5 mg (DUONEB) nebulizer solution 1 Dose  1 Dose Inhalation TID RT    polyethylene glycol (GLYCOLAX) packet 17 g  17 g Oral Daily    glucose chewable tablet 16 g  4 tablet Oral PRN    dextrose bolus 10% 125 mL  125 mL IntraVENous PRN    Or    dextrose bolus 10% 250 mL  250 mL IntraVENous PRN    Glucagon Emergency KIT 1 mg  1 mg SubCUTAneous PRN    dextrose 10 % infusion   IntraVENous Continuous PRN    insulin glargine (LANTUS) injection vial 10 Units  10 Units SubCUTAneous Daily    insulin lispro (HUMALOG,ADMELOG) injection vial 0-8 Units  0-8 Units SubCUTAneous 4x Daily AC & HS    atorvastatin (LIPITOR) tablet 40 mg  40 mg Oral Daily    carvedilol (COREG) tablet 25 mg  25 mg Oral BID    tamsulosin (FLOMAX) capsule 0.4 mg  0.4 mg 
Collection Time: 01/13/25  5:58 AM   Result Value Ref Range    POC Glucose 96 65 - 100 mg/dL    Performed by: Laura    POCT Glucose    Collection Time: 01/13/25  7:52 AM   Result Value Ref Range    POC Glucose 78 65 - 100 mg/dL    Performed by: Bernarda    POCT Glucose    Collection Time: 01/13/25 11:12 AM   Result Value Ref Range    POC Glucose 157 (H) 65 - 100 mg/dL    Performed by: Bernarda        No results for input(s): \"COVID19\" in the last 72 hours.    Current Meds:  Current Facility-Administered Medications   Medication Dose Route Frequency    ipratropium 0.5 mg-albuterol 2.5 mg (DUONEB) nebulizer solution 1 Dose  1 Dose Inhalation Q6H PRN    furosemide (LASIX) tablet 40 mg  40 mg Oral Daily    insulin glargine (LANTUS) injection vial 6 Units  6 Units SubCUTAneous Nightly    insulin lispro (HUMALOG,ADMELOG) injection vial 0-8 Units  0-8 Units SubCUTAneous 4x Daily AC & HS    meropenem (MERREM) 1,000 mg in sodium chloride 0.9 % 100 mL IVPB (mini-bag)  1,000 mg IntraVENous Q12H    budesonide (PULMICORT) nebulizer suspension 250 mcg  0.25 mg Nebulization BID RT    arformoterol tartrate (BROVANA) nebulizer solution 15 mcg  15 mcg Nebulization BID RT    polyethylene glycol (GLYCOLAX) packet 17 g  17 g Oral Daily    glucose chewable tablet 16 g  4 tablet Oral PRN    dextrose bolus 10% 125 mL  125 mL IntraVENous PRN    Or    dextrose bolus 10% 250 mL  250 mL IntraVENous PRN    Glucagon Emergency KIT 1 mg  1 mg SubCUTAneous PRN    dextrose 10 % infusion   IntraVENous Continuous PRN    atorvastatin (LIPITOR) tablet 40 mg  40 mg Oral Daily    carvedilol (COREG) tablet 25 mg  25 mg Oral BID    tamsulosin (FLOMAX) capsule 0.4 mg  0.4 mg Oral QHS    aspirin chewable tablet 81 mg  81 mg Oral Daily    HYDROcodone-acetaminophen (NORCO)  MG per tablet 1 tablet  1 tablet Oral Q6H PRN    sacubitril-valsartan (ENTRESTO) 24-26 MG per tablet 1 tablet  1 tablet Oral BID    ferrous sulfate

## 2025-01-13 NOTE — RT PROTOCOL NOTE
developed:  Aerosolized Medication Protocol   Bronchial Hygiene Protocol   Oxygen Protocol  CVRU Fast Track Weaning Protocol   Asthma Treatment Protocol ER  Pediatric Asthma Treatment Protocol ER  Alpha-1 Antitrypsin Deficiency Protocol  Prone Positioning Protocol   COPD Protocol   Home Oxygen Assessment Protocol  Ventilator Weaning Protocol   Lung Volume Expansion Protocol    The Director of Respiratory Care Services oversees the Patient Care Assessment Program. The Respiratory Educator is responsible for protocol development and training. The Supervisor is responsible for implementation and  activities.     Each patient with an order for respiratory treatments will receive an evaluation. Respiratory Care Practitioners (RCP's) will perform the evaluations. The same evaluation tool will be utilized for initial and follow-up assessments.    If the patient does not meet criteria for ordered therapy, the therapy will be discontinued.    If the patient demonstrates an adverse response to initially ordered therapy, the therapy will be discontinued and the physician will be contacted.    Specific physician's orders that deviate from protocols and are deemed \"inappropriate\" or \"unsafe\" will be addressed with ordering physician and/or medical director as required.         Respiratory Patient Care Assessment Protocols    I.  Policy:  In an effort to provide quality patient care and effective utilization of services, physicians who order respiratory therapy will have their patients treated via the protocols established (see attached) Respiratory Care Practitioners (RCP's) will complete the initial assessment which will indicate patient needs,  the care plan developed and will performed within 24 hours of admission. Frequency of the therapy will be set according to the results of the respiratory therapy evaluation and frequency guidelines policy. Reassessment will be continued every 48 hours and more 
Protocols: A. Aerosolized Medication Protocol  Bronchial Hygiene   Oxygen Protocol   [facility] Fast Track Weaning Protocol  Asthma Treatment protocol ER  Alpha-1 antitrypsin Deficiency Protocol  Prone Positioning Protocol  Respiratory Care COPD Protocol  Home Oxygen assessment Protocol  Ventilator Weaning Protocols   Volume Expansion protocol    Indications      Frequency          Level  A. Aerosol therapy   1.  q4h     Severe SOB, wheezing, unable to sleep 1   2.  qid, q4 wa or q6h   Moderate SOB, wheezing   2   3.  tid      Hx of asthma, or COPD mild wheezing,         or facilitate secretion removal              3   4.  bid      Asthma, or COPD, Intermittent wheezing 4   5.  PRN, i.e. tid PRN, qid PRN Asthma, or COPD, occasional wheezing 5       B. Bronchopulmonary Hygiene    1. q4h             Copious secretions, SOB, unable to sleep 1   2. qid & PRN            Moderate amounts of secretions   2   3. tid           Small amounts of secretions and poor cough,               history of secretions    3    4. PRN, i.e. tid PRN, qid PRN     Breathing exercises, encourage cough only 4      C. Oxygen Therapy     Follow hospital approved Oxygen Protocol      Note:  qid treatments are due 0800, 1200, 1600, and 2000.  tid treatments are due 0800, 1400, and 2000  Q6h treatments are due 0800, 1400, 2000, 0200  Q4 wa teatments are due 0800, 1200, 1600, and 2000.  Q4h treatments are due  0800, 1200, 1600, 2000, 0000, and 0400.        The Level 1-5 rating system is only to be used as criteria for determining appropriate frequency of therapy.     References:   N   Joint Commission National Standard   L    Respiratory Care Department Policy, Procedure and Protocol Guideline Manual, 1995, RITU Moise.   L  Therapist Driven Respiratory Care Protocols - A Practitioner's Guide for Criteria-Based Respiratory Care by Jonah Alan M.D., and RITU Moise, RN, RRT.   L  The rationale for therapist-driven protocols: an update. Respiratory Care

## 2025-01-14 ENCOUNTER — TELEPHONE (OUTPATIENT)
Dept: INTERNAL MEDICINE CLINIC | Facility: CLINIC | Age: 80
End: 2025-01-14

## 2025-01-14 VITALS
RESPIRATION RATE: 16 BRPM | HEART RATE: 77 BPM | SYSTOLIC BLOOD PRESSURE: 120 MMHG | BODY MASS INDEX: 18.37 KG/M2 | OXYGEN SATURATION: 96 % | DIASTOLIC BLOOD PRESSURE: 66 MMHG | HEIGHT: 69 IN | WEIGHT: 124 LBS | TEMPERATURE: 97.9 F

## 2025-01-14 LAB
ANION GAP SERPL CALC-SCNC: 8 MMOL/L (ref 7–16)
BASOPHILS # BLD: 0.02 K/UL (ref 0–0.2)
BASOPHILS NFR BLD: 0.4 % (ref 0–2)
BUN SERPL-MCNC: 31 MG/DL (ref 8–23)
CALCIUM SERPL-MCNC: 7.8 MG/DL (ref 8.8–10.2)
CHLORIDE SERPL-SCNC: 108 MMOL/L (ref 98–107)
CO2 SERPL-SCNC: 25 MMOL/L (ref 20–29)
CREAT SERPL-MCNC: 1.46 MG/DL (ref 0.8–1.3)
DIFFERENTIAL METHOD BLD: ABNORMAL
EOSINOPHIL # BLD: 0.18 K/UL (ref 0–0.8)
EOSINOPHIL NFR BLD: 3.5 % (ref 0.5–7.8)
ERYTHROCYTE [DISTWIDTH] IN BLOOD BY AUTOMATED COUNT: 15.3 % (ref 11.9–14.6)
GLUCOSE BLD STRIP.AUTO-MCNC: 228 MG/DL (ref 65–100)
GLUCOSE BLD STRIP.AUTO-MCNC: 74 MG/DL (ref 65–100)
GLUCOSE BLD STRIP.AUTO-MCNC: 89 MG/DL (ref 65–100)
GLUCOSE SERPL-MCNC: 115 MG/DL (ref 70–99)
HCT VFR BLD AUTO: 30.1 % (ref 41.1–50.3)
HGB BLD-MCNC: 9.8 G/DL (ref 13.6–17.2)
IMM GRANULOCYTES # BLD AUTO: 0.03 K/UL (ref 0–0.5)
IMM GRANULOCYTES NFR BLD AUTO: 0.6 % (ref 0–5)
LYMPHOCYTES # BLD: 0.64 K/UL (ref 0.5–4.6)
LYMPHOCYTES NFR BLD: 12.4 % (ref 13–44)
MCH RBC QN AUTO: 32.6 PG (ref 26.1–32.9)
MCHC RBC AUTO-ENTMCNC: 32.6 G/DL (ref 31.4–35)
MCV RBC AUTO: 100 FL (ref 82–102)
MONOCYTES # BLD: 0.44 K/UL (ref 0.1–1.3)
MONOCYTES NFR BLD: 8.5 % (ref 4–12)
NEUTS SEG # BLD: 3.84 K/UL (ref 1.7–8.2)
NEUTS SEG NFR BLD: 74.6 % (ref 43–78)
NRBC # BLD: 0 K/UL (ref 0–0.2)
PLATELET # BLD AUTO: 93 K/UL (ref 150–450)
PMV BLD AUTO: 9.6 FL (ref 9.4–12.3)
POTASSIUM SERPL-SCNC: 4.4 MMOL/L (ref 3.5–5.1)
RBC # BLD AUTO: 3.01 M/UL (ref 4.23–5.6)
SERVICE CMNT-IMP: ABNORMAL
SERVICE CMNT-IMP: NORMAL
SERVICE CMNT-IMP: NORMAL
SODIUM SERPL-SCNC: 141 MMOL/L (ref 136–145)
WBC # BLD AUTO: 5.2 K/UL (ref 4.3–11.1)

## 2025-01-14 PROCEDURE — 6360000002 HC RX W HCPCS: Performed by: FAMILY MEDICINE

## 2025-01-14 PROCEDURE — 2580000003 HC RX 258: Performed by: HOSPITALIST

## 2025-01-14 PROCEDURE — 36415 COLL VENOUS BLD VENIPUNCTURE: CPT

## 2025-01-14 PROCEDURE — 6370000000 HC RX 637 (ALT 250 FOR IP)

## 2025-01-14 PROCEDURE — 82962 GLUCOSE BLOOD TEST: CPT

## 2025-01-14 PROCEDURE — 94760 N-INVAS EAR/PLS OXIMETRY 1: CPT

## 2025-01-14 PROCEDURE — 6370000000 HC RX 637 (ALT 250 FOR IP): Performed by: FAMILY MEDICINE

## 2025-01-14 PROCEDURE — 94640 AIRWAY INHALATION TREATMENT: CPT

## 2025-01-14 PROCEDURE — 80048 BASIC METABOLIC PNL TOTAL CA: CPT

## 2025-01-14 PROCEDURE — 6360000002 HC RX W HCPCS: Performed by: HOSPITALIST

## 2025-01-14 PROCEDURE — 2500000003 HC RX 250 WO HCPCS: Performed by: FAMILY MEDICINE

## 2025-01-14 PROCEDURE — 85025 COMPLETE CBC W/AUTO DIFF WBC: CPT

## 2025-01-14 RX ADMIN — INSULIN LISPRO 2 UNITS: 100 INJECTION, SOLUTION INTRAVENOUS; SUBCUTANEOUS at 11:51

## 2025-01-14 RX ADMIN — CARVEDILOL 25 MG: 25 TABLET, FILM COATED ORAL at 08:15

## 2025-01-14 RX ADMIN — SACUBITRIL AND VALSARTAN 1 TABLET: 24; 26 TABLET, FILM COATED ORAL at 08:14

## 2025-01-14 RX ADMIN — SODIUM CHLORIDE, PRESERVATIVE FREE 10 ML: 5 INJECTION INTRAVENOUS at 08:15

## 2025-01-14 RX ADMIN — CLOPIDOGREL BISULFATE 75 MG: 75 TABLET ORAL at 08:14

## 2025-01-14 RX ADMIN — FUROSEMIDE 40 MG: 40 TABLET ORAL at 08:14

## 2025-01-14 RX ADMIN — FERROUS SULFATE TAB 325 MG (65 MG ELEMENTAL FE) 325 MG: 325 (65 FE) TAB at 08:14

## 2025-01-14 RX ADMIN — HYDROCODONE BITARTRATE AND ACETAMINOPHEN 1 TABLET: 10; 325 TABLET ORAL at 11:53

## 2025-01-14 RX ADMIN — ENOXAPARIN SODIUM 30 MG: 100 INJECTION SUBCUTANEOUS at 08:14

## 2025-01-14 RX ADMIN — BUDESONIDE 250 MCG: 0.25 SUSPENSION RESPIRATORY (INHALATION) at 07:32

## 2025-01-14 RX ADMIN — ATORVASTATIN CALCIUM 40 MG: 40 TABLET, FILM COATED ORAL at 08:14

## 2025-01-14 RX ADMIN — ERTAPENEM SODIUM 1000 MG: 1 INJECTION INTRAMUSCULAR; INTRAVENOUS at 11:01

## 2025-01-14 RX ADMIN — PANTOPRAZOLE SODIUM 40 MG: 40 TABLET, DELAYED RELEASE ORAL at 08:14

## 2025-01-14 RX ADMIN — ARFORMOTEROL TARTRATE 15 MCG: 15 SOLUTION RESPIRATORY (INHALATION) at 07:32

## 2025-01-14 RX ADMIN — ALBUTEROL SULFATE 2.5 MG: 2.5 SOLUTION RESPIRATORY (INHALATION) at 04:18

## 2025-01-14 RX ADMIN — ASPIRIN 81 MG: 81 TABLET, CHEWABLE ORAL at 08:14

## 2025-01-14 RX ADMIN — ALBUTEROL SULFATE 2.5 MG: 2.5 SOLUTION RESPIRATORY (INHALATION) at 11:36

## 2025-01-14 RX ADMIN — EMPAGLIFLOZIN 10 MG: 10 TABLET, FILM COATED ORAL at 08:14

## 2025-01-14 RX ADMIN — POLYETHYLENE GLYCOL 3350 17 G: 17 POWDER, FOR SOLUTION ORAL at 08:14

## 2025-01-14 ASSESSMENT — PAIN DESCRIPTION - DESCRIPTORS: DESCRIPTORS: ACHING

## 2025-01-14 ASSESSMENT — PAIN SCALES - GENERAL
PAINLEVEL_OUTOF10: 5
PAINLEVEL_OUTOF10: 0

## 2025-01-14 ASSESSMENT — PAIN DESCRIPTION - LOCATION: LOCATION: GENERALIZED

## 2025-01-14 NOTE — CARE COORDINATION
Chart reviewed by RNCM and discussed in IDR     CM following for continued stay.     Anticipate DC tomorrow after last dose of IV antibiotics.    PT/OT recommend home health.    [facility]     Please consult case management if any additional discharge needs arise.         
Patient with discharge orders for today.  Patient to DC home with [facility]: SN, PT, OT. No additional needs made known to CM. Patient has met all treatment goals and milestones for discharge. Family to provide transportation home. CM following until patient is discharged.        01/14/25 1047   Services At/After Discharge   Transition of Care Consult (CM Consult) Home Health   Internal Home Health Yes   Services At/After Discharge OT;PT  (SN, PT, OT)   [place] Resource Information Provided? No   Mode of Transport at Discharge Other (see comment)  (Family)   Confirm Follow Up Transport Family   Condition of Participation: Discharge Planning   The Plan for Transition of Care is related to the following treatment goals: Patient to discharge home with home health and return to baseline level of function.   The Patient and/or Patient Representative was provided with a Choice of Provider? Patient   The Patient and/Or Patient Representative agree with the Discharge Plan? Yes   Freedom of Choice list was provided with basic dialogue that supports the patient's individualized plan of care/goals, treatment preferences, and shares the quality data associated with the providers?  Yes       
Patient's niece is unable to transport home today.  Roundtrip arranged for 1400 per request.   
Home Apartment   Bathroom Equipment None   Home Equipment None   Active  Yes   Mode of Transportation Car   Occupation Retired   Discharge Planning   Type of Residence Apartment   Living Arrangements Alone   Current Services Prior To Admission None   Potential Assistance Needed Other (Comment)  (Home Health)         Rashid West RN 01/10/25 12:00 PM

## 2025-01-14 NOTE — TELEPHONE ENCOUNTER
Erica from [facility] called, states they are needing a verbal confirmation if Dr. Shirley is willing to sign for home health orders for this pt.    Erica [phone]

## 2025-01-14 NOTE — DISCHARGE SUMMARY
99 mg/dL    BUN 31 (H) 8 - 23 MG/DL    Creatinine 1.46 (H) 0.80 - 1.30 MG/DL    Est, Glom Filt Rate 48 (L) >60 ml/min/1.73m2    Calcium 7.8 (L) 8.8 - 10.2 MG/DL   POCT Glucose    Collection Time: 01/14/25  7:53 AM   Result Value Ref Range    POC Glucose 89 65 - 100 mg/dL    Performed by: JasperFARRUKH    POCT Glucose    Collection Time: 01/14/25 11:13 AM   Result Value Ref Range    POC Glucose 228 (H) 65 - 100 mg/dL    Performed by: JasperFARRUKH        No results for input(s): \"COVID19\" in the last 72 hours.    Recent Vital Data:  Patient Vitals for the past 24 hrs:   Temp Pulse Resp BP SpO2   01/14/25 1136 -- 77 -- -- 96 %   01/14/25 0753 97.9 °F (36.6 °C) 85 16 120/66 96 %   01/14/25 0733 -- 82 -- -- 96 %   01/14/25 0423 -- -- 16 -- --   01/13/25 2039 -- 86 16 -- 98 %   01/13/25 2035 -- 86 16 -- 98 %   01/13/25 1953 97.5 °F (36.4 °C) 89 16 134/61 98 %       Oxygen Therapy  SpO2: 96 %  Pulse Oximetry Type: Continuous  Pulse via Oximetry: 90 beats per minute  Pulse Oximeter Device Mode: Intermittent  Pulse Oximeter Device Location: Finger  O2 Device: None (Room air)    Estimated body mass index is 18.31 kg/m² as calculated from the following:    Height as of this encounter: 1.753 m (5' 9\").    Weight as of this encounter: 56.2 kg (124 lb).    Intake/Output Summary (Last 24 hours) at 1/14/2025 1216  Last data filed at 1/14/2025 0753  Gross per 24 hour   Intake --   Output 850 ml   Net -850 ml         Physical Exam:    General:   Alert awake elderly male, chronically ill-appearing, no overt distress  Head:  Normocephalic, atraumatic  Eyes:  Sclerae appear normal.  Pupils equally round.    HENT:  Nares appear normal, no drainage.  Moist mucous membranes  Neck:  No restricted ROM.  Trachea midline  CV:   RRR.  No m/r/g.  No JVD  Lungs:   CTAB.  No wheezing, rhonchi, or rales.  Respirations even, unlabored  Abdomen:   Soft, nontender, nondistended.    Extremities: Warm and dry.   No edema.    Skin:     No rashes.

## 2025-01-15 ENCOUNTER — CARE COORDINATION (OUTPATIENT)
Dept: CARE COORDINATION | Facility: CLINIC | Age: 80
End: 2025-01-15

## 2025-01-15 NOTE — TELEPHONE ENCOUNTER
[facility] f/up    Care Transitions Initial Follow Up Call    Outreach made within 2 business days of discharge: Yes    Patient: Beto Shipley Patient : 1945   MRN: [id number]  Reason for Admission: UTI  Discharge Date: 25       Spoke with: DENZEL 252pm 1/15/25    Discharge department/facility: [facility] Interactive Patient Contact:  Was patient able to fill all prescriptions:   Was patient instructed to bring all medications to the follow-up visit:   Is patient aking all medications as directed in the discharge summary?   Does patient understand their discharge instructions:   Does patient have questions or concerns that need addressed prior to 7-14 day follow up office visit:     Additional needs identified to be addressed with provider               Scheduled appointment with PCP within 7-14 days    Follow Up  Future Appointments   Date Time Provider Department Center   2025 11:00 AM MAT LAB [facility] DEP   2025  2:45 PM Henri Dahl MD [facility] AMB   2025 11:40 AM Alma Delia Shirley MD [facility] DEP   2025 10:30 AM [facility] ULTRASOUND 2 [facility] AMB   2025 11:30 AM Lawrence Toledo MD [facility] AMB   2025  1:00 PM Mari Ryan MD [facility] AMB       Sarah Pham

## 2025-01-15 NOTE — CARE COORDINATION
Care Transitions Note    Initial Call - Call within 2 business days of discharge: Yes    Attempted to reach patient for transitions of care follow up. Unable to reach patient.    Outreach Attempts:   HIPAA compliant voicemail left for patient.     Patient: Beto Shipley    Patient : 1945   MRN: [id number]    Reason for Admission: SOB,UTI  Discharge Date: 25  RURS: Readmission Risk Score: 18.1    Last Discharge Facility       Date Complaint Diagnosis Description Type Department Provider    25 Leg Swelling; Shortness of Breath Bilateral impacted cerumen ... ED to Hosp-Admission (Discharged) (ADMITTED) S[facility]3MS Walter Barahona MD; Heavenly Peters.            Was this an external facility discharge? No    Follow Up Appointment:   Patient has hospital follow up appointment scheduled within 7 days of discharge.    Future Appointments         Provider Specialty Dept Phone    2025 11:00 AM MAT LAB Internal Medicine [phone]    2025 2:45 PM Henri Dahl MD Cardiology [phone]    2025 11:40 AM Alma Delia Shirley MD Internal Medicine [phone]    2025 10:30 AM B[facility] ULTRASOUND 2 Vascular Surgery [phone]    2025 11:30 AM Lawrence Toledo MD Vascular Surgery [phone]    2025 1:00 PM Mari Ryan MD Cardiology [phone]            Plan for follow-up on next business day.      Stacy Ramos RN

## 2025-01-16 ENCOUNTER — CARE COORDINATION (OUTPATIENT)
Dept: CARE COORDINATION | Facility: CLINIC | Age: 80
End: 2025-01-16

## 2025-01-16 NOTE — CARE COORDINATION
Care Transitions Note    Initial Call - Call within 2 business days of discharge: Yes    Attempted to reach patient for transitions of care follow up. Unable to reach patient.    Outreach Attempts:   Multiple attempts to contact patient at phone numbers on file.   HIPAA compliant voicemail left for patient.   [employer] message sent.     Patient: Beto Shipley    Patient : 1945   MRN: [id number]    Reason for Admission: SOB and UTI  Discharge Date: 25  RURS: Readmission Risk Score: 18.1    Last Discharge Facility       Date Complaint Diagnosis Description Type Department Provider    25 Leg Swelling; Shortness of Breath Bilateral impacted cerumen ... ED to Hosp-Admission (Discharged) (ADMITTED) [facility] Walter Barahona MD; Heavenly Peters.            Was this an external facility discharge? No    Follow Up Appointment:   Patient has hospital follow up appointment scheduled within 7 days of discharge.  Labs-2025 and PCP-2025  Future Appointments         Provider Specialty Dept Phone    2025 11:00 AM MAT LAB Internal Medicine [phone]    2025 2:45 PM Henri Dahl MD Cardiology [phone]    2025 11:40 AM Alma Delia Shirley MD Internal Medicine [phone]    2025 10:30 AM [facility] ULTRASOUND 2 Vascular Surgery [phone]    2025 11:30 AM Lawrence Toledo MD Vascular Surgery [phone]    2025 1:00 PM Mari Ryan MD Cardiology [phone]            No further follow-up call indicated     Stacy Ramos RN

## 2025-01-16 NOTE — TELEPHONE ENCOUNTER
Care Transitions Initial Follow Up Call    Outreach made within 2 business days of discharge: Yes    Patient: Beto Shipley Patient : 1945   MRN: [id number]  Reason for Admission:   Discharge Date: 25       Spoke with: LVM 536pm    Discharge department/facility:     [facility] Interactive Patient Contact:  Was patient able to fill all prescriptions:   Was patient instructed to bring all medications to the follow-up visit:   Is patient taking all medications as directed in the discharge summary?   Does patient understand their discharge instructions:   Does patient have questions or concerns that need addressed prior to 7-14 day follow up office visit:     Additional needs identified to be addressed with provider               Scheduled appointment with PCP within 7-14 days    Follow Up  Future Appointments   Date Time Provider Department Center   2025 11:00 AM MAT LAB [facility] DEP   2025  2:45 PM Henri Dahl MD [facility] AMB   2025 11:40 AM Alma Delia Shirley MD [facility] DEP   2025 10:30 AM [facility] ULTRASOUND 2 [facility] AMB   2025 11:30 AM Lawrence Toledo MD [facility] AMB   2025  1:00 PM Mari Ryan MD [facility] AMB       Sarah Pham

## 2025-01-17 NOTE — TELEPHONE ENCOUNTER
Care Transitions Initial Follow Up Call    Outreach made within 2 business days of discharge: Yes    Patient: Beto Shipley Patient : 1945   MRN: [id number]  Reason for Admission: UTI  Discharge Date: 25       Spoke with: Patient    Discharge department/facility: [facility] Interactive Patient Contact:  Was patient able to fill all prescriptions: Yes  Was patient instructed to bring all medications to the follow-up visit: Yes  Is patient taking all medications as directed in the discharge summary? Yes  Does patient understand their discharge instructions: Yes  Does patient have questions or concerns that need addressed prior to 7-14 day follow up office visit: no    Additional needs identified to be addressed with provider  No needs identified             Scheduled appointment with PCP within 7-14 days    Follow Up  Future Appointments   Date Time Provider Department Center   2025 11:00 AM MAT LAB [facility] DEP   2025  2:45 PM Henri Dahl MD [facility] AMB   2025 11:40 AM Alma Delia Shirley MD [facility]   2025 10:30 AM [facility] ULTRASOUND 2 [facility] AMB   2025 11:30 AM Lawrence Toledo MD [facility] AMB   2025  1:00 PM Mari Ryan MD [facility] AMB       Bess Snyder LPN

## 2025-01-21 RX ORDER — TAMSULOSIN HYDROCHLORIDE 0.4 MG/1
0.4 CAPSULE ORAL NIGHTLY
Qty: 90 CAPSULE | Refills: 3 | Status: SHIPPED | OUTPATIENT
Start: 2025-01-21

## 2025-01-28 ENCOUNTER — OFFICE VISIT (OUTPATIENT)
Dept: INTERNAL MEDICINE CLINIC | Facility: CLINIC | Age: 80
End: 2025-01-28

## 2025-01-28 VITALS
DIASTOLIC BLOOD PRESSURE: 74 MMHG | TEMPERATURE: 97.3 F | RESPIRATION RATE: 14 BRPM | BODY MASS INDEX: 22.84 KG/M2 | WEIGHT: 121 LBS | HEART RATE: 93 BPM | HEIGHT: 61 IN | OXYGEN SATURATION: 98 % | SYSTOLIC BLOOD PRESSURE: 122 MMHG

## 2025-01-28 DIAGNOSIS — N30.00 ACUTE CYSTITIS WITHOUT HEMATURIA: ICD-10-CM

## 2025-01-28 DIAGNOSIS — D53.8 OTHER SPECIFIED NUTRITIONAL ANEMIAS: ICD-10-CM

## 2025-01-28 DIAGNOSIS — E78.2 MIXED HYPERLIPIDEMIA: ICD-10-CM

## 2025-01-28 DIAGNOSIS — N40.1 BENIGN PROSTATIC HYPERPLASIA WITH URINARY FREQUENCY: ICD-10-CM

## 2025-01-28 DIAGNOSIS — Z87.440 HISTORY OF UTI: ICD-10-CM

## 2025-01-28 DIAGNOSIS — I10 ESSENTIAL HYPERTENSION: ICD-10-CM

## 2025-01-28 DIAGNOSIS — E44.0 MODERATE PROTEIN-CALORIE MALNUTRITION (HCC): ICD-10-CM

## 2025-01-28 DIAGNOSIS — D64.9 ANEMIA, UNSPECIFIED TYPE: ICD-10-CM

## 2025-01-28 DIAGNOSIS — R79.89 ABNORMAL LFTS: Primary | ICD-10-CM

## 2025-01-28 DIAGNOSIS — E11.21 TYPE 2 DIABETES WITH NEPHROPATHY (HCC): ICD-10-CM

## 2025-01-28 DIAGNOSIS — E03.9 ACQUIRED HYPOTHYROIDISM: ICD-10-CM

## 2025-01-28 DIAGNOSIS — I50.23 ACUTE ON CHRONIC SYSTOLIC (CONGESTIVE) HEART FAILURE (HCC): ICD-10-CM

## 2025-01-28 DIAGNOSIS — Z09 HOSPITAL DISCHARGE FOLLOW-UP: Primary | ICD-10-CM

## 2025-01-28 DIAGNOSIS — E78.5 DYSLIPIDEMIA: ICD-10-CM

## 2025-01-28 DIAGNOSIS — E55.9 VITAMIN D INSUFFICIENCY: ICD-10-CM

## 2025-01-28 DIAGNOSIS — R35.0 BENIGN PROSTATIC HYPERPLASIA WITH URINARY FREQUENCY: ICD-10-CM

## 2025-01-28 PROBLEM — E87.5 HYPERKALEMIA: Status: RESOLVED | Noted: 2024-05-13 | Resolved: 2025-01-28

## 2025-01-28 PROBLEM — N39.0 UTI (URINARY TRACT INFECTION): Status: RESOLVED | Noted: 2025-01-09 | Resolved: 2025-01-28

## 2025-01-28 PROBLEM — R10.9 ABDOMINAL PAIN: Status: RESOLVED | Noted: 2023-07-22 | Resolved: 2025-01-28

## 2025-01-28 PROBLEM — E87.0 HYPERNATREMIA: Status: RESOLVED | Noted: 2023-07-24 | Resolved: 2025-01-28

## 2025-01-28 LAB
ALBUMIN SERPL-MCNC: 3.2 G/DL (ref 3.2–4.6)
ALBUMIN/GLOB SERPL: 0.9 (ref 1–1.9)
ALP SERPL-CCNC: 175 U/L (ref 40–129)
ALT SERPL-CCNC: 62 U/L (ref 8–55)
ANION GAP SERPL CALC-SCNC: 8 MMOL/L (ref 7–16)
APPEARANCE UR: ABNORMAL
AST SERPL-CCNC: 60 U/L (ref 15–37)
BACTERIA URNS QL MICRO: ABNORMAL /HPF
BASOPHILS # BLD: 0.02 K/UL (ref 0–0.2)
BASOPHILS NFR BLD: 0.5 % (ref 0–2)
BILIRUB SERPL-MCNC: 0.4 MG/DL (ref 0–1.2)
BILIRUB UR QL: NEGATIVE
BILIRUBIN, URINE, POC: NEGATIVE
BLOOD URINE, POC: NORMAL
BUN SERPL-MCNC: 34 MG/DL (ref 8–23)
CALCIUM SERPL-MCNC: 8.5 MG/DL (ref 8.8–10.2)
CHLORIDE SERPL-SCNC: 110 MMOL/L (ref 98–107)
CHOLEST SERPL-MCNC: 127 MG/DL (ref 0–200)
CO2 SERPL-SCNC: 27 MMOL/L (ref 20–29)
COLOR UR: ABNORMAL
CREAT SERPL-MCNC: 1.47 MG/DL (ref 0.8–1.3)
CREAT UR-MCNC: 50.5 MG/DL (ref 39–259)
DIFFERENTIAL METHOD BLD: ABNORMAL
EOSINOPHIL # BLD: 0.12 K/UL (ref 0–0.8)
EOSINOPHIL NFR BLD: 3.1 % (ref 0.5–7.8)
EPI CELLS #/AREA URNS HPF: ABNORMAL /HPF
ERYTHROCYTE [DISTWIDTH] IN BLOOD BY AUTOMATED COUNT: 15 % (ref 11.9–14.6)
EST. AVERAGE GLUCOSE BLD GHB EST-MCNC: 171 MG/DL
GLOBULIN SER CALC-MCNC: 3.5 G/DL (ref 2.3–3.5)
GLUCOSE SERPL-MCNC: 149 MG/DL (ref 70–99)
GLUCOSE UR STRIP.AUTO-MCNC: >1000 MG/DL
GLUCOSE URINE, POC: 500
HBA1C MFR BLD: 7.6 % (ref 0–5.6)
HCT VFR BLD AUTO: 34.4 % (ref 41.1–50.3)
HDLC SERPL-MCNC: 52 MG/DL (ref 40–60)
HDLC SERPL: 2.4 (ref 0–5)
HGB BLD-MCNC: 10.7 G/DL (ref 13.6–17.2)
HGB UR QL STRIP: ABNORMAL
IMM GRANULOCYTES # BLD AUTO: 0.02 K/UL (ref 0–0.5)
IMM GRANULOCYTES NFR BLD AUTO: 0.5 % (ref 0–5)
KETONES UR QL STRIP.AUTO: NEGATIVE MG/DL
KETONES, URINE, POC: NEGATIVE
LDLC SERPL CALC-MCNC: 57 MG/DL (ref 0–100)
LEUKOCYTE ESTERASE UR QL STRIP.AUTO: ABNORMAL
LEUKOCYTE ESTERASE, URINE, POC: NORMAL
LYMPHOCYTES # BLD: 0.7 K/UL (ref 0.5–4.6)
LYMPHOCYTES NFR BLD: 17.9 % (ref 13–44)
MCH RBC QN AUTO: 32.4 PG (ref 26.1–32.9)
MCHC RBC AUTO-ENTMCNC: 31.1 G/DL (ref 31.4–35)
MCV RBC AUTO: 104.2 FL (ref 82–102)
MICROALBUMIN UR-MCNC: 14.5 MG/DL (ref 0–20)
MICROALBUMIN/CREAT UR-RTO: 287 MG/G (ref 0–30)
MONOCYTES # BLD: 0.4 K/UL (ref 0.1–1.3)
MONOCYTES NFR BLD: 10.3 % (ref 4–12)
NEUTS SEG # BLD: 2.64 K/UL (ref 1.7–8.2)
NEUTS SEG NFR BLD: 67.7 % (ref 43–78)
NITRITE UR QL STRIP.AUTO: POSITIVE
NITRITE, URINE, POC: POSITIVE
NRBC # BLD: 0 K/UL (ref 0–0.2)
OTHER OBSERVATIONS: ABNORMAL
PH UR STRIP: 5 (ref 5–9)
PH, URINE, POC: 5.5 (ref 4.6–8)
PLATELET # BLD AUTO: 118 K/UL (ref 150–450)
PMV BLD AUTO: 11.1 FL (ref 9.4–12.3)
POTASSIUM SERPL-SCNC: 4.5 MMOL/L (ref 3.5–5.1)
PROT SERPL-MCNC: 6.7 G/DL (ref 6.3–8.2)
PROT UR STRIP-MCNC: 30 MG/DL
PROTEIN,URINE, POC: 30
RBC # BLD AUTO: 3.3 M/UL (ref 4.23–5.6)
RBC #/AREA URNS HPF: ABNORMAL /HPF
SODIUM SERPL-SCNC: 145 MMOL/L (ref 136–145)
SP GR UR REFRACTOMETRY: 1.02 (ref 1–1.02)
SPECIFIC GRAVITY, URINE, POC: 1.01 (ref 1–1.03)
TRIGL SERPL-MCNC: 90 MG/DL (ref 0–150)
URINALYSIS CLARITY, POC: NORMAL
URINALYSIS COLOR, POC: YELLOW
UROBILINOGEN UR QL STRIP.AUTO: 0.2 EU/DL (ref 0.2–1)
UROBILINOGEN, POC: NORMAL
VLDLC SERPL CALC-MCNC: 18 MG/DL (ref 6–23)
WBC # BLD AUTO: 3.9 K/UL (ref 4.3–11.1)
WBC URNS QL MICRO: >100 /HPF

## 2025-01-28 RX ORDER — CARVEDILOL 25 MG/1
25 TABLET ORAL 2 TIMES DAILY
Qty: 180 TABLET | Refills: 3 | Status: SHIPPED | OUTPATIENT
Start: 2025-01-28

## 2025-01-28 RX ORDER — POTASSIUM CHLORIDE 1500 MG/1
20 TABLET, EXTENDED RELEASE ORAL DAILY
Qty: 90 TABLET | Refills: 1 | Status: SHIPPED | OUTPATIENT
Start: 2025-01-28

## 2025-01-28 RX ORDER — FERROUS FUMARATE AND POLYSACCHRIDE IRON VITAMIN MINERAL COMPLEX SUPPLEMENT 191.2; 135.9; 1; 210; 20; 5; 5; 7; 25; 3 MG/1; MG/1; MG/1; MG/1; MG/1; MG/1; MG/1; MG/1; MG/1; UG/1
1 CAPSULE ORAL DAILY
Qty: 30 CAPSULE | Refills: 2 | Status: SHIPPED | OUTPATIENT
Start: 2025-01-28

## 2025-01-28 RX ORDER — TORSEMIDE 10 MG/1
10 TABLET ORAL DAILY
Qty: 90 TABLET | Refills: 1 | Status: SHIPPED | OUTPATIENT
Start: 2025-01-28

## 2025-01-28 RX ORDER — ATORVASTATIN CALCIUM 40 MG/1
40 TABLET, FILM COATED ORAL DAILY
Qty: 90 TABLET | Refills: 3 | Status: SHIPPED | OUTPATIENT
Start: 2025-01-28

## 2025-01-28 RX ORDER — FERROUS FUMARATE AND POLYSACCHRIDE IRON VITAMIN MINERAL COMPLEX SUPPLEMENT 191.2; 135.9; 1; 210; 20; 5; 5; 7; 25; 3 MG/1; MG/1; MG/1; MG/1; MG/1; MG/1; MG/1; MG/1; MG/1; UG/1
1 CAPSULE ORAL DAILY
Qty: 8 CAPSULE | Refills: 0 | Status: SHIPPED | COMMUNITY
Start: 2025-01-28

## 2025-01-28 RX ORDER — NITROFURANTOIN 25; 75 MG/1; MG/1
100 CAPSULE ORAL 2 TIMES DAILY
Qty: 20 CAPSULE | Refills: 0 | Status: SHIPPED | OUTPATIENT
Start: 2025-01-28 | End: 2025-02-07

## 2025-01-28 SDOH — ECONOMIC STABILITY: FOOD INSECURITY: WITHIN THE PAST 12 MONTHS, YOU WORRIED THAT YOUR FOOD WOULD RUN OUT BEFORE YOU GOT MONEY TO BUY MORE.: NEVER TRUE

## 2025-01-28 SDOH — ECONOMIC STABILITY: FOOD INSECURITY: WITHIN THE PAST 12 MONTHS, THE FOOD YOU BOUGHT JUST DIDN'T LAST AND YOU DIDN'T HAVE MONEY TO GET MORE.: NEVER TRUE

## 2025-01-28 ASSESSMENT — ENCOUNTER SYMPTOMS
SHORTNESS OF BREATH: 0
BLOOD IN STOOL: 0
RHINORRHEA: 0
ABDOMINAL PAIN: 0
COUGH: 0

## 2025-01-28 ASSESSMENT — PATIENT HEALTH QUESTIONNAIRE - PHQ9
2. FEELING DOWN, DEPRESSED OR HOPELESS: NOT AT ALL
SUM OF ALL RESPONSES TO PHQ9 QUESTIONS 1 & 2: 0
SUM OF ALL RESPONSES TO PHQ QUESTIONS 1-9: 0
1. LITTLE INTEREST OR PLEASURE IN DOING THINGS: NOT AT ALL

## 2025-01-28 NOTE — PROGRESS NOTES
(MYCOSTATIN) [id number] UNIT/GM cream, Apply topically 2 times daily. (Patient not taking: Reported on 1/7/2025), Disp: 30 g, Rfl: 0    Allergies   Allergen Reactions    Iohexol Hives    Other Hives    Iodine Rash       Review of Systems   Constitutional:  Positive for fatigue. Negative for chills and fever.   HENT:  Negative for congestion, postnasal drip and rhinorrhea.    Eyes:  Negative for visual disturbance.   Respiratory:  Negative for cough and shortness of breath.    Cardiovascular:  Positive for leg swelling. Negative for chest pain and palpitations.   Gastrointestinal:  Negative for abdominal pain and blood in stool.   Genitourinary:  Negative for dysuria and frequency.   Musculoskeletal:  Negative for arthralgias and myalgias.   Skin: Negative.    Neurological:  Negative for numbness and headaches.   Psychiatric/Behavioral:  Negative for dysphoric mood and sleep disturbance. The patient is not nervous/anxious.    All other systems reviewed and are negative.      Objective:  /74 (Site: Left Upper Arm, Position: Sitting, Cuff Size: Medium Adult)   Pulse 93   Temp 97.3 °F (36.3 °C) (Temporal)   Resp 14   Ht 1.549 m (5' 1\")   Wt 54.9 kg (121 lb)   SpO2 98%   BMI 22.86 kg/m²     Examination:  Physical Exam  Vitals reviewed.   Constitutional:       Appearance: He is cachectic.   HENT:      Head: Normocephalic and atraumatic.      Nose: Nose normal.      Mouth/Throat:      Mouth: Mucous membranes are moist.      Pharynx: Oropharynx is clear.   Eyes:      Extraocular Movements: Extraocular movements intact.      Conjunctiva/sclera: Conjunctivae normal.      Pupils: Pupils are equal, round, and reactive to light.   Cardiovascular:      Rate and Rhythm: Normal rate and regular rhythm.      Pulses: Normal pulses.      Heart sounds: Normal heart sounds.   Pulmonary:      Effort: Pulmonary effort is normal.      Breath sounds: Normal breath sounds.   Abdominal:      General: Abdomen is flat. Bowel sounds are

## 2025-01-29 ENCOUNTER — TELEPHONE (OUTPATIENT)
Dept: INTERNAL MEDICINE CLINIC | Facility: CLINIC | Age: 80
End: 2025-01-29

## 2025-01-29 NOTE — TELEPHONE ENCOUNTER
Pt called, states he is unable to come in to leave urine sample today. Pt states he will come to office tomorrow 1/29 to leave sample. (UA per Dr. Shirley)

## 2025-01-29 NOTE — TELEPHONE ENCOUNTER
/Spoke with patient, advised to come by office to give urine sample, per patient he will come by today 01/29/2025 to leave urine sample.

## 2025-01-30 DIAGNOSIS — N30.00 ACUTE CYSTITIS WITHOUT HEMATURIA: ICD-10-CM

## 2025-02-03 ENCOUNTER — TELEPHONE (OUTPATIENT)
Dept: INTERNAL MEDICINE CLINIC | Facility: CLINIC | Age: 80
End: 2025-02-03

## 2025-02-03 DIAGNOSIS — R82.71 BACTERIURIA: Primary | ICD-10-CM

## 2025-02-03 LAB
BACTERIA SPEC CULT: ABNORMAL
SERVICE CMNT-IMP: ABNORMAL

## 2025-02-03 RX ORDER — CIPROFLOXACIN 500 MG/1
500 TABLET, FILM COATED ORAL 2 TIMES DAILY
Qty: 14 TABLET | Refills: 0 | Status: SHIPPED | OUTPATIENT
Start: 2025-02-03 | End: 2025-02-10

## 2025-02-03 NOTE — TELEPHONE ENCOUNTER
Received phone call from lab for urine culture growing greater than 100,000 colonies per milliliter of Salmonella.  Regular provider not currently in the office.  Last office note with PCP reviewed with patient having recently been hospitalized for ESBL UTI.  Repeat urinalysis/culture was ordered to document clearance of infection.  Appears as if patient was no longer symptomatic per office note from PCP.  Given this is not a common cause for urinary tract infections I discussed with another physician colleague who recommended repeat urine culture.  Order placed and I have conveyed this information with the patient's regular PCP should they decide on a different course of action.    Orders Placed This Encounter    Culture, Urine     Standing Status:   Future     Standing Expiration Date:   2/3/2026

## 2025-02-03 NOTE — TELEPHONE ENCOUNTER
[facility] called w/critical result for this pt. Was unable to get any further information as I had to put call on hold to help in office pts.

## 2025-02-04 NOTE — TELEPHONE ENCOUNTER
Pt called back at 1044    Per MD Casper oPlanco, different abx rx sent to pharm       Pt voiced understanding

## 2025-02-04 NOTE — TELEPHONE ENCOUNTER
Call pt to get scheduled for a urine culture. Spoke to the pt stated could come in on Thursday 2/6 at 10

## 2025-02-10 LAB
BACTERIA SPEC CULT: ABNORMAL
SERVICE CMNT-IMP: ABNORMAL

## 2025-02-17 RX ORDER — SACUBITRIL AND VALSARTAN 24; 26 MG/1; MG/1
1 TABLET, FILM COATED ORAL 2 TIMES DAILY
Qty: 180 TABLET | Refills: 1 | Status: SHIPPED | OUTPATIENT
Start: 2025-02-17

## 2025-02-17 NOTE — TELEPHONE ENCOUNTER
Pharmacy Called for a Preswcription Refill    MEDICATION REFILL REQUEST          Name of Medication:  Enstresto  Dose:  24MG-26MG  Frequency:  2X DAILY  Quantity:    Days' supply:            Pharmacy Name/Location:  [facility]

## 2025-02-17 NOTE — TELEPHONE ENCOUNTER
Requested Prescriptions     Pending Prescriptions Disp Refills    sacubitril-valsartan (ENTRESTO) 24-26 MG per tablet 180 tablet 1     Sig: Take 1 tablet by mouth 2 times daily     Last ov 11/19/24 Dr. Huang follow up scheduled with Dr. Gutierrez 5/27/25

## 2025-02-18 RX ORDER — FAMOTIDINE 20 MG/1
20 TABLET, FILM COATED ORAL DAILY
Qty: 30 TABLET | Refills: 10 | OUTPATIENT
Start: 2025-02-18

## 2025-02-25 ENCOUNTER — TELEPHONE (OUTPATIENT)
Dept: INTERNAL MEDICINE CLINIC | Facility: CLINIC | Age: 80
End: 2025-02-25

## 2025-02-25 DIAGNOSIS — J44.9 CHRONIC OBSTRUCTIVE PULMONARY DISEASE, UNSPECIFIED COPD TYPE (HCC): ICD-10-CM

## 2025-02-25 DIAGNOSIS — K21.9 GASTROESOPHAGEAL REFLUX DISEASE WITHOUT ESOPHAGITIS: ICD-10-CM

## 2025-02-25 DIAGNOSIS — I10 ESSENTIAL HYPERTENSION: ICD-10-CM

## 2025-02-25 DIAGNOSIS — I50.23 ACUTE ON CHRONIC SYSTOLIC (CONGESTIVE) HEART FAILURE (HCC): ICD-10-CM

## 2025-02-25 DIAGNOSIS — J31.0 RHINITIS, UNSPECIFIED TYPE: ICD-10-CM

## 2025-02-25 RX ORDER — FAMOTIDINE 20 MG/1
20 TABLET, FILM COATED ORAL 2 TIMES DAILY
COMMUNITY

## 2025-02-25 NOTE — TELEPHONE ENCOUNTER
I called the pt at 1322 to ask the pt if he wanted me to send his medication to [facility] pharmacy. Pt was in agreement that we send his medication here. He stated that [facility] said that his insurance wants him to go here now.        I couldn't find famotidine in pts current med list however it is in pts med list.       Please pend and sign that as I could not

## 2025-02-25 NOTE — TELEPHONE ENCOUNTER
I called pt at 1003 left a vm for pt to call back when he could.    I called pt at 1312 couldn't leave a vm due to the mail box being full.

## 2025-02-26 RX ORDER — TORSEMIDE 10 MG/1
10 TABLET ORAL DAILY
Qty: 90 TABLET | Refills: 0 | Status: SHIPPED | OUTPATIENT
Start: 2025-02-26

## 2025-02-26 RX ORDER — TAMSULOSIN HYDROCHLORIDE 0.4 MG/1
0.4 CAPSULE ORAL NIGHTLY
Qty: 90 CAPSULE | Refills: 0 | Status: SHIPPED | OUTPATIENT
Start: 2025-02-26

## 2025-02-26 RX ORDER — FLUTICASONE PROPIONATE 50 MCG
2 SPRAY, SUSPENSION (ML) NASAL DAILY
Qty: 48 G | Refills: 0 | Status: SHIPPED | OUTPATIENT
Start: 2025-02-26

## 2025-02-26 RX ORDER — CARVEDILOL 25 MG/1
25 TABLET ORAL 2 TIMES DAILY
Qty: 180 TABLET | Refills: 0 | Status: SHIPPED | OUTPATIENT
Start: 2025-02-26

## 2025-02-26 RX ORDER — PANTOPRAZOLE SODIUM 40 MG/1
40 TABLET, DELAYED RELEASE ORAL
Qty: 90 TABLET | Refills: 0 | Status: SHIPPED | OUTPATIENT
Start: 2025-02-26

## 2025-02-26 RX ORDER — POTASSIUM CHLORIDE 1500 MG/1
20 TABLET, EXTENDED RELEASE ORAL DAILY
Qty: 90 TABLET | Refills: 0 | Status: SHIPPED | OUTPATIENT
Start: 2025-02-26

## 2025-02-26 RX ORDER — FLUTICASONE FUROATE, UMECLIDINIUM BROMIDE AND VILANTEROL TRIFENATATE 100; 62.5; 25 UG/1; UG/1; UG/1
1 POWDER RESPIRATORY (INHALATION) DAILY
Qty: 3 EACH | Refills: 0 | Status: SHIPPED | OUTPATIENT
Start: 2025-02-26

## 2025-02-26 RX ORDER — ALBUTEROL SULFATE 90 UG/1
2 INHALANT RESPIRATORY (INHALATION) 4 TIMES DAILY PRN
Qty: 54 G | Refills: 0 | Status: SHIPPED | OUTPATIENT
Start: 2025-02-26

## 2025-03-13 ENCOUNTER — TELEPHONE (OUTPATIENT)
Dept: INTERNAL MEDICINE CLINIC | Facility: CLINIC | Age: 80
End: 2025-03-13

## 2025-03-13 NOTE — TELEPHONE ENCOUNTER
Pt cancelled appointment due to a virus, [facility] did not let me know and I did not get to cancel the roundtrip in time. I did cancel the outbound ride from the office    133/68 last time he took it was yesterday and he has not taken his BP  today and he is not dizzy today or at this moment. This was yesterdays BP    I advised the patient to please make sure that if he feels that way again he needs to make sure that he seeks medical help. Pt verbalized understanding. Thank you

## 2025-03-13 NOTE — TELEPHONE ENCOUNTER
Patient calling this morning concerned over his BP.  Stated he felt dizzy this morning and took it by machine--133/68.  He had difficulty hearing me over the phone.  He requested a nurse callback.  Thanks.

## 2025-04-14 DIAGNOSIS — I50.23 ACUTE ON CHRONIC SYSTOLIC (CONGESTIVE) HEART FAILURE (HCC): ICD-10-CM

## 2025-04-14 DIAGNOSIS — D64.9 ANEMIA, UNSPECIFIED TYPE: ICD-10-CM

## 2025-04-14 RX ORDER — TORSEMIDE 10 MG/1
10 TABLET ORAL DAILY
Qty: 30 TABLET | Refills: 43 | OUTPATIENT
Start: 2025-04-14

## 2025-04-14 RX ORDER — ASCORBIC ACID 500 MG
500 TABLET ORAL DAILY
Qty: 30 TABLET | Refills: 10 | OUTPATIENT
Start: 2025-04-14

## 2025-04-15 ENCOUNTER — TELEPHONE (OUTPATIENT)
Dept: INTERNAL MEDICINE CLINIC | Facility: CLINIC | Age: 80
End: 2025-04-15

## 2025-04-15 NOTE — TELEPHONE ENCOUNTER
Pt sister called concerned about wound on pt leg. Aneudy said she was worried about pt because she had not heard from pt in 24 hours and tried calling pt phone several times with no response. Recommended a wellness check for pt and provided [facility] number to Aneudy.

## 2025-05-05 ENCOUNTER — TELEPHONE (OUTPATIENT)
Dept: INTERNAL MEDICINE CLINIC | Facility: CLINIC | Age: 80
End: 2025-05-05

## 2025-06-03 DIAGNOSIS — I50.23 ACUTE ON CHRONIC SYSTOLIC (CONGESTIVE) HEART FAILURE (HCC): ICD-10-CM

## 2025-06-03 DIAGNOSIS — D64.9 ANEMIA, UNSPECIFIED TYPE: ICD-10-CM

## 2025-06-03 DIAGNOSIS — K21.9 GASTROESOPHAGEAL REFLUX DISEASE WITHOUT ESOPHAGITIS: ICD-10-CM

## 2025-06-04 RX ORDER — TORSEMIDE 10 MG/1
10 TABLET ORAL DAILY
Qty: 30 TABLET | Refills: 10 | Status: CANCELLED | OUTPATIENT
Start: 2025-06-04

## 2025-06-04 RX ORDER — POTASSIUM CHLORIDE 1500 MG/1
20 TABLET, EXTENDED RELEASE ORAL DAILY
Qty: 30 TABLET | Refills: 10 | Status: CANCELLED | OUTPATIENT
Start: 2025-06-04

## 2025-06-04 RX ORDER — ASCORBIC ACID 500 MG
500 TABLET ORAL DAILY
Qty: 30 TABLET | Refills: 10 | Status: CANCELLED | OUTPATIENT
Start: 2025-06-04

## 2025-06-04 RX ORDER — PANTOPRAZOLE SODIUM 40 MG/1
40 TABLET, DELAYED RELEASE ORAL
Qty: 30 TABLET | Refills: 10 | Status: CANCELLED | OUTPATIENT
Start: 2025-06-04

## 2025-06-24 ENCOUNTER — TELEPHONE (OUTPATIENT)
Dept: INTERNAL MEDICINE CLINIC | Facility: CLINIC | Age: 80
End: 2025-06-24

## (undated) DEVICE — CONNECTOR TBNG OD5-7MM O2 END DISP

## (undated) DEVICE — KENDALL RADIOLUCENT FOAM MONITORING ELECTRODE RECTANGULAR SHAPE: Brand: KENDALL

## (undated) DEVICE — BLOCK BITE AD 60FR W/ VELC STRP ADDRESSES MOST PT AND

## (undated) DEVICE — CANNULA NSL ORAL AD FOR CAPNOFLEX CO2 O2 AIRLFE